# Patient Record
Sex: MALE | Race: AMERICAN INDIAN OR ALASKA NATIVE
[De-identification: names, ages, dates, MRNs, and addresses within clinical notes are randomized per-mention and may not be internally consistent; named-entity substitution may affect disease eponyms.]

---

## 2018-10-04 ENCOUNTER — HOSPITAL ENCOUNTER (EMERGENCY)
Dept: HOSPITAL 5 - ED | Age: 48
LOS: 1 days | Discharge: HOME | End: 2018-10-05
Payer: MEDICAID

## 2018-10-04 VITALS — DIASTOLIC BLOOD PRESSURE: 48 MMHG | SYSTOLIC BLOOD PRESSURE: 127 MMHG

## 2018-10-04 DIAGNOSIS — K04.7: Primary | ICD-10-CM

## 2018-10-04 DIAGNOSIS — J44.9: ICD-10-CM

## 2018-10-04 DIAGNOSIS — F17.200: ICD-10-CM

## 2018-10-04 PROCEDURE — 99282 EMERGENCY DEPT VISIT SF MDM: CPT

## 2018-10-05 NOTE — EMERGENCY DEPARTMENT REPORT
HPI





- General


Chief Complaint: Dental/Oral


Time Seen by Provider: 10/05/18 00:15





- HPI


HPI: 





Plan of dental pain, for 1 month, lower tooth, front, minimal redness and 

swelling around tooth.  Gum swelling.  No fever, chills or night sweat.





ED Past Medical Hx





- Past Medical History


Previous Medical History?: Yes


Hx Asthma: Yes


Hx COPD: Yes





- Surgical History


Past Surgical History?: Yes


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 

surgery





- Social History


Smoking Status: Current Every Day Smoker


Substance Use Type: None





- Medications


Home Medications: 


 Home Medications











 Medication  Instructions  Recorded  Confirmed  Last Taken  Type


 


Acetaminophen/Codeine [Tylenol 1 tab PO Q6H PRN #12 tab 09/12/17  Unknown Rx





/Codeine # 3 tab]     


 


Hydrocortisone 1% [Hydrocortisone 1 applicatio TP TID PRN #1 tube 09/12/17  

Unknown Rx





1% CREAM]     


 


Skin Emollient [Aquaphor (Nf)] 1 applic TP BID #1 jar 09/12/17  Unknown Rx


 


Clindamycin [Clindamycin CAP] 300 mg PO Q8H 7 Days #21 cap 06/26/18  Unknown Rx


 


Naproxen [Naprosyn TAB] 500 mg PO BID PRN #15 tablet 06/26/18  Unknown Rx


 


traMADol [Ultram 50 MG tab] 50 mg PO Q6HR PRN #15 tablet 06/26/18  Unknown Rx


 


Clindamycin HCl 150 mg PO TID 7 Days #21 capsule 10/05/18  Unknown Rx


 


Naproxen [Naprosyn TAB] 500 mg PO BID PRN #20 tablet 10/05/18  Unknown Rx














ED Review of Systems


ROS: 


Stated complaint: TOOTHACHE


Other details as noted in HPI





Comment: All other systems reviewed and negative


ENT: dental pain


Respiratory: denies: cough, orthopnea


Cardiovascular: denies: chest pain, palpitations


Endocrine: denies: excessive sweating, flushing, intolerance to cold





Physical Exam





- Physical Exam


Vital Signs: 


 Vital Signs











  10/04/18





  23:28


 


Temperature 98.7 F


 


Pulse Rate 74


 


Respiratory 16





Rate 


 


Blood Pressure 127/48


 


O2 Sat by Pulse 98





Oximetry 











Physical Exam: 





- Physical Exam


Physical Exam: 





- General


Limitations: No Limitations


General appearance: alert, in no apparent distress.





- Head


Head exam: Present: atraumatic, normocephalic





- Eye


Eye exam: Present: normal appearance





- ENT


ENT exam: Present: mucous membranes moist, anterior,lower jaw tooth with gum 

swelling, small abscess





- Neck


Neck exam: Present: normal inspection





- Respiratory


Respiratory exam: Present: normal lung sounds bilaterally.  Absent: respiratory 

distress





- Cardiovascular


Cardiovascular Exam: Present: normal rhythm.  Absent: systolic murmur, 

diastolic murmur, rubs, gallop





- GI/Abdominal


GI/Abdominal exam: Present: soft, normal bowel sounds





- Extremities Exam


Extremities exam: Present: normal inspection





- Back Exam


Back exam: Present: normal inspection





- Neurological Exam


Neurological exam: Present: alert, oriented X3





- Psychiatric


Psychiatric exam: normal affect and mood





- Skin


Skin exam: Present: warm, dry, intact, normal color.  Absent: rash





ED Course


 Vital Signs











  10/04/18





  23:28


 


Temperature 98.7 F


 


Pulse Rate 74


 


Respiratory 16





Rate 


 


Blood Pressure 127/48


 


O2 Sat by Pulse 98





Oximetry 











Critical care attestation.: 


If time is entered above; I have spent that time in minutes in the direct care 

of this critically ill patient, excluding procedure time.








ED Disposition


Clinical Impression: 


 Dental abscess





Disposition: DC-01 TO HOME OR SELFCARE


Is pt being admited?: No


Does the pt Need Aspirin: No


Condition: Stable


Prescriptions: 


Clindamycin HCl 150 mg PO TID 7 Days #21 capsule


Naproxen [Naprosyn TAB] 500 mg PO BID PRN #20 tablet


 PRN Reason: Pain

## 2019-11-24 ENCOUNTER — HOSPITAL ENCOUNTER (INPATIENT)
Dept: HOSPITAL 5 - ED | Age: 49
LOS: 10 days | Discharge: HOME | DRG: 917 | End: 2019-12-04
Attending: HOSPITALIST | Admitting: INTERNAL MEDICINE
Payer: MEDICAID

## 2019-11-24 DIAGNOSIS — R74.0: ICD-10-CM

## 2019-11-24 DIAGNOSIS — J43.8: ICD-10-CM

## 2019-11-24 DIAGNOSIS — Z90.81: ICD-10-CM
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
## 2019-11-24 DIAGNOSIS — E43: ICD-10-CM

## 2019-11-24 DIAGNOSIS — I95.9: ICD-10-CM

## 2019-11-24 DIAGNOSIS — J96.02: ICD-10-CM

## 2019-11-24 DIAGNOSIS — T40.5X2A: Primary | ICD-10-CM

## 2019-11-24 DIAGNOSIS — D72.829: ICD-10-CM

## 2019-11-24 DIAGNOSIS — F17.213: ICD-10-CM
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
## 2019-11-24 DIAGNOSIS — F14.20: ICD-10-CM

## 2019-11-24 DIAGNOSIS — R45.851: ICD-10-CM

## 2019-11-24 DIAGNOSIS — F10.20: ICD-10-CM
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
## 2019-11-24 DIAGNOSIS — E83.39: ICD-10-CM

## 2019-11-24 DIAGNOSIS — F19.10: ICD-10-CM

## 2019-11-24 DIAGNOSIS — S14.109A: ICD-10-CM

## 2019-11-24 DIAGNOSIS — J96.01: ICD-10-CM

## 2019-11-24 DIAGNOSIS — F32.9: ICD-10-CM

## 2019-11-24 DIAGNOSIS — G93.41: ICD-10-CM
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
## 2019-11-24 LAB
ALBUMIN SERPL-MCNC: 3.1 G/DL (ref 3.9–5)
ALBUMIN SERPL-MCNC: 3.2 G/DL (ref 3.9–5)
ALT SERPL-CCNC: 47 UNITS/L (ref 7–56)
APTT BLD: 29.7 SEC. (ref 24.2–36.6)
BASOPHILS # (AUTO): 0.2 K/MM3 (ref 0–0.1)
BASOPHILS NFR BLD AUTO: 1.9 % (ref 0–1.8)
BENZODIAZEPINES SCREEN,URINE: (no result)
BILIRUB DIRECT SERPL-MCNC: < 0.2 MG/DL (ref 0–0.2)
BILIRUB UR QL STRIP: (no result)
BLOOD UR QL VISUAL: (no result)
BUN SERPL-MCNC: 16 MG/DL (ref 9–20)
BUN/CREAT SERPL: 18 %
CALCIUM SERPL-MCNC: 8.8 MG/DL (ref 8.4–10.2)
CAOX CRY #/AREA URNS HPF: (no result) /[HPF]
EOSINOPHIL # BLD AUTO: 0.1 K/MM3 (ref 0–0.4)
EOSINOPHIL NFR BLD AUTO: 0.8 % (ref 0–4.3)
HCT VFR BLD CALC: 38.5 % (ref 35.5–45.6)
HEMOLYSIS INDEX: 2
HGB BLD-MCNC: 13.1 GM/DL (ref 11.8–15.2)
INR PPP: 3 (ref 0.87–1.13)
LYMPHOCYTES # BLD AUTO: 2.8 K/MM3 (ref 1.2–5.4)
LYMPHOCYTES NFR BLD AUTO: 28 % (ref 13.4–35)
MCHC RBC AUTO-ENTMCNC: 34 % (ref 32–34)
MCV RBC AUTO: 99 FL (ref 84–94)
METHADONE SCREEN,URINE: (no result)
MONOCYTES # (AUTO): 1.2 K/MM3 (ref 0–0.8)
MONOCYTES % (AUTO): 12.1 % (ref 0–7.3)
MUCOUS THREADS #/AREA URNS HPF: (no result) /HPF
OPIATE SCREEN,URINE: (no result)
PH UR STRIP: 6 [PH] (ref 5–7)
PLATELET # BLD: 353 K/MM3 (ref 140–440)
PROT UR STRIP-MCNC: (no result) MG/DL
RBC # BLD AUTO: 3.89 M/MM3 (ref 3.65–5.03)
RBC #/AREA URNS HPF: 25 /HPF (ref 0–6)
UROBILINOGEN UR-MCNC: 4 MG/DL (ref ?–2)
WBC #/AREA URNS HPF: 3 /HPF (ref 0–6)

## 2019-11-24 PROCEDURE — 86901 BLOOD TYPING SEROLOGIC RH(D): CPT

## 2019-11-24 PROCEDURE — 82550 ASSAY OF CK (CPK): CPT

## 2019-11-24 PROCEDURE — 83615 LACTATE (LD) (LDH) ENZYME: CPT

## 2019-11-24 PROCEDURE — 85018 HEMOGLOBIN: CPT

## 2019-11-24 PROCEDURE — 85049 AUTOMATED PLATELET COUNT: CPT

## 2019-11-24 PROCEDURE — 85610 PROTHROMBIN TIME: CPT

## 2019-11-24 PROCEDURE — 84484 ASSAY OF TROPONIN QUANT: CPT

## 2019-11-24 PROCEDURE — 83036 HEMOGLOBIN GLYCOSYLATED A1C: CPT

## 2019-11-24 PROCEDURE — 93970 EXTREMITY STUDY: CPT

## 2019-11-24 PROCEDURE — 85027 COMPLETE CBC AUTOMATED: CPT

## 2019-11-24 PROCEDURE — 99292 CRITICAL CARE ADDL 30 MIN: CPT

## 2019-11-24 PROCEDURE — 82150 ASSAY OF AMYLASE: CPT

## 2019-11-24 PROCEDURE — 74018 RADEX ABDOMEN 1 VIEW: CPT

## 2019-11-24 PROCEDURE — 80307 DRUG TEST PRSMV CHEM ANLYZR: CPT

## 2019-11-24 PROCEDURE — 85025 COMPLETE CBC W/AUTO DIFF WBC: CPT

## 2019-11-24 PROCEDURE — 70450 CT HEAD/BRAIN W/O DYE: CPT

## 2019-11-24 PROCEDURE — 0BH18EZ INSERTION OF ENDOTRACHEAL AIRWAY INTO TRACHEA, VIA NATURAL OR ARTIFICIAL OPENING ENDOSCOPIC: ICD-10-PCS | Performed by: INTERNAL MEDICINE

## 2019-11-24 PROCEDURE — 83690 ASSAY OF LIPASE: CPT

## 2019-11-24 PROCEDURE — 74176 CT ABD & PELVIS W/O CONTRAST: CPT

## 2019-11-24 PROCEDURE — 80053 COMPREHEN METABOLIC PANEL: CPT

## 2019-11-24 PROCEDURE — 71045 X-RAY EXAM CHEST 1 VIEW: CPT

## 2019-11-24 PROCEDURE — 36415 COLL VENOUS BLD VENIPUNCTURE: CPT

## 2019-11-24 PROCEDURE — 94760 N-INVAS EAR/PLS OXIMETRY 1: CPT

## 2019-11-24 PROCEDURE — 80048 BASIC METABOLIC PNL TOTAL CA: CPT

## 2019-11-24 PROCEDURE — 86900 BLOOD TYPING SEROLOGIC ABO: CPT

## 2019-11-24 PROCEDURE — 80076 HEPATIC FUNCTION PANEL: CPT

## 2019-11-24 PROCEDURE — 94002 VENT MGMT INPAT INIT DAY: CPT

## 2019-11-24 PROCEDURE — 82040 ASSAY OF SERUM ALBUMIN: CPT

## 2019-11-24 PROCEDURE — 83735 ASSAY OF MAGNESIUM: CPT

## 2019-11-24 PROCEDURE — 94003 VENT MGMT INPAT SUBQ DAY: CPT

## 2019-11-24 PROCEDURE — 93005 ELECTROCARDIOGRAM TRACING: CPT

## 2019-11-24 PROCEDURE — 85730 THROMBOPLASTIN TIME PARTIAL: CPT

## 2019-11-24 PROCEDURE — 93306 TTE W/DOPPLER COMPLETE: CPT

## 2019-11-24 PROCEDURE — 85014 HEMATOCRIT: CPT

## 2019-11-24 PROCEDURE — 81001 URINALYSIS AUTO W/SCOPE: CPT

## 2019-11-24 PROCEDURE — 93010 ELECTROCARDIOGRAM REPORT: CPT

## 2019-11-24 PROCEDURE — 83880 ASSAY OF NATRIURETIC PEPTIDE: CPT

## 2019-11-24 PROCEDURE — 36600 WITHDRAWAL OF ARTERIAL BLOOD: CPT

## 2019-11-24 PROCEDURE — 86850 RBC ANTIBODY SCREEN: CPT

## 2019-11-24 PROCEDURE — 82140 ASSAY OF AMMONIA: CPT

## 2019-11-24 PROCEDURE — 87070 CULTURE OTHR SPECIMN AEROBIC: CPT

## 2019-11-24 PROCEDURE — 82962 GLUCOSE BLOOD TEST: CPT

## 2019-11-24 PROCEDURE — 82553 CREATINE MB FRACTION: CPT

## 2019-11-24 PROCEDURE — 72125 CT NECK SPINE W/O DYE: CPT

## 2019-11-24 PROCEDURE — 80320 DRUG SCREEN QUANTALCOHOLS: CPT

## 2019-11-24 PROCEDURE — G0480 DRUG TEST DEF 1-7 CLASSES: HCPCS

## 2019-11-24 PROCEDURE — 94640 AIRWAY INHALATION TREATMENT: CPT

## 2019-11-24 PROCEDURE — 84100 ASSAY OF PHOSPHORUS: CPT

## 2019-11-24 PROCEDURE — 71275 CT ANGIOGRAPHY CHEST: CPT

## 2019-11-24 PROCEDURE — 4A033R1 MEASUREMENT OF ARTERIAL SATURATION, PERIPHERAL, PERCUTANEOUS APPROACH: ICD-10-PCS | Performed by: INTERNAL MEDICINE

## 2019-11-24 PROCEDURE — 5A1945Z RESPIRATORY VENTILATION, 24-96 CONSECUTIVE HOURS: ICD-10-PCS | Performed by: INTERNAL MEDICINE

## 2019-11-24 PROCEDURE — 82803 BLOOD GASES ANY COMBINATION: CPT

## 2019-11-24 PROCEDURE — 85007 BL SMEAR W/DIFF WBC COUNT: CPT

## 2019-11-24 PROCEDURE — 87205 SMEAR GRAM STAIN: CPT

## 2019-11-24 PROCEDURE — 85520 HEPARIN ASSAY: CPT

## 2019-11-24 RX ADMIN — PROPOFOL SCH MLS/HR: 10 INJECTION, EMULSION INTRAVENOUS at 22:50

## 2019-11-24 RX ADMIN — IPRATROPIUM BROMIDE AND ALBUTEROL SULFATE SCH AMPUL: .5; 3 SOLUTION RESPIRATORY (INHALATION) at 19:51

## 2019-11-24 RX ADMIN — FAMOTIDINE SCH MG: 10 INJECTION, SOLUTION INTRAVENOUS at 21:04

## 2019-11-24 RX ADMIN — Medication SCH ML: at 21:05

## 2019-11-24 RX ADMIN — METHYLPREDNISOLONE SODIUM SUCCINATE SCH MG: 125 INJECTION, POWDER, FOR SOLUTION INTRAMUSCULAR; INTRAVENOUS at 21:04

## 2019-11-24 RX ADMIN — PROPOFOL SCH MLS/HR: 10 INJECTION, EMULSION INTRAVENOUS at 10:49

## 2019-11-24 RX ADMIN — DEXTROSE AND SODIUM CHLORIDE SCH MLS/HR: 5; .9 INJECTION, SOLUTION INTRAVENOUS at 20:22

## 2019-11-24 NOTE — CAT SCAN REPORT
CT ABDOMEN AND PELVIS WITHOUT CONTRAST



INDICATION: Abdominal pain after fall.



COMPARISON: CT abdomen and pelvis from 8/31/2018.



TECHNIQUE: Axial, coronal and sagittal CT imaging of the abdomen and pelvis was performed  without co
ntrast.  Lack of intravenous contrast limits evaluation of the vascular and solid organs.  All CT sca
ns at this location are performed using CT dose reduction for ALARA by means of automated exposure co
ntrol.

 

FINDINGS: 

LOWER CHEST: Bilateral bullous disease is partially visualized. There is dependent bilateral atelecta
sis. No additional significant abnormality is seen.

LIVER: No significant abnormality.

BILIARY: No significant abnormality.

PANCREAS: No significant abnormality.

SPLEEN: The spleen is not clearly seen.

ADRENALS: No significant abnormality.

KIDNEYS AND URETERS: There is a probable right upper renal pole cyst measuring 1.5 cm. No other signi
ficant abnormality is seen.



GI TRACT: No significant abnormality of the stomach, small bowel or colon.  The appendix is not seen.


PERITONEUM: No free fluid. No free air. No fluid collection.

LYMPH NODES: No significant adenopathy.

VASCULATURE: No significant abnormality. 



URINARY BLADDER: Drained by a Aguirre catheter.

REPRODUCTIVE ORGANS: No significant abnormality.



ADDITIONAL FINDINGS: None.



SKELETAL SYSTEM: No significant abnormality.



IMPRESSION: 

1. Limited noncontrast exam demonstrating no acute abnormality of the abdomen or pelvis.

2. Additional findings as above.



Signer Name: Naseem Chahal MD 

Signed: 11/24/2019 11:05 AM

 Workstation Name: [id number]

## 2019-11-24 NOTE — EVENT NOTE
Date: 11/24/19


See history and physical in the reports


Acute respiratory failure


Drug overdose----Cocaine use


Suicidal attempt

## 2019-11-24 NOTE — CAT SCAN REPORT
CT head/brain wo con



INDICATION:

Trauma.



TECHNIQUE: Routine CT head without contrast. All CT scans at this location are performed using CT dos
e reduction for ALARA by means of automated exposure control.



COMPARISON: 

CT head without contrast from 1/2/2019.



FINDINGS:



PARENCHYMA:  No mass, midline shift, hemorrhage, extraaxial collection or acute territorial infarctio
n. Mobile chronic microvessel or skin changes are noted along the periventricular white matter.

VENTRICLES:  Symmetric and normal in size.  

SOFT TISSUES:  Soft tissues including the orbits appear normal.   

BONES:  No acute osseous abnormality.   

SINUSES: There is generalized sinus disease, most significant along the maxillary sinuses, with mucos
al thickening and air-fluid levels. The mastoid air cells are clear.

ADDITIONAL FINDINGS: None. 



IMPRESSION:

1. No acute intracranial abnormality.

2. Additional findings as above. 



Signer Name: Naseem Chahal MD 

Signed: 11/24/2019 11:09 AM

 Workstation Name: [id number]

## 2019-11-24 NOTE — XRAY REPORT
CHEST 1 VIEW 11/24/2019 10:55 AM



INDICATION / CLINICAL INFORMATION:

ETT placement.



COMPARISON: 

One view of the chest from 1/3/2019.



FINDINGS:



SUPPORT DEVICES: An ET tube terminates 10 cm above the andreina.

HEART / MEDIASTINUM: No significant abnormality. 

LUNGS / PLEURA: Bullous disease is noted along the upper lobes with generalized bilateral chronic sarah
earing parenchymal changes. No acute pulmonary abnormality is seen. No significant pleural effusion o
r pneumothorax is identified. 



ADDITIONAL FINDINGS: No significant additional findings.



IMPRESSION:

1. No acute abnormality of the chest.

2. Additional findings as above.



Signer Name: Naseem Chahal MD 

Signed: 11/24/2019 11:20 AM

 Workstation Name: [id number]

## 2019-11-24 NOTE — XRAY REPORT
BILATERAL TIBIA/FIBULA 4 VIEWS



INDICATION / CLINICAL INFORMATION:

Bilateral leg pain after falling off a bridge.



COMPARISON:

None available.

 

FINDINGS:



BONES and JOINT(S): No acute fracture or subluxation. No significant arthritis.

SOFT TISSUES: No significant abnormality.



ADDITIONAL FINDINGS: None.



IMPRESSION:

No acute abnormality of either tibia/fibula.



Signer Name: Naseem Chahal MD 

Signed: 11/24/2019 11:22 AM

 Workstation Name: [id number]

## 2019-11-24 NOTE — CAT SCAN REPORT
CT CERVICAL SPINE WITHOUT CONTRAST



INDICATION / CLINICAL INFORMATION:

Trauma. Patient fell sustaining neck injury.



TECHNIQUE:

Axial CT images were obtained through the cervical spine. Sagittal and coronal reformatted images wer
e produced. All CT scans at this location are performed using CT dose reduction for ALARA by means of
 automated exposure control. 



COMPARISON:

None available.



FINDINGS:



ALIGNMENT: No significant abnormality. No evidence of traumatic subluxation.



VERTEBRAE: No indication of fracture.



DISC SPACES: Disc height is maintained throughout



INDIVIDUAL LEVEL ANALYSIS:



C2-3:No abnormality.



C3-4:No abnormality.



C4-5:No abnormality.



C5-6:No abnormality.



C6-7:No abnormality.



C7-T1:No abnormality.





CRANIOCERVICAL JUNCTION:No significant abnormality.



SPINAL CANAL: Central spinal canal and neuroforamina are adequately maintained throughout the cervica
l region.



PARASPINAL SOFT TISSUES: Increased soft tissue fullness is seen in the posterior nasopharynx, posteri
or oral cavity and posterior nasal cavity. Evaluation of these regions is somewhat limited on this no
ncontrast scan. This appears to be due to retained secretions secondary to intubation, however, follo
w up CT neck is suggested to exclude other retropharyngeal pathology such as mass or hematoma.



ADDITIONAL FINDINGS: There is a foreign body along the lateral aspect of the right neck. This may be 
related to Angiocath placement. Correlation with physical examination is advised. Endotracheal tube i
s present. The inflated cuff of the endotracheal tube is located just below the level of the cricoid 
cartilage. The tube could be advanced approximately 4 to 5 cm for positioning just above the level of
 the andreina.



LUNG APICES: Extensive bullous changes are present at both lung apices. Consider possible alpha-1 ant
itrypsinase deficiency. There is no indication of pneumothorax.



IMPRESSION:

1. No indication of fracture or traumatic subluxation.

2. Increased soft tissue fullness in the prevertebral and retropharyngeal region may represent retain
ed secretions secondary to nasotracheal intubation, however, follow-up study is suggested to exclude 
other retropharyngeal or prevertebral pathology such as mass or hematoma.

3. Extensive biapical bullous changes in both lung apices.





Signer Name: Markie Tang MD 

Signed: 11/24/2019 11:30 AM

 Workstation Name: [email or web address]

## 2019-11-24 NOTE — EMERGENCY DEPARTMENT REPORT
ED General Adult HPI





- General


Chief complaint: Overdose


Stated complaint: SI


Time Seen by Provider: 11/24/19 09:41


Source: police, EMS


Mode of arrival: Stretcher


Limitations: Altered Mental Status





- History of Present Illness


Initial comments: 


This is a 48-year-old male who came to the attention of paramedics when police 

found him sitting on a bridge and threatening to jump off of it.  He admitted to

crack cocaine abuse to the officer.  Instead of jumping off the bridge he 

apparently fell off a barrier which looked to be approximately 4 feet in height 

on the officer's cell phone.  He did inform the officer that he wanted to kill 

himself.  He also stated the same on arrival.  He admitted to "overdose" but was

not any more specific than admitting the cocaine abuse.  He was noted to be 

breathing very irregularly and slow by the paramedics.  He was altered.  

Therefore they gave him Narcan.  They stated that there was some benefit.  He 

arrived with somewhat agonal respirations but not bag-valve-mask assist.





Immediately upon his arrival we initiated more active assisted ventilation.  I 

began a right EJ in his neck.  He was given additional Narcan.  He became very 

agitated.  He was placed in cervical immobilization.  He did eat at one time he 

had neck discomfort.  However, we were largely unable to obtain any history due 

to his altered mental status.  





Severity scale (0 -10): 0





- Related Data


                                Home Medications











 Medication  Instructions  Recorded  Confirmed  Last Taken


 


Unobtainable  11/24/19 11/24/19 Unknown











                                    Allergies











Allergy/AdvReac Type Severity Reaction Status Date / Time


 


No Known Allergies Allergy   Verified 11/24/19 10:08














ED Review of Systems


ROS: 


Stated complaint: SI


Other details as noted in HPI





Comment: Unobtainable due to pts medical conditions





ED Past Medical Hx





- Past Medical History


Previous Medical History?: Yes


Hx Asthma: Yes


Hx COPD: Yes





- Surgical History


Past Surgical History?: Yes


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 

surgery





- Social History


Smoking Status: Never Smoker


Substance Use Type: None





- Medications


Home Medications: 


                                Home Medications











 Medication  Instructions  Recorded  Confirmed  Last Taken  Type


 


Unobtainable  11/24/19 11/24/19 Unknown History














ED Physical Exam





- General


Limitations: Altered Mental Status


General appearance: other (agitated)





- Head


Head exam: Present: atraumatic (no obvious injury)





- Eye


Eye exam: Present: normal appearance.  Absent: scleral icterus





- ENT


ENT exam: Present: normal exam, other (difficulty with secretions)





- Neck


Neck exam: Present: normal inspection.  Absent: tenderness





- Respiratory


Respiratory exam: Present: decreased breath sounds (gasping respirations and 

slow rate).  Absent: respiratory distress





- Cardiovascular


Cardiovascular Exam: Present: regular rate, normal rhythm.  Absent: systolic 

murmur, diastolic murmur, rubs, gallop





- GI/Abdominal


GI/Abdominal exam: Present: soft, normal bowel sounds.  Absent: distended, 

tenderness, guarding, rebound, rigid





- Extremities Exam


Extremities exam: Present: other (soft tissue swelling noted above both ankles b

ut no gross deformity)





- Back Exam


Back exam: Present: normal inspection.  Absent: paraspinal tenderness (as 

testable), vertebral tenderness





- Neurological Exam


Neurological exam: Present: altered, CN II-XII intact.  Absent: motor sensory 

deficit





- Psychiatric


Psychiatric exam: Present: agitated





- Skin


Skin exam: Present: warm, dry, intact, normal color.  Absent: rash





ED Course


                                   Vital Signs











  11/24/19 11/24/19 11/24/19





  09:58 11:13 11:57


 


Temperature 98 F  


 


Pulse Rate 110 H 117 H 80


 


Respiratory 16  16





Rate   


 


Blood Pressure 98/49 126/84 


 


Blood Pressure   103/55





[Right]   


 


O2 Sat by Pulse 96 99 96





Oximetry   














- Reevaluation(s)


Reevaluation #1: 


The patient required rapid sequence intubation to facilitate medical screening. 

 He could not cooperate with CT examination which was required.  This was done 

without difficulty.  He required ultimately to sedated drips as he continued to 

be agitated.


11/24/19 12:30





11/24/19 12:31


Essentially his trauma workup did not reveal anything substantial.  He is 

admitted to the medical service for further care and evaluation by Dr. Perdomo.





- EJ/Peripheral Line


  ** Neck R


Time Out Performed: Yes


Indications: nurses unable to establis


Skin Cleansed in Sterile Fashion: Yes


Size: 20


Dressing Placed: Tegaderm


Patient Tolerated Procedure: well





- Intubation


Time Out Performed: No


Sedative: Etomidate


Paralytic: Succinylcholine


Laryngoscope: Maria C


Size: 4


ET Tube Size: 7.5


Tube Secured Depth (cm): 23


Tube Secured Location: teeth


Tube Placement Confirmation: visualized tube passing t


Patient Tolerated Procedure: well


Intubation Complications: none





ED Medical Decision Making





- Lab Data


Result diagrams: 


                                 11/24/19 11:10





                                 11/24/19 11:10





                         Laboratory Results - last 24 hr











  11/24/19 11/24/19 11/24/19





  09:57 11:10 11:10


 


WBC   9.8 


 


RBC   3.89 


 


Hgb   13.1 


 


Hct   38.5 


 


MCV   99 H 


 


MCH   34 H 


 


MCHC   34 


 


RDW   13.1 L 


 


Plt Count   353 


 


Lymph % (Auto)   28.0 


 


Mono % (Auto)   12.1 H 


 


Eos % (Auto)   0.8 


 


Baso % (Auto)   1.9 H 


 


Lymph #   2.8 


 


Mono #   1.2 H 


 


Eos #   0.1 


 


Baso #   0.2 H 


 


Seg Neutrophils %   57.2 


 


Seg Neutrophils #   5.6 


 


PT    30.5 H


 


INR    3.00 H


 


APTT    29.7


 


POC ABG pH   


 


POC ABG pCO2   


 


POC ABG pO2   


 


POC ABG HCO3   


 


POC ABG Total CO2   


 


POC ABG O2 Sat   


 


POC ABG Base Excess   


 


FiO2   


 


Salicylates   


 


Urine Opiates Screen  Presumptive negative  


 


Urine Methadone Screen  Presumptive negative  


 


Acetaminophen   


 


Ur Barbiturates Screen  Presumptive negative  


 


Ur Phencyclidine Scrn  Presumptive negative  


 


Ur Amphetamines Screen  Presumptive negative  


 


U Benzodiazepines Scrn  Presumptive positive  


 


Urine Cocaine Screen  Presumptive positive  


 


U Marijuana (THC) Screen  Presumptive negative  


 


Drugs of Abuse Note  Disclamer  


 


Plasma/Serum Alcohol   














  11/24/19 11/24/19 11/24/19





  11:10 11:10 11:10


 


WBC   


 


RBC   


 


Hgb   


 


Hct   


 


MCV   


 


MCH   


 


MCHC   


 


RDW   


 


Plt Count   


 


Lymph % (Auto)   


 


Mono % (Auto)   


 


Eos % (Auto)   


 


Baso % (Auto)   


 


Lymph #   


 


Mono #   


 


Eos #   


 


Baso #   


 


Seg Neutrophils %   


 


Seg Neutrophils #   


 


PT   


 


INR   


 


APTT   


 


POC ABG pH   


 


POC ABG pCO2   


 


POC ABG pO2   


 


POC ABG HCO3   


 


POC ABG Total CO2   


 


POC ABG O2 Sat   


 


POC ABG Base Excess   


 


FiO2   


 


Salicylates  < 0.3 L  


 


Urine Opiates Screen   


 


Urine Methadone Screen   


 


Acetaminophen   < 5.0 L 


 


Ur Barbiturates Screen   


 


Ur Phencyclidine Scrn   


 


Ur Amphetamines Screen   


 


U Benzodiazepines Scrn   


 


Urine Cocaine Screen   


 


U Marijuana (THC) Screen   


 


Drugs of Abuse Note   


 


Plasma/Serum Alcohol    < 0.01














  11/24/19





  11:33


 


WBC 


 


RBC 


 


Hgb 


 


Hct 


 


MCV 


 


MCH 


 


MCHC 


 


RDW 


 


Plt Count 


 


Lymph % (Auto) 


 


Mono % (Auto) 


 


Eos % (Auto) 


 


Baso % (Auto) 


 


Lymph # 


 


Mono # 


 


Eos # 


 


Baso # 


 


Seg Neutrophils % 


 


Seg Neutrophils # 


 


PT 


 


INR 


 


APTT 


 


POC ABG pH  7.291 L


 


POC ABG pCO2  49.7 H


 


POC ABG pO2  234 H


 


POC ABG HCO3  24.0


 


POC ABG Total CO2  25


 


POC ABG O2 Sat  100


 


POC ABG Base Excess  -3


 


FiO2  70


 


Salicylates 


 


Urine Opiates Screen 


 


Urine Methadone Screen 


 


Acetaminophen 


 


Ur Barbiturates Screen 


 


Ur Phencyclidine Scrn 


 


Ur Amphetamines Screen 


 


U Benzodiazepines Scrn 


 


Urine Cocaine Screen 


 


U Marijuana (THC) Screen 


 


Drugs of Abuse Note 


 


Plasma/Serum Alcohol 














                         Laboratory Results - last 24 hr











  11/24/19 11/24/19 11/24/19





  09:57 11:10 11:10


 


WBC   9.8 


 


RBC   3.89 


 


Hgb   13.1 


 


Hct   38.5 


 


MCV   99 H 


 


MCH   34 H 


 


MCHC   34 


 


RDW   13.1 L 


 


Plt Count   353 


 


Lymph % (Auto)   28.0 


 


Mono % (Auto)   12.1 H 


 


Eos % (Auto)   0.8 


 


Baso % (Auto)   1.9 H 


 


Lymph #   2.8 


 


Mono #   1.2 H 


 


Eos #   0.1 


 


Baso #   0.2 H 


 


Seg Neutrophils %   57.2 


 


Seg Neutrophils #   5.6 


 


PT    30.5 H


 


INR    3.00 H


 


APTT    29.7


 


POC ABG pH   


 


POC ABG pCO2   


 


POC ABG pO2   


 


POC ABG HCO3   


 


POC ABG Total CO2   


 


POC ABG O2 Sat   


 


POC ABG Base Excess   


 


FiO2   


 


Sodium   


 


Potassium   


 


Chloride   


 


Carbon Dioxide   


 


Anion Gap   


 


BUN   


 


Creatinine   


 


Estimated GFR   


 


BUN/Creatinine Ratio   


 


Glucose   


 


Calcium   


 


Magnesium   


 


Total Bilirubin   


 


AST   


 


ALT   


 


Alkaline Phosphatase   


 


Total Creatine Kinase   


 


CK-MB (CK-2)   


 


CK-MB (CK-2) Rel Index   


 


Troponin T   


 


NT-Pro-B Natriuret Pep   


 


Total Protein   


 


Albumin   


 


Albumin/Globulin Ratio   


 


Lipase   


 


Salicylates   


 


Urine Opiates Screen  Presumptive negative  


 


Urine Methadone Screen  Presumptive negative  


 


Acetaminophen   


 


Ur Barbiturates Screen  Presumptive negative  


 


Ur Phencyclidine Scrn  Presumptive negative  


 


Ur Amphetamines Screen  Presumptive negative  


 


U Benzodiazepines Scrn  Presumptive positive  


 


Urine Cocaine Screen  Presumptive positive  


 


U Marijuana (THC) Screen  Presumptive negative  


 


Drugs of Abuse Note  Disclamer  


 


Plasma/Serum Alcohol   














  11/24/19 11/24/19 11/24/19





  11:10 11:10 11:10


 


WBC   


 


RBC   


 


Hgb   


 


Hct   


 


MCV   


 


MCH   


 


MCHC   


 


RDW   


 


Plt Count   


 


Lymph % (Auto)   


 


Mono % (Auto)   


 


Eos % (Auto)   


 


Baso % (Auto)   


 


Lymph #   


 


Mono #   


 


Eos #   


 


Baso #   


 


Seg Neutrophils %   


 


Seg Neutrophils #   


 


PT   


 


INR   


 


APTT   


 


POC ABG pH   


 


POC ABG pCO2   


 


POC ABG pO2   


 


POC ABG HCO3   


 


POC ABG Total CO2   


 


POC ABG O2 Sat   


 


POC ABG Base Excess   


 


FiO2   


 


Sodium  137  


 


Potassium  3.8  


 


Chloride  105.5  


 


Carbon Dioxide  18 L  


 


Anion Gap  17  


 


BUN  16  


 


Creatinine  0.9  


 


Estimated GFR  > 60  


 


BUN/Creatinine Ratio  18  


 


Glucose  103 H  


 


Calcium  8.8  


 


Magnesium  1.70  


 


Total Bilirubin  0.40  


 


AST  58 H  


 


ALT  47  


 


Alkaline Phosphatase  78  


 


Total Creatine Kinase  215 H  


 


CK-MB (CK-2)  4.5 H  


 


CK-MB (CK-2) Rel Index  2.0  


 


Troponin T  < 0.010  


 


NT-Pro-B Natriuret Pep  33.88  


 


Total Protein  7.0  


 


Albumin  3.1 L  


 


Albumin/Globulin Ratio  0.8  


 


Lipase  8 L  


 


Salicylates   < 0.3 L 


 


Urine Opiates Screen   


 


Urine Methadone Screen   


 


Acetaminophen    < 5.0 L


 


Ur Barbiturates Screen   


 


Ur Phencyclidine Scrn   


 


Ur Amphetamines Screen   


 


U Benzodiazepines Scrn   


 


Urine Cocaine Screen   


 


U Marijuana (THC) Screen   


 


Drugs of Abuse Note   


 


Plasma/Serum Alcohol   














  11/24/19 11/24/19





  11:10 11:33


 


WBC  


 


RBC  


 


Hgb  


 


Hct  


 


MCV  


 


MCH  


 


MCHC  


 


RDW  


 


Plt Count  


 


Lymph % (Auto)  


 


Mono % (Auto)  


 


Eos % (Auto)  


 


Baso % (Auto)  


 


Lymph #  


 


Mono #  


 


Eos #  


 


Baso #  


 


Seg Neutrophils %  


 


Seg Neutrophils #  


 


PT  


 


INR  


 


APTT  


 


POC ABG pH   7.291 L


 


POC ABG pCO2   49.7 H


 


POC ABG pO2   234 H


 


POC ABG HCO3   24.0


 


POC ABG Total CO2   25


 


POC ABG O2 Sat   100


 


POC ABG Base Excess   -3


 


FiO2   70


 


Sodium  


 


Potassium  


 


Chloride  


 


Carbon Dioxide  


 


Anion Gap  


 


BUN  


 


Creatinine  


 


Estimated GFR  


 


BUN/Creatinine Ratio  


 


Glucose  


 


Calcium  


 


Magnesium  


 


Total Bilirubin  


 


AST  


 


ALT  


 


Alkaline Phosphatase  


 


Total Creatine Kinase  


 


CK-MB (CK-2)  


 


CK-MB (CK-2) Rel Index  


 


Troponin T  


 


NT-Pro-B Natriuret Pep  


 


Total Protein  


 


Albumin  


 


Albumin/Globulin Ratio  


 


Lipase  


 


Salicylates  


 


Urine Opiates Screen  


 


Urine Methadone Screen  


 


Acetaminophen  


 


Ur Barbiturates Screen  


 


Ur Phencyclidine Scrn  


 


Ur Amphetamines Screen  


 


U Benzodiazepines Scrn  


 


Urine Cocaine Screen  


 


U Marijuana (THC) Screen  


 


Drugs of Abuse Note  


 


Plasma/Serum Alcohol  < 0.01 














- EKG Data


-: EKG Interpreted by Me


EKG shows normal: sinus rhythm, axis, intervals, QRS complexes, ST-T waves


Rate: normal





- EKG Data


Interpretation: nonspecific ST-T wave gavin





- Radiology Data


Radiology results: report reviewed (chest x-ray shows endotracheal tube in good 

position.  There are chronic changes.)


Critical Care Time: Yes


Critical care time in (mins) excluding proc time.: 90


Critical care attestation.: 


If time is entered above; I have spent that time in minutes in the direct care 

of this critically ill patient, excluding procedure time.








ED Disposition


Clinical Impression: 


 Cocaine abuse, Suicidal ideation





Altered mental status


Qualifiers:


 Altered mental status type: delirium Qualified Code(s): R41.0 - Disorientation,

 unspecified





Fall


Qualifiers:


 Encounter type: initial encounter Qualified Code(s): W19.XXXA - Unspecified 

fall, initial encounter





Respiratory failure


Qualifiers:


 Chronicity: acute Respiratory failure complication: unspecified whether with 

hypoxia or hypercapnia Qualified Code(s): J96.00 - Acute respiratory failure, 

unspecified whether with hypoxia or hypercapnia





Disposition: DC-09 OP ADMIT IP TO THIS HOSP


Is pt being admited?: Yes


Does the pt Need Aspirin: Yes


Condition: Stable


Referrals: 


PRIMARY CARE,MD [Primary Care Provider] - 3-5 Days


Time of Disposition: 12:39

## 2019-11-25 LAB
ALBUMIN SERPL-MCNC: 3.2 G/DL (ref 3.9–5)
ALT SERPL-CCNC: 56 UNITS/L (ref 7–56)
BASOPHILS # (AUTO): 0 K/MM3 (ref 0–0.1)
BASOPHILS NFR BLD AUTO: 0.6 % (ref 0–1.8)
BUN SERPL-MCNC: 10 MG/DL (ref 9–20)
BUN/CREAT SERPL: 11 %
CALCIUM SERPL-MCNC: 9.4 MG/DL (ref 8.4–10.2)
EOSINOPHIL # BLD AUTO: 0 K/MM3 (ref 0–0.4)
EOSINOPHIL NFR BLD AUTO: 0 % (ref 0–4.3)
HCT VFR BLD CALC: 43.3 % (ref 35.5–45.6)
HEMOLYSIS INDEX: 14
HGB BLD-MCNC: 14.5 GM/DL (ref 11.8–15.2)
LYMPHOCYTES # BLD AUTO: 0.4 K/MM3 (ref 1.2–5.4)
LYMPHOCYTES NFR BLD AUTO: 9.6 % (ref 13.4–35)
MCHC RBC AUTO-ENTMCNC: 33 % (ref 32–34)
MCV RBC AUTO: 100 FL (ref 84–94)
MONOCYTES # (AUTO): 0.1 K/MM3 (ref 0–0.8)
MONOCYTES % (AUTO): 3.4 % (ref 0–7.3)
PLATELET # BLD: 371 K/MM3 (ref 140–440)
RBC # BLD AUTO: 4.32 M/MM3 (ref 3.65–5.03)

## 2019-11-25 RX ADMIN — PROPOFOL SCH MLS/HR: 10 INJECTION, EMULSION INTRAVENOUS at 04:00

## 2019-11-25 RX ADMIN — PROPOFOL SCH MLS/HR: 10 INJECTION, EMULSION INTRAVENOUS at 21:50

## 2019-11-25 RX ADMIN — DEXTROSE AND SODIUM CHLORIDE SCH MLS/HR: 5; .9 INJECTION, SOLUTION INTRAVENOUS at 20:00

## 2019-11-25 RX ADMIN — DEXTROSE AND SODIUM CHLORIDE SCH MLS/HR: 5; .9 INJECTION, SOLUTION INTRAVENOUS at 09:32

## 2019-11-25 RX ADMIN — FAMOTIDINE SCH MG: 10 INJECTION, SOLUTION INTRAVENOUS at 21:05

## 2019-11-25 RX ADMIN — HEPARIN SODIUM SCH UNIT: 5000 INJECTION, SOLUTION INTRAVENOUS; SUBCUTANEOUS at 21:04

## 2019-11-25 RX ADMIN — HEPARIN SODIUM SCH UNIT: 5000 INJECTION, SOLUTION INTRAVENOUS; SUBCUTANEOUS at 09:08

## 2019-11-25 RX ADMIN — METHYLPREDNISOLONE SODIUM SUCCINATE SCH MG: 125 INJECTION, POWDER, FOR SOLUTION INTRAMUSCULAR; INTRAVENOUS at 13:39

## 2019-11-25 RX ADMIN — IPRATROPIUM BROMIDE AND ALBUTEROL SULFATE SCH AMPUL: .5; 3 SOLUTION RESPIRATORY (INHALATION) at 13:00

## 2019-11-25 RX ADMIN — IPRATROPIUM BROMIDE AND ALBUTEROL SULFATE SCH: .5; 3 SOLUTION RESPIRATORY (INHALATION) at 16:30

## 2019-11-25 RX ADMIN — IPRATROPIUM BROMIDE AND ALBUTEROL SULFATE SCH AMPUL: .5; 3 SOLUTION RESPIRATORY (INHALATION) at 08:24

## 2019-11-25 RX ADMIN — METHYLPREDNISOLONE SODIUM SUCCINATE SCH MG: 125 INJECTION, POWDER, FOR SOLUTION INTRAMUSCULAR; INTRAVENOUS at 06:22

## 2019-11-25 RX ADMIN — METHYLPREDNISOLONE SODIUM SUCCINATE SCH MG: 125 INJECTION, POWDER, FOR SOLUTION INTRAMUSCULAR; INTRAVENOUS at 21:04

## 2019-11-25 RX ADMIN — FAMOTIDINE SCH MG: 10 INJECTION, SOLUTION INTRAVENOUS at 09:08

## 2019-11-25 RX ADMIN — IPRATROPIUM BROMIDE AND ALBUTEROL SULFATE SCH AMPUL: .5; 3 SOLUTION RESPIRATORY (INHALATION) at 20:30

## 2019-11-25 RX ADMIN — Medication SCH ML: at 09:08

## 2019-11-25 RX ADMIN — Medication SCH ML: at 21:05

## 2019-11-25 NOTE — XRAY REPORT
CHEST 1 VIEW, 11/25/2019 2:11 AM 



CLINICAL INFORMATION/INDICATION: Respiratory failure



COMPARISON: Chest radiograph, 11/24/2019 at 10:55 AM



FINDINGS:



SUPPORT DEVICES: The endotracheal tube remains in stable position. An esophagogastric tube has been p
laced with distal tip and sidehole overlying the gastric fundus.

HEART: Cardiac silhouette is normal in size.

LUNGS/PLEURA: Chronic interstitial changes and diffuse bullous disease is again noted. There is no ellis
perimposed airspace disease or significant pleural effusion.

ADDITIONAL FINDINGS: No additional acute findings.



IMPRESSION:

1. Stable chronic interstitial changes.

2. Interval placement of esophagogastric tube.



Signer Name: Jemma Hurley MD 

Signed: 11/25/2019 3:18 AM

 Workstation Name: [id number]

## 2019-11-25 NOTE — PROGRESS NOTE
Assessment and Plan


Assessment and plan: 





48-year-old male cracked cocaine abuse emphysema presents with acute respiratory

failure after failed attempted suicide.  Patient will require psych consult and 

1013 after he wakes up and is more stable and responsive.





The high probability of a clinically significant, sudden or life threatening 

deterioration of the [] system(s) required my full and direct attention, 

intervention and personal management. The aggregate critical care time was [] 

minutes. This time is in addition to time spent performing reported procedures 

but includes the following: 





[x] Data Review and interpretation 





[x]x Patient assessment and monitoring of vital signs 





[x] Documentation 





[x] Medication orders and management


Total Time Spent with Patient (Minutes): 31





- Patient Problems


(1) COPD exacerbation


Current Visit: No   Status: Acute   


Plan to address problem: 


Patient has wheezing and emphysema.  Chest x-ray consistent with emphysema.  

Most likely exacerbated by suicide attempt and crack abuse.








(2) Respiratory failure


Current Visit: Yes   Status: Acute   


Qualifiers: 


   Chronicity: acute   Respiratory failure complication: unspecified whether 

with hypoxia or hypercapnia   Qualified Code(s): J96.00 - Acute respiratory 

failure, unspecified whether with hypoxia or hypercapnia   


Plan to address problem: 


Respiratory failure I reviewed all CT scans head chest abdomen pelvis chest x-

ray.  All workup nothing new.  The to help with underlying condition and workup.








(3) Cocaine abuse


Current Visit: Yes   Status: Acute   


Plan to address problem: 


Patient is no longer tachycardic or shows any evidence of crack overdose of 

cocaine overdose at this particular time.  Remains intubated however.








(4) Suicidal ideation


Current Visit: Yes   Status: Acute   


Plan to address problem: 


Patient with suicide attempt.  Will require 1013 is extubated.








(5) Malnutrition


Current Visit: Yes   Status: Acute   


Plan to address problem: 


Most likely secondary to drug abuse patient remains cachectic.  We'll encourage 

boost once patient is extubated.








History


Interval history: 





patient 48 year old with a history of emphysema, crack cocaine abuse and tobacco

 abuse presents after being [place] bridge with a failed attempt at suicide. 

 Patient was threatening to jump off a bridge he ended up falling down approx

imately 4 foot embankment.  Patient was brought to the ED was found to be in 

acute distress with agonal breathing.  Patient required intubation because of 

acute hypoxemia.  At this point this is all the history that was obtained.  A 

dyspneic with eldest sister at [phone] to obtain any further information 

and no further pertinent information was obtained this as he was out on  drugs 

"real bad she wanted him to go to a crisis center was stabilized.





Hospitalist Physical





- Constitutional


Vitals: 


                                        











Temp Pulse Resp BP Pulse Ox


 


 97.6 F   93 H  20   109/73   99 


 


 11/25/19 08:00  11/25/19 10:15  11/25/19 10:15  11/25/19 10:15  11/25/19 10:15











General appearance: Present: severe distress, cachectic, disheveled, other 

(intubated sedation.)





- EENT


Eyes: Present: PERRL





- Neck


Neck: Present: normal ROM





- Respiratory


Respiratory: bilateral: diminished, rales





- Cardiovascular


Rhythm: regular





- Extremities


Extremities: no ischemia, pulses intact, pulses symmetrical, No edema, normal 

temperature, normal color


Peripheral Pulses: within normal limits





- Abdominal


General gastrointestinal: soft, non-tender, non-distended, normal bowel sounds, 

no hepatomegaly, no splenomegaly





- Integumentary


Integumentary: Present: clear, warm





- Neurologic


Neurologic: other (intubated sedated.)





Results





- Labs


CBC & Chem 7: 


                                 11/25/19 04:32





                                 11/25/19 04:32


Labs: 


                             Laboratory Last Values











WBC  4.0 K/mm3 (4.5-11.0)  L  11/25/19  04:32    


 


RBC  4.32 M/mm3 (3.65-5.03)   11/25/19  04:32    


 


Hgb  14.5 gm/dl (11.8-15.2)   11/25/19  04:32    


 


Hct  43.3 % (35.5-45.6)   11/25/19  04:32    


 


MCV  100 fl (84-94)  H  11/25/19  04:32    


 


MCH  34 pg (28-32)  H  11/25/19  04:32    


 


MCHC  33 % (32-34)   11/25/19  04:32    


 


RDW  13.4 % (13.2-15.2)   11/25/19  04:32    


 


Plt Count  371 K/mm3 (140-440)   11/25/19  04:32    


 


Lymph % (Auto)  9.6 % (13.4-35.0)  L  11/25/19  04:32    


 


Mono % (Auto)  3.4 % (0.0-7.3)   11/25/19  04:32    


 


Eos % (Auto)  0.0 % (0.0-4.3)   11/25/19  04:32    


 


Baso % (Auto)  0.6 % (0.0-1.8)   11/25/19  04:32    


 


Lymph #  0.4 K/mm3 (1.2-5.4)  L  11/25/19  04:32    


 


Mono #  0.1 K/mm3 (0.0-0.8)   11/25/19  04:32    


 


Eos #  0.0 K/mm3 (0.0-0.4)   11/25/19  04:32    


 


Baso #  0.0 K/mm3 (0.0-0.1)   11/25/19  04:32    


 


Seg Neutrophils %  86.4 % (40.0-70.0)  H  11/25/19  04:32    


 


Seg Neutrophils #  3.5 K/mm3 (1.8-7.7)   11/25/19  04:32    


 


PT  30.5 Sec. (12.2-14.9)  H  11/24/19  11:10    


 


INR  3.00  (0.87-1.13)  H  11/24/19  11:10    


 


APTT  29.7 Sec. (24.2-36.6)   11/24/19  11:10    


 


POC ABG pH  7.348  (7.35-7.45)  L  11/25/19  04:18    


 


POC ABG pCO2  44.6  (35-45)   11/25/19  04:18    


 


POC ABG pO2  108  ()  H  11/25/19  04:18    


 


POC ABG HCO3  24.5  (22-26 mml/L)   11/25/19  04:18    


 


POC ABG Total CO2  26  (23-27mmol/L)   11/25/19  04:18    


 


POC ABG O2 Sat  98   11/25/19  04:18    


 


POC ABG Base Excess  -1  ((-2) - (+3)mmol/L)   11/25/19  04:18    


 


FiO2  30 %  11/25/19  04:18    


 


Sodium  141 mmol/L (137-145)   11/25/19  04:32    


 


Potassium  4.8 mmol/L (3.6-5.0)  D 11/25/19  04:32    


 


Chloride  107.5 mmol/L ()  H  11/25/19  04:32    


 


Carbon Dioxide  23 mmol/L (22-30)   11/25/19  04:32    


 


Anion Gap  15 mmol/L  11/25/19  04:32    


 


BUN  10 mg/dL (9-20)   11/25/19  04:32    


 


Creatinine  0.9 mg/dL (0.8-1.5)   11/25/19  04:32    


 


Estimated GFR  > 60 ml/min  11/25/19  04:32    


 


BUN/Creatinine Ratio  11 %  11/25/19  04:32    


 


Glucose  118 mg/dL ()  H  11/25/19  04:32    


 


Hemoglobin A1c  5.3 % (4-6)   11/24/19  11:10    


 


Calcium  9.4 mg/dL (8.4-10.2)   11/25/19  04:32    


 


Phosphorus  2.90 mg/dL (2.5-4.5)   11/24/19  19:30    


 


Magnesium  1.70 mg/dL (1.7-2.3)   11/24/19  19:30    


 


Total Bilirubin  0.40 mg/dL (0.1-1.2)   11/25/19  04:32    


 


Direct Bilirubin  < 0.2 mg/dL (0-0.2)   11/24/19  11:10    


 


Indirect Bilirubin  0.2 mg/dL  11/24/19  11:10    


 


AST  73 units/L (5-40)  H  11/25/19  04:32    


 


ALT  56 units/L (7-56)   11/25/19  04:32    


 


Alkaline Phosphatase  82 units/L ()   11/25/19  04:32    


 


Ammonia  29.0 umol/L (25-60)   11/24/19  15:02    


 


Total Creatine Kinase  215 units/L ()  H  11/24/19  11:10    


 


CK-MB (CK-2)  4.5 ng/mL (0.0-4.0)  H  11/24/19  11:10    


 


CK-MB (CK-2) Rel Index  2.0  (0-4)   11/24/19  11:10    


 


Troponin T  < 0.010 ng/mL (0.00-0.029)   11/24/19  11:10    


 


NT-Pro-B Natriuret Pep  33.88 pg/mL (0-450)   11/24/19  11:10    


 


Total Protein  7.4 g/dL (6.3-8.2)   11/25/19  04:32    


 


Albumin  3.2 g/dL (3.9-5)  L  11/25/19  04:32    


 


Albumin/Globulin Ratio  0.8 %  11/25/19  04:32    


 


Amylase  438 units/L ()  H  11/24/19  19:30    


 


Lipase  8 units/L (13-60)  L  11/24/19  11:10    


 


Urine Color  Yellow  (Yellow)   11/24/19  11:59    


 


Urine Turbidity  Clear  (Clear)   11/24/19  11:59    


 


Urine pH  6.0  (5.0-7.0)   11/24/19  11:59    


 


Ur Specific Gravity  1.020  (1.003-1.030)   11/24/19  11:59    


 


Urine Protein  <15 mg/dl mg/dL (Negative)   11/24/19  11:59    


 


Urine Glucose (UA)  Neg mg/dL (Negative)   11/24/19  11:59    


 


Urine Ketones  Tr mg/dL (Negative)   11/24/19  11:59    


 


Urine Blood  Sm  (Negative)   11/24/19  11:59    


 


Urine Nitrite  Neg  (Negative)   11/24/19  11:59    


 


Urine Bilirubin  Neg  (Negative)   11/24/19  11:59    


 


Urine Urobilinogen  4.0 mg/dL (<2.0)   11/24/19  11:59    


 


Ur Leukocyte Esterase  Neg  (Negative)   11/24/19  11:59    


 


Urine WBC (Auto)  3.0 /HPF (0.0-6.0)   11/24/19  11:59    


 


Urine RBC (Auto)  25.0 /HPF (0.0-6.0)   11/24/19  11:59    


 


U Epithel Cells (Auto)  1.0 /HPF (0-13.0)   11/24/19  11:59    


 


Calcium Oxalate Crystal  Few   11/24/19  11:59    


 


Urine Mucus  Few /HPF  11/24/19  11:59    


 


Salicylates  < 0.3 mg/dL (2.8-20.0)  L  11/24/19  11:10    


 


Urine Opiates Screen  Presumptive negative   11/24/19  09:57    


 


Urine Methadone Screen  Presumptive negative   11/24/19  09:57    


 


Acetaminophen  < 5.0 ug/mL (10.0-30.0)  L  11/24/19  11:10    


 


Ur Barbiturates Screen  Presumptive negative   11/24/19  09:57    


 


Ur Phencyclidine Scrn  Presumptive negative   11/24/19  09:57    


 


Ur Amphetamines Screen  Presumptive negative   11/24/19  09:57    


 


U Benzodiazepines Scrn  Presumptive positive   11/24/19  09:57    


 


Urine Cocaine Screen  Presumptive positive   11/24/19  09:57    


 


U Marijuana (THC) Screen  Presumptive negative   11/24/19  09:57    


 


Drugs of Abuse Note  Disclamer   11/24/19  09:57    


 


Plasma/Serum Alcohol  < 0.01 % (0-0.07)   11/24/19  11:10    


 


Blood Type  O POSITIVE   11/24/19  10:05    


 


Antibody Screen  Negative   11/24/19  10:05    














- Imaging and Cardiology


CT scan - abdomen: report reviewed, image reviewed


CT Scan - head: report reviewed, image reviewed


Imaging and Cardiology: 





Tibial and fibular x-ray.  Cervical neck film.  All within normal limits.  Q 

findings.





Active Medications





- Current Medications


Current Medications: 














Generic Name Dose Route Start Last Admin





  Trade Name Freq  PRN Reason Stop Dose Admin


 


Acetaminophen  650 mg  11/24/19 19:14 





  Tylenol  PO  





  Q4H PRN  





  Pain MILD(1-3)/Fever >100.5/HA  


 


Albuterol  2.5 mg  11/24/19 19:53 





  Proventil  IH  





  Q4HRT PRN  





  Shortness Of Breath  


 


Albuterol/Ipratropium  1 ampul  11/24/19 20:00  11/25/19 08:24





  Duoneb *Not For Prn Use*  IH   1 ampul





  QIDRT MARIE   Administration


 


Lipase/Protease/Amylase  1 each  11/24/19 19:28 





  Pancreanya Holder 10,500 Unit  FEEDTUBE  





  PRN PRN  





  For Clogged Feeding Tube  


 


Famotidine  20 mg  11/24/19 22:00  11/25/19 09:08





  Pepcid  IV   20 mg





  BID MARIE   Administration


 


Heparin Sodium (Porcine)  5,000 unit  11/25/19 10:00  11/25/19 09:08





  Heparin  SUB-Q   5,000 unit





  Q12HR MARIE   Administration


 


Hydromorphone HCl  0.5 mg  11/24/19 19:14 





  Dilaudid  IV  





  Q3H PRN  





  Pain , Severe (7-10)  


 


Hydrophilic Ointment  1 applic  11/24/19 09:47 





  Vaseline Lip Therapy  TP  





  Q2HR PRN  





  Dry Lips  


 


Propofol  1,000 mg in 100 mls @ 2.177 mls/hr  11/24/19 10:00  11/25/19 10:24





  Diprivan 10 Mg/Ml  IV   5 mcg/kg/min





  TITR MARIE   2.177 mls/hr





    Titration





  Protocol  





  5 MCG/KG/MIN  


 


Dextrose/Sodium Chloride  1,000 mls @ 100 mls/hr  11/24/19 20:00  11/25/19 09:32





  D5ns  IV   100 mls/hr





  AS DIRECT MARIE   Administration


 


Levofloxacin/Dextrose  750 mg in 150 mls @ 100 mls/hr  11/24/19 20:00  11/25/19 

00:22





  Levaquin 750mg/150ml  IV   100 mls/hr





  Q24H MARIE   Administration





  Protocol  


 


Lorazepam  2 mg  11/24/19 19:30 





  Ativan  IV  





  Q1H PRN  





  CIWA-Ar 8-15  


 


Lorazepam  4 mg  11/24/19 19:30 





  Ativan  IV  





  Q1H PRN  





  CIWA-Ar 16-25  


 


Methylprednisolone Sodium Succinate  40 mg  11/24/19 22:00  11/25/19 06:22





  Solu-Medrol  IV   40 mg





  Q8HR MARIE   Administration


 


Multi-Ingred Cream/Lotion/Oil/Oint  1 applic  11/24/19 09:47 





  Artificial Tears Ophth Oint  OU  





  Q4HR PRN  





  Dry Eye(s)  


 


Ondansetron HCl  4 mg  11/24/19 19:14 





  Zofran  IV  





  Q8H PRN  





  Nausea And Vomiting  


 


Simple Syrup  15 ml  11/24/19 19:28 





  Simple Syrup  FEEDTUBE  





  PRN PRN  





  Hypoglycemia  


 


Simple Syrup  30 ml  11/24/19 19:28 





  Simple Syrup  FEEDTUBE  





  PRN PRN  





  Hypoglycemia  


 


Sodium Bicarbonate  325 mg  11/24/19 19:28 





  Sodium Bicarbonate  FEEDTUBE  





  PRN PRN  





  For Clogged Feeding Tube  


 


Sodium Chloride  10 ml  11/24/19 22:00  11/25/19 09:08





  Sodium Chloride Flush Syringe 10 Ml  IV   10 ml





  BID MARIE   Administration


 


Sodium Chloride  10 ml  11/24/19 19:14 





  Sodium Chloride Flush Syringe 10 Ml  IV  





  PRN PRN  





  LINE FLUSH

## 2019-11-25 NOTE — XRAY REPORT
EXAMINATION: Abdominal radiograph, one view, 11/25/2019



CLINICAL INFORMATION: Nasogastric tube placement



COMPARISON: None.



FINDINGS: The distal tip of an esophagogastric tube overlies the gastric fundus. The bowel gas patter
n appears grossly nonobstructive. There is a moderate amount of retained stool throughout the colon.



IMPRESSION: 

1. Placement of esophagogastric tube as above.



Signer Name: Jemma Hurley MD 

Signed: 11/25/2019 3:16 AM

 Workstation Name: [facility]

## 2019-11-25 NOTE — CONSULTATION
History of Present Illness


Consult date: 11/25/19


Requesting physician: MANISH PATEL


Reason for consult: dyspnea, other (Acute respiratory failure)


History of present illness: 





49 yo found by police threatening to jump off a bridge to commit suicide. 

Apparently he fell instead, developed increased SOB, wheezing, work of 

breathing, and altered mentation thereafter. He required intubation in ED and is

now in ICU sedated on Propofol. He cannot furnish history so it is obtained 

exclusively via chart review. No family present.











Past History


Past Medical History: other (COPD, Cocaine abuse)


Past Surgical History: Other (Cannot obtain as intubated/sedated)


Social history: smoking, full code, other (Cocaine abuse)


Family history: other (Cannot obtain as intubated/sedated)





Medications and Allergies


                                    Allergies











Allergy/AdvReac Type Severity Reaction Status Date / Time


 


No Known Allergies Allergy   Verified 11/24/19 10:08











                                Home Medications











 Medication  Instructions  Recorded  Confirmed  Last Taken  Type


 


Unobtainable  11/24/19 11/24/19 Unknown History











Active Meds: 


Active Medications





Acetaminophen (Tylenol)  650 mg PO Q4H PRN


   PRN Reason: Pain MILD(1-3)/Fever >100.5/HA


Albuterol (Proventil)  2.5 mg IH Q4HRT PRN


   PRN Reason: Shortness Of Breath


Albuterol/Ipratropium (Duoneb *Not For Prn Use*)  1 ampul [facility]IDRT [facility]


   Last Admin: 11/25/19 20:30 Dose:  1 ampul


   Documented by: 


Lipase/Protease/Amylase (Pancreaze Dr 10,500 Unit)  1 each FEEDTUBE PRN PRN


   PRN Reason: For Clogged Feeding Tube


Famotidine (Pepcid)  20 mg IV BID [facility]


   Last Admin: 11/25/19 21:05 Dose:  20 mg


   Documented by: 


Heparin Sodium (Porcine) (Heparin)  5,000 unit SUB-Q Q12HR [facility]


   Last Admin: 11/25/19 21:04 Dose:  5,000 unit


   Documented by: 


Hydromorphone HCl (Dilaudid)  0.5 mg IV Q3H PRN


   PRN Reason: Pain , Severe (7-10)


Hydrophilic Ointment (Vaseline Lip Therapy)  1 applic TP Q2HR PRN


   PRN Reason: Dry Lips


Propofol (Diprivan 10 Mg/Ml)  1,000 mg in 100 mls @ 2.177 mls/hr IV TITR MARIE; 

Protocol


   Last Titration: 11/25/19 17:05 Dose:  15 mcg/kg/min, 6.532 mls/hr


   Documented by: 


Dextrose/Sodium Chloride (D5ns)  1,000 mls @ 100 mls/hr IV AS DIRECT MARIE


   Last Admin: 11/25/19 20:00 Dose:  100 mls/hr


   Documented by: 


Levofloxacin/Dextrose (Levaquin 750mg/150ml)  750 mg in 150 mls @ 100 mls/hr IV 

Q24H MARIE; Protocol


   Last Admin: 11/25/19 21:04 Dose:  100 mls/hr


   Documented by: 


Lorazepam (Ativan)  2 mg IV Q1H PRN


   PRN Reason: HERMINIA-Ar 8-15


   Last Admin: 11/25/19 12:46 Dose:  2 mg


   Documented by: 


Lorazepam (Ativan)  4 mg IV Q1H PRN


   PRN Reason: Heron 16-25


   Last Admin: 11/25/19 16:43 Dose:  4 mg


   Documented by: 


Methylprednisolone Sodium Succinate (Solu-Medrol)  40 mg IV Q8HR MARIE


   Last Admin: 11/25/19 21:04 Dose:  40 mg


   Documented by: 


Multi-Ingred Cream/Lotion/Oil/Oint (Artificial Tears Ophth Oint)  1 applic OU 

Q4HR PRN


   PRN Reason: Dry Eye(s)


Ondansetron HCl (Zofran)  4 mg IV Q8H PRN


   PRN Reason: Nausea And Vomiting


Simple Syrup (Simple Syrup)  15 ml FEEDTUBE PRN PRN


   PRN Reason: Hypoglycemia


Simple Syrup (Simple Syrup)  30 ml FEEDTUBE PRN PRN


   PRN Reason: Hypoglycemia


Sodium Bicarbonate (Sodium Bicarbonate)  325 mg FEEDTUBE PRN PRN


   PRN Reason: For Clogged Feeding Tube


Sodium Chloride (Sodium Chloride Flush Syringe 10 Ml)  10 ml IV BID MARIE


   Last Admin: 11/25/19 21:05 Dose:  10 ml


   Documented by: 


Sodium Chloride (Sodium Chloride Flush Syringe 10 Ml)  10 ml IV PRN PRN


   PRN Reason: LINE FLUSH











Review of Systems


ROS unobtainable: due to endotracheal tube, due to mental status





Physical Examination


Vital signs: 


                                   Vital Signs











Temp Pulse Resp BP Pulse Ox


 


 98 F   110 H  16   98/49   96 


 


 11/24/19 09:58  11/24/19 09:58  11/24/19 09:58  11/24/19 09:58  11/24/19 09:58











General appearance: other (intubated, sedated, critically ill on ventilator)


Eyes: non-icteric


ENT: oropharynx moist


Neck: supple


Effort: normal


Ascultation: Bilateral: wheezes (w/ prolonged expiratory phase)


Cardiovascular: regular rate and rhythm (no mrg)


Gastrointestinal: normoactive bowel sounds, soft, non-tender, non-distended


Integumentary: normal


Extremities: no cyanosis, no edema, pink and warm


Musculoskeletal: no deformities


other (Cannot obtain as intubated/sedated)


other (Cannot obtain as intubated/sedated)





Results





- Laboratory Findings


CBC and BMP: 


                                 11/25/19 04:32





                                 11/25/19 04:32


ABG











POC ABG pH  7.348  (7.35-7.45)  L  11/25/19  04:18    


 


POC ABG pCO2  44.6  (35-45)   11/25/19  04:18    


 


POC ABG pO2  108  ()  H  11/25/19  04:18    


 


POC ABG HCO3  24.5  (22-26 mml/L)   11/25/19  04:18    


 


POC ABG Total CO2  26  (23-27mmol/L)   11/25/19  04:18    


 


POC ABG O2 Sat  98   11/25/19  04:18    





PT/INR, D-dimer











PT  30.5 Sec. (12.2-14.9)  H  11/24/19  11:10    


 


INR  3.00  (0.87-1.13)  H  11/24/19  11:10    








Abnormal lab findings: 


                                  Abnormal Labs











  11/24/19 11/24/19 11/24/19





  11:10 11:10 11:10


 


WBC   


 


MCV  99 H  


 


MCH  34 H  


 


RDW  13.1 L  


 


Lymph % (Auto)   


 


Mono % (Auto)  12.1 H  


 


Baso % (Auto)  1.9 H  


 


Lymph #   


 


Mono #  1.2 H  


 


Baso #  0.2 H  


 


Seg Neutrophils %   


 


PT   30.5 H 


 


INR   3.00 H 


 


POC ABG pH   


 


POC ABG pCO2   


 


POC ABG pO2   


 


Chloride   


 


Carbon Dioxide    18 L


 


Glucose    103 H


 


AST    58 H


 


Total Creatine Kinase    215 H


 


CK-MB (CK-2)    4.5 H


 


Albumin    3.1 L


 


Amylase   


 


Lipase    8 L


 


Salicylates   


 


Acetaminophen   














  11/24/19 11/24/19 11/24/19





  11:10 11:10 11:33


 


WBC   


 


MCV   


 


MCH   


 


RDW   


 


Lymph % (Auto)   


 


Mono % (Auto)   


 


Baso % (Auto)   


 


Lymph #   


 


Mono #   


 


Baso #   


 


Seg Neutrophils %   


 


PT   


 


INR   


 


POC ABG pH    7.291 L


 


POC ABG pCO2    49.7 H


 


POC ABG pO2    234 H


 


Chloride   


 


Carbon Dioxide   


 


Glucose   


 


AST   


 


Total Creatine Kinase   


 


CK-MB (CK-2)   


 


Albumin   


 


Amylase   


 


Lipase   


 


Salicylates  < 0.3 L  


 


Acetaminophen   < 5.0 L 














  11/24/19 11/25/19 11/25/19





  19:30 04:18 04:32


 


WBC    4.0 L


 


MCV    100 H


 


MCH    34 H


 


RDW   


 


Lymph % (Auto)    9.6 L


 


Mono % (Auto)   


 


Baso % (Auto)   


 


Lymph #    0.4 L


 


Mono #   


 


Baso #   


 


Seg Neutrophils %    86.4 H


 


PT   


 


INR   


 


POC ABG pH   7.348 L 


 


POC ABG pCO2   


 


POC ABG pO2   108 H 


 


Chloride   


 


Carbon Dioxide   


 


Glucose   


 


AST   


 


Total Creatine Kinase   


 


CK-MB (CK-2)   


 


Albumin  3.2 L  


 


Amylase  438 H  


 


Lipase   


 


Salicylates   


 


Acetaminophen   














  11/25/19





  04:32


 


WBC 


 


MCV 


 


MCH 


 


RDW 


 


Lymph % (Auto) 


 


Mono % (Auto) 


 


Baso % (Auto) 


 


Lymph # 


 


Mono # 


 


Baso # 


 


Seg Neutrophils % 


 


PT 


 


INR 


 


POC ABG pH 


 


POC ABG pCO2 


 


POC ABG pO2 


 


Chloride  107.5 H


 


Carbon Dioxide 


 


Glucose  118 H


 


AST  73 H


 


Total Creatine Kinase 


 


CK-MB (CK-2) 


 


Albumin  3.2 L


 


Amylase 


 


Lipase 


 


Salicylates 


 


Acetaminophen 














- Diagnostic Findings


Chest x-ray: report reviewed, image reviewed (severe upper lobe bullous 

emphysema, o/w clear)





Assessment and Plan





Imp:


1. Bullous emphysema


2. COPD exac.


3. Acute respiratory failure, hypoxia and hypercapnea


4. Chronic nicotine dependence, cigarettes


5. Cocaine abuse


6. Suicide attempt





Rec:


1. Duonebs, Solumedrol, Levaquin


2. Keep intubated today; PSV QAM with sedation holiday; if continued agitation 

off sedation recommend Precedex


3. Psych consult pending


4. DVT PPx and GI PPx as ordered


5. Further plans pending clinical course; no family present





CCT 31 minutes

## 2019-11-25 NOTE — HISTORY AND PHYSICAL REPORT
CHIEF COMPLAINT:  Altered sensorium and respiratory distress.



HISTORY OF PRESENT ILLNESS:  A 48-year-old male who came to the attention of

paramedics.  Police found him sitting on the bridge and threatening to jump off

it.  The patient was apparently trying to commit suicide from jumping off the

bridge, but did not fell off the bridge.  He fell over 4 feet height and had

some neck pain.  The patient arrived in agonal respirations, because of which

patient was intubated.  No fever or chills.  The patient is on crack cocaine

abuse.  Suicidal intention.  No hallucinations or delusions.



PAST MEDICAL HISTORY:  Unobtainable.



PAST SURGICAL HISTORY:  Unobtainable.



SOCIAL HISTORY:  Cocaine use, probably a smoker.



FAMILY HISTORY:  Unavailable.



REVIEW OF SYSTEMS:  Significant for severe respiratory distress and altered

sensorium.



PHYSICAL EXAMINATION:

GENERAL:  The patient intubated.

VITAL SIGNS:  Blood pressure is 92/62, temperature is 97.4, pulse is 93,

respirations 20.

HEENT:  Unremarkable.  Pupils equal and reactive.

NECK:  Supple, no lymphadenopathy, no thyromegaly.

LUNGS:  Scattered rhonchi bilaterally.

CARDIOVASCULAR:  S1, S2 heard.  No gallop, no murmur, no rub.  Apical impulse in

left fifth intercostal space and midclavicular line.

ABDOMEN:  Soft and benign.  No hepatosplenomegaly.  No guarding, no rigidity. 

Hernial orifices are normal.

EXTREMITIES:  Good pedal pulses.  No pedal edema.

CENTRAL NERVOUS SYSTEM:  The patient is sedated.  CNS exam could not be done.



LABORATORY DATA:  Significant for a white count of 4000.  Initial white count

was 9800, H and H is 13.1 and 38.5, MCV is 99, MCH is 34.  ABG significant for

pH of 7.29, pCO2 of 49.7, pO2 of 234.  Sodium is 137, potassium is 3.8, glucose

is 103.  Albumin is 3.1, slightly low.  Amylase was normal, initially was 438,

lipase 8.  Urine negative.  Drug screen was positive for cocaine and benzos. 

EKG, sinus tachycardia.  Abdominal CAT scan shows no acute abnormality of the

abdomen or pelvis.  Cervical spine CT shows no acute findings.  No indication of

fracture or traumatic subluxation.  Increase soft tissue fullness in the

prevertebral and retropharyngeal region may represent retained secretions

secondary to nasotracheal intubation.  Extensive biapical bullous changes in

both lung apices.  Head CT shows no acute intracranial abnormalities and

additional findings as above.  Tibia, fibula, no fractures.  Chest x-ray shows

no acute abnormalities of chest.



ASSESSMENT AND PLAN:

1.  Acute respiratory failure.  The patient intubated.  Vent support to be

continued.  Intensivist consult requested.  DuoNebs q.i.d. and p.r.n.  The

patient also initiated on empiric antibiotics.  Need for Solu-Medrol not present

at this point.  Suicidal ideation.  Mental health consult once the patient is

extubated and alert and oriented.

2.  Malnutrition.  Dietitian consult requested.

3.  Cocaine dependence.  The patient initiated on CIWA protocol for possible

withdrawal symptoms.

4.  Hypotension.  Continue IV fluids.

5.  Deep venous thrombosis prophylaxis, patient on heparin 5000 q.12.



Critical care time 40 minutes.





DD: 11/25/2019

DT: 11/25/2019 

JOB# [id number]  [id number]

STACI/LUCILLE VARGAS

## 2019-11-26 RX ADMIN — DEXMEDETOMIDINE SCH MLS/HR: 100 INJECTION, SOLUTION INTRAVENOUS at 13:58

## 2019-11-26 RX ADMIN — HEPARIN SODIUM SCH UNIT: 5000 INJECTION, SOLUTION INTRAVENOUS; SUBCUTANEOUS at 22:54

## 2019-11-26 RX ADMIN — FAMOTIDINE SCH MG: 10 INJECTION, SOLUTION INTRAVENOUS at 10:25

## 2019-11-26 RX ADMIN — PROPOFOL SCH MLS/HR: 10 INJECTION, EMULSION INTRAVENOUS at 07:38

## 2019-11-26 RX ADMIN — METHYLPREDNISOLONE SODIUM SUCCINATE SCH MG: 125 INJECTION, POWDER, FOR SOLUTION INTRAMUSCULAR; INTRAVENOUS at 13:58

## 2019-11-26 RX ADMIN — METHYLPREDNISOLONE SODIUM SUCCINATE SCH MG: 125 INJECTION, POWDER, FOR SOLUTION INTRAMUSCULAR; INTRAVENOUS at 22:55

## 2019-11-26 RX ADMIN — HEPARIN SODIUM SCH UNIT: 5000 INJECTION, SOLUTION INTRAVENOUS; SUBCUTANEOUS at 10:25

## 2019-11-26 RX ADMIN — METHYLPREDNISOLONE SODIUM SUCCINATE SCH MG: 125 INJECTION, POWDER, FOR SOLUTION INTRAMUSCULAR; INTRAVENOUS at 05:39

## 2019-11-26 RX ADMIN — IPRATROPIUM BROMIDE AND ALBUTEROL SULFATE SCH AMPUL: .5; 3 SOLUTION RESPIRATORY (INHALATION) at 20:07

## 2019-11-26 RX ADMIN — FAMOTIDINE SCH MG: 10 INJECTION, SOLUTION INTRAVENOUS at 22:55

## 2019-11-26 RX ADMIN — IPRATROPIUM BROMIDE AND ALBUTEROL SULFATE SCH AMPUL: .5; 3 SOLUTION RESPIRATORY (INHALATION) at 16:01

## 2019-11-26 RX ADMIN — Medication SCH ML: at 10:25

## 2019-11-26 RX ADMIN — IPRATROPIUM BROMIDE AND ALBUTEROL SULFATE SCH AMPUL: .5; 3 SOLUTION RESPIRATORY (INHALATION) at 07:30

## 2019-11-26 RX ADMIN — IPRATROPIUM BROMIDE AND ALBUTEROL SULFATE SCH AMPUL: .5; 3 SOLUTION RESPIRATORY (INHALATION) at 12:14

## 2019-11-26 NOTE — XRAY REPORT
CHEST 1 VIEW, 11/26/2019 2:29 AM 



CLINICAL INFORMATION/INDICATION: Respiratory failure



COMPARISON: Chest radiograph, 11/25/2019 at 211



FINDINGS:



SUPPORT DEVICES: The endotracheal tube and esophagogastric tube project in stable position.

HEART: The cardiac silhouette is normal in size.

LUNGS/PLEURA: Diffuse chronic interstitial changes with extensive bullous disease is again noted. No 
definite superimposed airspace disease or large pneumothorax is identified.

ADDITIONAL FINDINGS: No additional acute findings.



IMPRESSION:

1. Stable appearance of the chest.



Signer Name: Jemma Hurley MD 

Signed: 11/26/2019 2:46 AM

 Workstation Name: [id number]

## 2019-11-26 NOTE — PROGRESS NOTE
Assessment and Plan


Assessment and plan: 


patient 48 year old with a history of emphysema, crack cocaine abuse and tobacco

abuse presents after being found on a bridge with a failed attempt at suicide.  

Patient was threatening to jump off a bridge he ended up falling down 

approximately 4 foot embankment.  Patient was brought to the ED was found to be 

in acute distress with agonal breathing.  Patient required intubation because of

acute hypoxemia.   Patient will require psych consult and 1013 after he wakes up

and is more stable and responsive.








CT Cervical Spine: IMPRESSION: 1. No indication of fracture or traumatic ellis

bluxation. 2. Increased soft tissue fullness in the prevertebral and 

retropharyngeal region may represent retained secretions secondary to 

nasotracheal intubation, however, follow-up study is suggested to exclude other 

retropharyngeal or prevertebral pathology such as mass or hematoma. 3. Extensive

biapical bullous changes in both lung apices. 


chest xray: IMPRESSION: 1. Stable appearance of the chest. No acute Pathology


Bilateral Tib/fiB XRAY: IMPRESSION: No acute abnormality of either tibia/fibula.




CT A/P IMPRESSION: 1. Limited noncontrast exam demonstrating no acute 

abnormality of the abdomen or pelvis. 2. Additional findings as above. 


CT HEAD/BRAIN: IMPRESSION: Cranial CT scan within normal limits. Mild sinus 

disease, likely chronic.








- Patient Problems


(1) COPD exacerbation


Current Visit: No   Status: Acute   


Plan to address problem: 


Patient has wheezing and emphysema.  Chest x-ray consistent with emphysema.  

Most likely exacerbated by suicide attempt and crack abuse.


Pulmonary following





Tobacco use disorder- will provide counselling when awake





(2) Respiratory failure on mechanical ventilation for >48 hrs


Current Visit: Yes   Status: Acute   


Qualifiers: 


   Chronicity: acute   Respiratory failure complication: unspecified whether 

with hypoxia or hypercapnia   Qualified Code(s): J96.00 - Acute respiratory 

failure, unspecified whether with hypoxia or hypercapnia   


Plan to address problem: 


Respiratory failure I reviewed all CT scans head chest abdomen pelvis chest x-

ray.  All workup nothing new.  The to help with underlying condition and workup.


Pulmonary following the patient


?Pharyngeal swelling, will discuss with Pulmonary about repeat





(3) Cocaine abuse


Current Visit: Yes   Status: Acute   


Plan to address problem: 


Patient is no longer tachycardic or shows any evidence of crack overdose of 

cocaine overdose at this particular time.  Remains intubated however.





(4) Suicidal ideation


Current Visit: Yes   Status: Acute   


Plan to address problem: 


Patient with suicide attempt.   1013 


Mental health consult placed








(5) Severe Protien Malnutrition


Current Visit: Yes   Status: Acute   


Plan to address problem: 


Most likely secondary to drug abuse patient remains cachectic.  We'll encourage 

boost once patient is extubated.











The high probability of a clinically significant, sudden or life threatening 

deterioration of the [Pulmonary, Mental] system(s) required my full and direct 

attention, intervention and personal management. The aggregate critical care 

time was [35] minutes. This time is in addition to time spent performing 

reported procedures but includes the following: 





[x] Data Review and interpretation 





[x]x Patient assessment and monitoring of vital signs 





[x] Documentation 





[x] Medication orders and management


Total Time Spent with Patient (Minutes): 35





History


Interval history: 


Patient seen and examined, no family at bedside. Per information reviewed, archie kidd previous accepted in a rehab facility last year but did not go. He remains 

on the vent, still confused, and agitated when off sedative.





Hospitalist Physical





- Constitutional


Vitals: 


                                        











Temp Pulse Resp BP Pulse Ox


 


 99 F   95 H  19   126/89   97 


 


 11/26/19 04:00  11/26/19 07:33  11/26/19 07:31  11/26/19 07:33  11/26/19 07:33











General appearance: Present: severe distress, cachectic, disheveled, other 

(intubated sedation.)





- EENT


Eyes: Present: PERRL


ENT: poor dentition, other (ETT in place)





- Neck


Neck: Present: supple, normal ROM





- Respiratory


Respiratory effort: normal


Respiratory: bilateral: CTA (bronchivesicular)





- Cardiovascular


Rhythm: regular


Heart Sounds: Present: S1 & S2.  Absent: systolic murmur





- Extremities


Extremities: no ischemia, pulses intact, pulses symmetrical, No edema, normal 

temperature, normal color, Full ROM


Peripheral Pulses: within normal limits





- Abdominal


General gastrointestinal: soft, non-tender (unable to assess due to mental 

status), non-distended, normal bowel sounds





- Integumentary


Integumentary: Present: warm





- Psychiatric


Psychiatric: other (unable to assess due to mentall status)





- Neurologic


Neurologic: other





- Allied Health


Allied health notes reviewed: nursing





Results





- Labs


CBC & Chem 7: 


                                 11/25/19 04:32





                                 11/25/19 04:32


Labs: 


                             Laboratory Last Values











WBC  4.0 K/mm3 (4.5-11.0)  L  11/25/19  04:32    


 


RBC  4.32 M/mm3 (3.65-5.03)   11/25/19  04:32    


 


Hgb  14.5 gm/dl (11.8-15.2)   11/25/19  04:32    


 


Hct  43.3 % (35.5-45.6)   11/25/19  04:32    


 


MCV  100 fl (84-94)  H  11/25/19  04:32    


 


MCH  34 pg (28-32)  H  11/25/19  04:32    


 


MCHC  33 % (32-34)   11/25/19  04:32    


 


RDW  13.4 % (13.2-15.2)   11/25/19  04:32    


 


Plt Count  371 K/mm3 (140-440)   11/25/19  04:32    


 


Lymph % (Auto)  9.6 % (13.4-35.0)  L  11/25/19  04:32    


 


Mono % (Auto)  3.4 % (0.0-7.3)   11/25/19  04:32    


 


Eos % (Auto)  0.0 % (0.0-4.3)   11/25/19  04:32    


 


Baso % (Auto)  0.6 % (0.0-1.8)   11/25/19  04:32    


 


Lymph #  0.4 K/mm3 (1.2-5.4)  L  11/25/19  04:32    


 


Mono #  0.1 K/mm3 (0.0-0.8)   11/25/19  04:32    


 


Eos #  0.0 K/mm3 (0.0-0.4)   11/25/19  04:32    


 


Baso #  0.0 K/mm3 (0.0-0.1)   11/25/19  04:32    


 


Seg Neutrophils %  86.4 % (40.0-70.0)  H  11/25/19  04:32    


 


Seg Neutrophils #  3.5 K/mm3 (1.8-7.7)   11/25/19  04:32    


 


PT  30.5 Sec. (12.2-14.9)  H  11/24/19  11:10    


 


INR  3.00  (0.87-1.13)  H  11/24/19  11:10    


 


APTT  29.7 Sec. (24.2-36.6)   11/24/19  11:10    


 


POC ABG pH  7.391  (7.35-7.45)   11/26/19  04:13    


 


POC ABG pCO2  40.4  (35-45)   11/26/19  04:13    


 


POC ABG pO2  88  ()   11/26/19  04:13    


 


POC ABG HCO3  24.5  (22-26 mml/L)   11/26/19  04:13    


 


POC ABG Total CO2  26  (23-27mmol/L)   11/26/19  04:13    


 


POC ABG O2 Sat  97   11/26/19  04:13    


 


POC ABG Base Excess  0  ((-2) - (+3)mmol/L)   11/26/19  04:13    


 


FiO2  30 %  11/26/19  04:13    


 


Sodium  141 mmol/L (137-145)   11/25/19  04:32    


 


Potassium  4.8 mmol/L (3.6-5.0)  D 11/25/19  04:32    


 


Chloride  107.5 mmol/L ()  H  11/25/19  04:32    


 


Carbon Dioxide  23 mmol/L (22-30)   11/25/19  04:32    


 


Anion Gap  15 mmol/L  11/25/19  04:32    


 


BUN  10 mg/dL (9-20)   11/25/19  04:32    


 


Creatinine  0.9 mg/dL (0.8-1.5)   11/25/19  04:32    


 


Estimated GFR  > 60 ml/min  11/25/19  04:32    


 


BUN/Creatinine Ratio  11 %  11/25/19  04:32    


 


Glucose  118 mg/dL ()  H  11/25/19  04:32    


 


Hemoglobin A1c  5.3 % (4-6)   11/24/19  11:10    


 


Calcium  9.4 mg/dL (8.4-10.2)   11/25/19  04:32    


 


Phosphorus  2.90 mg/dL (2.5-4.5)   11/24/19  19:30    


 


Magnesium  1.70 mg/dL (1.7-2.3)   11/24/19  19:30    


 


Total Bilirubin  0.40 mg/dL (0.1-1.2)   11/25/19  04:32    


 


Direct Bilirubin  < 0.2 mg/dL (0-0.2)   11/24/19  11:10    


 


Indirect Bilirubin  0.2 mg/dL  11/24/19  11:10    


 


AST  73 units/L (5-40)  H  11/25/19  04:32    


 


ALT  56 units/L (7-56)   11/25/19  04:32    


 


Alkaline Phosphatase  82 units/L ()   11/25/19  04:32    


 


Ammonia  29.0 umol/L (25-60)   11/24/19  15:02    


 


Total Creatine Kinase  215 units/L ()  H  11/24/19  11:10    


 


CK-MB (CK-2)  4.5 ng/mL (0.0-4.0)  H  11/24/19  11:10    


 


CK-MB (CK-2) Rel Index  2.0  (0-4)   11/24/19  11:10    


 


Troponin T  < 0.010 ng/mL (0.00-0.029)   11/24/19  11:10    


 


NT-Pro-B Natriuret Pep  33.88 pg/mL (0-450)   11/24/19  11:10    


 


Total Protein  7.4 g/dL (6.3-8.2)   11/25/19  04:32    


 


Albumin  3.2 g/dL (3.9-5)  L  11/25/19  04:32    


 


Albumin/Globulin Ratio  0.8 %  11/25/19  04:32    


 


Amylase  438 units/L ()  H  11/24/19  19:30    


 


Lipase  8 units/L (13-60)  L  11/24/19  11:10    


 


Urine Color  Yellow  (Yellow)   11/24/19  11:59    


 


Urine Turbidity  Clear  (Clear)   11/24/19  11:59    


 


Urine pH  6.0  (5.0-7.0)   11/24/19  11:59    


 


Ur Specific Gravity  1.020  (1.003-1.030)   11/24/19  11:59    


 


Urine Protein  <15 mg/dl mg/dL (Negative)   11/24/19  11:59    


 


Urine Glucose (UA)  Neg mg/dL (Negative)   11/24/19  11:59    


 


Urine Ketones  Tr mg/dL (Negative)   11/24/19  11:59    


 


Urine Blood  Sm  (Negative)   11/24/19  11:59    


 


Urine Nitrite  Neg  (Negative)   11/24/19  11:59    


 


Urine Bilirubin  Neg  (Negative)   11/24/19  11:59    


 


Urine Urobilinogen  4.0 mg/dL (<2.0)   11/24/19  11:59    


 


Ur Leukocyte Esterase  Neg  (Negative)   11/24/19  11:59    


 


Urine WBC (Auto)  3.0 /HPF (0.0-6.0)   11/24/19  11:59    


 


Urine RBC (Auto)  25.0 /HPF (0.0-6.0)   11/24/19  11:59    


 


U Epithel Cells (Auto)  1.0 /HPF (0-13.0)   11/24/19  11:59    


 


Calcium Oxalate Crystal  Few   11/24/19  11:59    


 


Urine Mucus  Few /HPF  11/24/19  11:59    


 


Salicylates  < 0.3 mg/dL (2.8-20.0)  L  11/24/19  11:10    


 


Urine Opiates Screen  Presumptive negative   11/24/19  09:57    


 


Urine Methadone Screen  Presumptive negative   11/24/19  09:57    


 


Acetaminophen  < 5.0 ug/mL (10.0-30.0)  L  11/24/19  11:10    


 


Ur Barbiturates Screen  Presumptive negative   11/24/19  09:57    


 


Ur Phencyclidine Scrn  Presumptive negative   11/24/19  09:57    


 


Ur Amphetamines Screen  Presumptive negative   11/24/19  09:57    


 


U Benzodiazepines Scrn  Presumptive positive   11/24/19  09:57    


 


Urine Cocaine Screen  Presumptive positive   11/24/19  09:57    


 


U Marijuana (THC) Screen  Presumptive negative   11/24/19  09:57    


 


Drugs of Abuse Note  Disclamer   11/24/19  09:57    


 


Plasma/Serum Alcohol  < 0.01 % (0-0.07)   11/24/19  11:10    


 


Blood Type  O POSITIVE   11/24/19  10:05    


 


Antibody Screen  Negative   11/24/19  10:05    














Active Medications





- Current Medications


Current Medications: 














Generic Name Dose Route Start Last Admin





  Trade Name Freq  PRN Reason Stop Dose Admin


 


Acetaminophen  650 mg  11/24/19 19:14 





  Tylenol  PO  





  Q4H PRN  





  Pain MILD(1-3)/Fever >100.5/HA  


 


Albuterol  2.5 mg  11/24/19 19:53 





  Proventil  IH  





  Q4HRT PRN  





  Shortness Of Breath  


 


Albuterol/Ipratropium  1 ampul  11/24/19 20:00  11/26/19 07:30





  Duoneb *Not For Prn Use*  IH   1 ampul





  QIDRT MARIE   Administration


 


Lipase/Protease/Amylase  1 each  11/24/19 19:28 





  Pancreanya Holder 10,500 Unit  FEEDTUBE  





  PRN PRN  





  For Clogged Feeding Tube  


 


Famotidine  20 mg  11/24/19 22:00  11/25/19 21:05





  Pepcid  IV   20 mg





  BID MARIE   Administration


 


Heparin Sodium (Porcine)  5,000 unit  11/25/19 10:00  11/25/19 21:04





  Heparin  SUB-Q   5,000 unit





  Q12HR MARIE   Administration


 


Hydromorphone HCl  0.5 mg  11/24/19 19:14 





  Dilaudid  IV  





  Q3H PRN  





  Pain , Severe (7-10)  


 


Hydrophilic Ointment  1 applic  11/24/19 09:47 





  Vaseline Lip Therapy  TP  





  Q2HR PRN  





  Dry Lips  


 


Propofol  1,000 mg in 100 mls @ 2.177 mls/hr  11/24/19 10:00  11/26/19 07:38





  Diprivan 10 Mg/Ml  IV   20 mcg/kg/min





  TITR MARIE   8.709 mls/hr





    Administration





  Protocol  





  5 MCG/KG/MIN  


 


Dextrose/Sodium Chloride  1,000 mls @ 100 mls/hr  11/24/19 20:00  11/25/19 20:00





  D5ns  IV   100 mls/hr





  AS DIRECT MARIE   Administration


 


Levofloxacin/Dextrose  750 mg in 150 mls @ 100 mls/hr  11/24/19 20:00  11/25/19 

21:04





  Levaquin 750mg/150ml  IV   100 mls/hr





  Q24H MARIE   Administration





  Protocol  


 


Lorazepam  2 mg  11/24/19 19:30  11/25/19 12:46





  Ativan  IV   2 mg





  Q1H PRN   Administration





  CIWA-Ar 8-15  


 


Lorazepam  4 mg  11/24/19 19:30  11/25/19 16:43





  Ativan  IV   4 mg





  Q1H PRN   Administration





  CIWA-Ar 16-25  


 


Methylprednisolone Sodium Succinate  40 mg  11/24/19 22:00  11/26/19 05:39





  Solu-Medrol  IV   40 mg





  Q8HR MARIE   Administration


 


Multi-Ingred Cream/Lotion/Oil/Oint  1 applic  11/24/19 09:47 





  Artificial Tears Ophth Oint  OU  





  Q4HR PRN  





  Dry Eye(s)  


 


Ondansetron HCl  4 mg  11/24/19 19:14 





  Zofran  IV  





  Q8H PRN  





  Nausea And Vomiting  


 


Simple Syrup  15 ml  11/24/19 19:28 





  Simple Syrup  FEEDTUBE  





  PRN PRN  





  Hypoglycemia  


 


Simple Syrup  30 ml  11/24/19 19:28 





  Simple Syrup  FEEDTUBE  





  PRN PRN  





  Hypoglycemia  


 


Sodium Bicarbonate  325 mg  11/24/19 19:28 





  Sodium Bicarbonate  FEEDTUBE  





  PRN PRN  





  For Clogged Feeding Tube  


 


Sodium Chloride  10 ml  11/24/19 22:00  11/25/19 21:05





  Sodium Chloride Flush Syringe 10 Ml  IV   10 ml





  BID MARIE   Administration


 


Sodium Chloride  10 ml  11/24/19 19:14 





  Sodium Chloride Flush Syringe 10 Ml  IV  





  PRN PRN  





  LINE FLUSH  














Nutrition/Malnutrition Assess





- Dietary Evaluation


Nutrition/Malnutrition Findings: 


                                        





Nutrition Notes                                            Start:  11/25/19 

11:28


Freq:                                                      Status: Active       

 


Protocol:                                                                       

 


 Document     11/25/19 11:28  LP  (Rec: 11/25/19 11:34  LP  [id number])


 Nutrition Notes


     Need for Assessment generated from:         MD Order


     Initial or Follow up                        Assessment


     Current Diagnosis                           COPD,Respiratory Failure,


                                                 Malnutrition


     Other Pertinent Diagnosis                   cocaine abuse, fall


     Current Diet                                NPO


     Labs/Tests                                  Amylase 438


     Pertinent Medications                       D5 NS at 100ml/hr


                                                 Solumedrol


                                                 Propofol 4.3ml/hr


     Height                                      5 ft 8 in


     Weight                                      59.3 kg


     Ideal Body Weight (kg)                      70.00


     BMI                                         19.8


     Subjective/Other Information                Consult for TF. Pt on vent and


                                                 observed fat and muscle loss.


     Burn                                        Absent


     Trauma                                      Absent


     Current % PO                                Negligible


     Minimum of two criteria                     Yes


     Body Fat Depletion                          Mild depletion (non-severe)


     Muscle Mass                                 Mild Depletion (non-severe)


     #1


      Nutrition Diagnosis                        Malnutrition


      Etiology                                   cocaine abuse


      As Evidenced by Signs and Symptoms         Pt noted with muscle and fat


                                                 loss


     Is patient on ventilator?                   Yes


     Is Patient Ambulatory and/or Out of Bed     No


     REE-([place] Je[place]-confined to bed)      [id number]


     Calculation Used for Recommendations        [place]


     Additional Notes                            Protein needs are 71-119g (1.2


                                                 -2g/kg)


                                                 Fluid needs are 1ml/kcal


 Nutrition Intervention


     Change Diet Order:                          TF


     Nutrition Support:                          Vital 1.2 at 60ml/hr


                                                 Water flush 100ml q4h


     Kcal                                        1,728


     Protein (gm)                                109


     Fluid (mL)                                  1,163


     Goal #1                                     Meet at least 80% of kcal and


                                                 protein needs via TF


     Anticipated Discharge Needs:                Unable to determine at this


                                                 time


     Follow-Up By:                               11/27/19


     Additional Comments                         Follow for TF start/tolerance

## 2019-11-26 NOTE — PROGRESS NOTE
Assessment and Plan





Imp:


1. Bullous emphysema


2. COPD exac.


3. Acute respiratory failure, hypoxia and hypercapnea


4. Chronic nicotine dependence, cigarettes


5. Cocaine abuse


6. Suicide attempt





Rec:


1. Duonebs, Solumedrol, Levaquin


2. Keep intubated today; PSV QAM with sedation holiday; wean off Propofol, add 

Klonopin PO BID and Precedex; d/c Dilaudid


3. Psych consult pending


4. DVT PPx and GI PPx as ordered


5. Cuff leak test QAM given ? pharyngeal swelling mentioned on cervical spine CT


6. Further plans pending clinical course; no family present





CCT 31 minutes; no family present





Subjective


Date of service: 11/26/19


Principal diagnosis: Acute respiratory failure


Interval history: 





No events. On Propofol 30mcg and gets severely agitated when weaned. He cannot 

give history.





Active Medications





Acetaminophen (Tylenol)  650 mg PO Q4H PRN


   PRN Reason: Pain MILD(1-3)/Fever >100.5/HA


Albuterol (Proventil)  2.5 mg IH Q4HRT PRN


   PRN Reason: Shortness Of Breath


Albuterol/Ipratropium (Duoneb *Not For Prn Use*)  1 ampul IH QIDRT Former[facility]


   Last Admin: 11/26/19 12:14 Dose:  1 ampul


   Documented by: 


Lipase/Protease/Amylase (Pancreaze Dr 10,500 Unit)  1 each FEEDTUBE PRN PRN


   PRN Reason: For Clogged Feeding Tube


Clonazepam (Klonopin)  0.5 mg PO BID [facility]


Famotidine (Pepcid)  20 mg IV BID [facility]


   Last Admin: 11/26/19 10:25 Dose:  20 mg


   Documented by: 


Heparin Sodium (Porcine) (Heparin)  5,000 unit SUB-Q Q12HR [facility]


   Last Admin: 11/26/19 10:25 Dose:  5,000 unit


   Documented by: 


Hydrophilic Ointment (Vaseline Lip Therapy)  1 applic TP Q2HR PRN


   PRN Reason: Dry Lips


Propofol (Diprivan 10 Mg/Ml)  1,000 mg in 100 mls @ 2.177 mls/hr IV TITR [facility]; 

Protocol


   Stop: 11/26/19 23:59


   Last Titration: 11/26/19 14:46 Dose:  10 mcg/kg/min, 4.355 mls/hr


   Documented by: 


Dextrose/Sodium Chloride (D5ns)  1,000 mls @ 100 mls/hr IV AS DIRECT MARIE


   Last Admin: 11/25/19 20:00 Dose:  100 mls/hr


   Documented by: 


Levofloxacin/Dextrose (Levaquin 750mg/150ml)  750 mg in 150 mls @ 100 mls/hr IV 

Q24H MARIE; Protocol


   Last Admin: 11/25/19 21:04 Dose:  100 mls/hr


   Documented by: 


Dexmedetomidine HCl 200 mcg/ (Sodium Chloride)  50 mls @ 2.965 mls/hr IV TITRATE

MARIE; Protocol


   Last Titration: 11/26/19 14:42 Dose:  0.3 mcg/kg/hr, 4.448 mls/hr


   Documented by: 


Lorazepam (Ativan)  2 mg IV Q1H PRN


   PRN Reason: Heron 8-15


   Last Admin: 11/26/19 09:37 Dose:  2 mg


   Documented by: 


Lorazepam (Ativan)  4 mg IV Q1H PRN


   PRN Reason: Heron 16-25


   Last Admin: 11/25/19 16:43 Dose:  4 mg


   Documented by: 


Methylprednisolone Sodium Succinate (Solu-Medrol)  40 mg IV Q8HR MARIE


   Last Admin: 11/26/19 13:58 Dose:  40 mg


   Documented by: 


Multi-Ingred Cream/Lotion/Oil/Oint (Artificial Tears Ophth Oint)  1 applic OU 

Q4HR PRN


   PRN Reason: Dry Eye(s)


Ondansetron HCl (Zofran)  4 mg IV Q8H PRN


   PRN Reason: Nausea And Vomiting


Simple Syrup (Simple Syrup)  15 ml FEEDTUBE PRN PRN


   PRN Reason: Hypoglycemia


Simple Syrup (Simple Syrup)  30 ml FEEDTUBE PRN PRN


   PRN Reason: Hypoglycemia


Sodium Bicarbonate (Sodium Bicarbonate)  325 mg FEEDTUBE PRN PRN


   PRN Reason: For Clogged Feeding Tube


Sodium Chloride (Sodium Chloride Flush Syringe 10 Ml)  10 ml IV BID MARIE


   Last Admin: 11/26/19 10:25 Dose:  10 ml


   Documented by: 


Sodium Chloride (Sodium Chloride Flush Syringe 10 Ml)  10 ml IV PRN PRN


   PRN Reason: LINE FLUSH











Objective


                               Vital Signs - 12hr











  11/26/19 11/26/19 11/26/19





  03:30 04:00 04:30


 


Temperature  99.0 F 


 


Pulse Rate 110 H 107 H 104 H


 


Pulse Rate [   





Bilateral   





Throughout]   


 


Pulse Rate [  107 H 





From Monitor]   


 


Respiratory 20 20 20





Rate   


 


Respiratory   





Rate [Bilateral   





Throughout]   


 


Blood Pressure 108/66 119/74 116/75


 


O2 Sat by Pulse 94 97 99





Oximetry   














  11/26/19 11/26/19 11/26/19





  04:31 05:00 05:30


 


Temperature   


 


Pulse Rate 103 H 104 H 99 H


 


Pulse Rate [   





Bilateral   





Throughout]   


 


Pulse Rate [   





From Monitor]   


 


Respiratory  21 20





Rate   


 


Respiratory   





Rate [Bilateral   





Throughout]   


 


Blood Pressure 119/74 120/74 125/78


 


O2 Sat by Pulse 98 99 100





Oximetry   














  11/26/19 11/26/19 11/26/19





  06:00 06:31 07:00


 


Temperature   


 


Pulse Rate 101 H 119 H 101 H


 


Pulse Rate [   





Bilateral   





Throughout]   


 


Pulse Rate [   





From Monitor]   


 


Respiratory 21 31 H 25 H





Rate   


 


Respiratory   





Rate [Bilateral   





Throughout]   


 


Blood Pressure 118/73 150/87 144/93


 


O2 Sat by Pulse 91  





Oximetry   














  11/26/19 11/26/19 11/26/19





  07:30 07:31 07:33


 


Temperature   


 


Pulse Rate 84  95 H


 


Pulse Rate [  84 





Bilateral   





Throughout]   


 


Pulse Rate [   





From Monitor]   


 


Respiratory 20  





Rate   


 


Respiratory  19 





Rate [Bilateral   





Throughout]   


 


Blood Pressure 126/89  126/89


 


O2 Sat by Pulse 100  97





Oximetry   














  11/26/19 11/26/19 11/26/19





  08:00 08:01 08:30


 


Temperature 96.8 F L  


 


Pulse Rate  100 H 93 H


 


Pulse Rate [   





Bilateral   





Throughout]   


 


Pulse Rate [ 100 H  





From Monitor]   


 


Respiratory 20 20 18





Rate   


 


Respiratory   





Rate [Bilateral   





Throughout]   


 


Blood Pressure  133/80 129/101


 


O2 Sat by Pulse 100 100 100





Oximetry   














  11/26/19 11/26/19 11/26/19





  09:01 09:31 10:00


 


Temperature   


 


Pulse Rate 101 H 115 H 102 H


 


Pulse Rate [   





Bilateral   





Throughout]   


 


Pulse Rate [   





From Monitor]   


 


Respiratory 21 21 20





Rate   


 


Respiratory   





Rate [Bilateral   





Throughout]   


 


Blood Pressure 162/92 155/94 109/63


 


O2 Sat by Pulse 100 99 99





Oximetry   














  11/26/19 11/26/19 11/26/19





  10:30 11:00 11:30


 


Temperature   


 


Pulse Rate 105 H 102 H 102 H


 


Pulse Rate [   





Bilateral   





Throughout]   


 


Pulse Rate [   





From Monitor]   


 


Respiratory 16 17 20





Rate   


 


Respiratory   





Rate [Bilateral   





Throughout]   


 


Blood Pressure 116/66 116/65 106/58


 


O2 Sat by Pulse 100 100 99





Oximetry   














  11/26/19 11/26/19 11/26/19





  12:00 12:17 12:30


 


Temperature 99.4 F  


 


Pulse Rate 109 H 102 H 108 H


 


Pulse Rate [  84 





Bilateral   





Throughout]   


 


Pulse Rate [ 109 H  





From Monitor]   


 


Respiratory 21  20





Rate   


 


Respiratory  19 





Rate [Bilateral   





Throughout]   


 


Blood Pressure 100/59 106/58 101/56


 


O2 Sat by Pulse 96 99 99





Oximetry   














  11/26/19 11/26/19 11/26/19





  13:00 13:30 14:00


 


Temperature   


 


Pulse Rate 107 H 92 H 101 H


 


Pulse Rate [   





Bilateral   





Throughout]   


 


Pulse Rate [   





From Monitor]   


 


Respiratory 20 20 20





Rate   


 


Respiratory   





Rate [Bilateral   





Throughout]   


 


Blood Pressure 101/55 102/54 93/54


 


O2 Sat by Pulse 99 100 95





Oximetry   














  11/26/19 11/26/19





  14:30 15:00


 


Temperature  


 


Pulse Rate 100 H 96 H


 


Pulse Rate [  





Bilateral  





Throughout]  


 


Pulse Rate [  





From Monitor]  


 


Respiratory 20 20





Rate  


 


Respiratory  





Rate [Bilateral  





Throughout]  


 


Blood Pressure 88/48 97/51


 


O2 Sat by Pulse 96 95





Oximetry  











Constitutional: other (intubated, sedated, critically ill on ventilator)


Eyes: non-icteric


ENT: oropharynx moist


Neck: supple


Effort: normal


Ascultation: Bilateral: clear


Cardiovascular: regular rate and rhythm (no mrg)


Gastrointestinal: normoactive bowel sounds, soft, non-tender, non-distended


Integumentary: normal


Extremities: no cyanosis, no edema, pink and warm


Neurologic: other (Cannot obtain as intubated/sedated)


Psychiatric: other (Cannot obtain as intubated/sedated)


CBC and BMP: 


                                 11/25/19 04:32





                                 11/25/19 04:32


ABG, PT/INR, D-dimer: 


ABG











POC ABG pH  7.391  (7.35-7.45)   11/26/19  04:13    


 


POC ABG pCO2  40.4  (35-45)   11/26/19  04:13    


 


POC ABG pO2  88  ()   11/26/19  04:13    


 


POC ABG HCO3  24.5  (22-26 mml/L)   11/26/19  04:13    


 


POC ABG Total CO2  26  (23-27mmol/L)   11/26/19  04:13    


 


POC ABG O2 Sat  97   11/26/19  04:13    





PT/INR, D-dimer











PT  30.5 Sec. (12.2-14.9)  H  11/24/19  11:10    


 


INR  3.00  (0.87-1.13)  H  11/24/19  11:10    








Abnormal lab findings: 


                                  Abnormal Labs











  11/24/19 11/24/19 11/24/19





  11:10 11:10 11:10


 


WBC   


 


MCV  99 H  


 


MCH  34 H  


 


RDW  13.1 L  


 


Lymph % (Auto)   


 


Mono % (Auto)  12.1 H  


 


Baso % (Auto)  1.9 H  


 


Lymph #   


 


Mono #  1.2 H  


 


Baso #  0.2 H  


 


Seg Neutrophils %   


 


PT   30.5 H 


 


INR   3.00 H 


 


POC ABG pH   


 


POC ABG pCO2   


 


POC ABG pO2   


 


Chloride   


 


Carbon Dioxide    18 L


 


Glucose    103 H


 


AST    58 H


 


Total Creatine Kinase    215 H


 


CK-MB (CK-2)    4.5 H


 


Albumin    3.1 L


 


Amylase   


 


Lipase    8 L


 


Salicylates   


 


Acetaminophen   














  11/24/19 11/24/19 11/24/19





  11:10 11:10 11:33


 


WBC   


 


MCV   


 


MCH   


 


RDW   


 


Lymph % (Auto)   


 


Mono % (Auto)   


 


Baso % (Auto)   


 


Lymph #   


 


Mono #   


 


Baso #   


 


Seg Neutrophils %   


 


PT   


 


INR   


 


POC ABG pH    7.291 L


 


POC ABG pCO2    49.7 H


 


POC ABG pO2    234 H


 


Chloride   


 


Carbon Dioxide   


 


Glucose   


 


AST   


 


Total Creatine Kinase   


 


CK-MB (CK-2)   


 


Albumin   


 


Amylase   


 


Lipase   


 


Salicylates  < 0.3 L  


 


Acetaminophen   < 5.0 L 














  11/24/19 11/25/19 11/25/19





  19:30 04:18 04:32


 


WBC    4.0 L


 


MCV    100 H


 


MCH    34 H


 


RDW   


 


Lymph % (Auto)    9.6 L


 


Mono % (Auto)   


 


Baso % (Auto)   


 


Lymph #    0.4 L


 


Mono #   


 


Baso #   


 


Seg Neutrophils %    86.4 H


 


PT   


 


INR   


 


POC ABG pH   7.348 L 


 


POC ABG pCO2   


 


POC ABG pO2   108 H 


 


Chloride   


 


Carbon Dioxide   


 


Glucose   


 


AST   


 


Total Creatine Kinase   


 


CK-MB (CK-2)   


 


Albumin  3.2 L  


 


Amylase  438 H  


 


Lipase   


 


Salicylates   


 


Acetaminophen   














  11/25/19





  04:32


 


WBC 


 


MCV 


 


MCH 


 


RDW 


 


Lymph % (Auto) 


 


Mono % (Auto) 


 


Baso % (Auto) 


 


Lymph # 


 


Mono # 


 


Baso # 


 


Seg Neutrophils % 


 


PT 


 


INR 


 


POC ABG pH 


 


POC ABG pCO2 


 


POC ABG pO2 


 


Chloride  107.5 H


 


Carbon Dioxide 


 


Glucose  118 H


 


AST  73 H


 


Total Creatine Kinase 


 


CK-MB (CK-2) 


 


Albumin  3.2 L


 


Amylase 


 


Lipase 


 


Salicylates 


 


Acetaminophen 











Chest x-ray: report reviewed, image reviewed (severe bullous disease; lungs 

clear)

## 2019-11-26 NOTE — CONSULTATION
History of Present Illness





- Reason for Consult


Consult date: 11/26/19


Reason for consult: Mental Health Evaluation


Requesting physician: GIOVANNI PARKER





- Chief Complaint


Chief complaint: 


"The patient is intubated'








- History of Present Psychiatric Illness


48 y.o. AA male who presented to the ER for falling down a embankment. Per the 

record, the patient was attempting to jump from a bridge before he fell (suicide

attempt). Today the patient was intubated.  





Medications and Allergies


                                    Allergies











Allergy/AdvReac Type Severity Reaction Status Date / Time


 


No Known Allergies Allergy   Verified 11/24/19 10:08











                                Home Medications











 Medication  Instructions  Recorded  Confirmed  Last Taken  Type


 


Unobtainable  11/24/19 11/24/19 Unknown History











Active Meds: 


Active Medications





Acetaminophen (Tylenol)  650 mg PO Q4H PRN


   PRN Reason: Pain MILD(1-3)/Fever >100.5/HA


Albuterol (Proventil)  2.5 mg IH Q4HRT PRN


   PRN Reason: Shortness Of Breath


Albuterol/Ipratropium (Duoneb *Not For Prn Use*)  1 ampul IH QIDRT [facility]


   Last Admin: 11/26/19 12:14 Dose:  1 ampul


   Documented by: 


Lipase/Protease/Amylase (Pancreaze Dr 10,500 Unit)  1 each FEEDTUBE PRN PRN


   PRN Reason: For Clogged Feeding Tube


Famotidine (Pepcid)  20 mg IV BID [facility]


   Last Admin: 11/26/19 10:25 Dose:  20 mg


   Documented by: 


Heparin Sodium (Porcine) (Heparin)  5,000 unit SUB-Q Q12HR [facility]


   Last Admin: 11/26/19 10:25 Dose:  5,000 unit


   Documented by: 


Hydromorphone HCl (Dilaudid)  0.5 mg IV Q3H PRN


   PRN Reason: Pain , Severe (7-10)


Hydrophilic Ointment (Vaseline Lip Therapy)  1 applic TP Q2HR PRN


   PRN Reason: Dry Lips


Propofol (Diprivan 10 Mg/Ml)  1,000 mg in 100 mls @ 2.177 mls/hr IV TITR [facility]; 

Protocol


   Stop: 11/26/19 23:59


   Last Titration: 11/26/19 09:37 Dose:  30 mcg/kg/min, 13.064 mls/hr


   Documented by: 


Dextrose/Sodium Chloride (D5ns)  1,000 mls @ 100 mls/hr IV AS DIRECT MARIE


   Last Admin: 11/25/19 20:00 Dose:  100 mls/hr


   Documented by: 


Levofloxacin/Dextrose (Levaquin 750mg/150ml)  750 mg in 150 mls @ 100 mls/hr IV 

Q24H MARIE; Protocol


   Last Admin: 11/25/19 21:04 Dose:  100 mls/hr


   Documented by: 


Dexmedetomidine HCl 200 mcg/ (Sodium Chloride)  50 mls @ 2.965 mls/hr IV TITRATE

MARIE; Protocol


Lorazepam (Ativan)  2 mg IV Q1H PRN


   PRN Reason: Heron 8-15


   Last Admin: 11/26/19 09:37 Dose:  2 mg


   Documented by: 


Lorazepam (Ativan)  4 mg IV Q1H PRN


   PRN Reason: Heron 16-25


   Last Admin: 11/25/19 16:43 Dose:  4 mg


   Documented by: 


Methylprednisolone Sodium Succinate (Solu-Medrol)  40 mg IV Q8HR MARIE


   Last Admin: 11/26/19 05:39 Dose:  40 mg


   Documented by: 


Multi-Ingred Cream/Lotion/Oil/Oint (Artificial Tears Ophth Oint)  1 applic OU 

Q4HR PRN


   PRN Reason: Dry Eye(s)


Ondansetron HCl (Zofran)  4 mg IV Q8H PRN


   PRN Reason: Nausea And Vomiting


Simple Syrup (Simple Syrup)  15 ml FEEDTUBE PRN PRN


   PRN Reason: Hypoglycemia


Simple Syrup (Simple Syrup)  30 ml FEEDTUBE PRN PRN


   PRN Reason: Hypoglycemia


Sodium Bicarbonate (Sodium Bicarbonate)  325 mg FEEDTUBE PRN PRN


   PRN Reason: For Clogged Feeding Tube


Sodium Chloride (Sodium Chloride Flush Syringe 10 Ml)  10 ml IV BID [facility]


   Last Admin: 11/26/19 10:25 Dose:  10 ml


   Documented by: 


Sodium Chloride (Sodium Chloride Flush Syringe 10 Ml)  10 ml IV PRN PRN


   PRN Reason: LINE FLUSH











Past psychiatric history





- Past Medical History


Past Medical History: other (Unable to obtain )


Past Surgical History: Other (Unable to obtain )





- past Psychiatric treatment and history


psychiatric treatment history: 


Unable to obtain a psy hx and fam psy hx.








- Social History


Social history: other (Unable to obtain )





Mental Status Exam





- Vital signs


                                Last Vital Signs











Temp  96.8 F L  11/26/19 08:00


 


Pulse  84   11/26/19 12:17


 


Resp  19   11/26/19 12:17


 


BP  106/58   11/26/19 12:17


 


Pulse Ox  99   11/26/19 12:17














- Exam


Narrative exam: 


Unable to complete the MSE because of the patient's condition.








Results


Result Diagrams: 


                                 11/25/19 04:32





                                 11/25/19 04:32


All other labs normal.








Assessment and Plan


Assessment and plan: 


Impression: Per the record, the patient was trying to attempt suicide by jumping

from a bridge, but ended up falling down an embankment. The patient is 

intubated.





Recommendation/Plan: Continue 1013. Reconsult psy once the patient is extubated.





Staffed with Dr KIET Henson.

## 2019-11-27 RX ADMIN — IPRATROPIUM BROMIDE AND ALBUTEROL SULFATE SCH AMPUL: .5; 3 SOLUTION RESPIRATORY (INHALATION) at 15:15

## 2019-11-27 RX ADMIN — METHYLPREDNISOLONE SODIUM SUCCINATE SCH MG: 125 INJECTION, POWDER, FOR SOLUTION INTRAMUSCULAR; INTRAVENOUS at 05:43

## 2019-11-27 RX ADMIN — IPRATROPIUM BROMIDE AND ALBUTEROL SULFATE SCH: .5; 3 SOLUTION RESPIRATORY (INHALATION) at 20:30

## 2019-11-27 RX ADMIN — Medication SCH ML: at 23:20

## 2019-11-27 RX ADMIN — METHYLPREDNISOLONE SODIUM SUCCINATE SCH MG: 125 INJECTION, POWDER, FOR SOLUTION INTRAMUSCULAR; INTRAVENOUS at 16:47

## 2019-11-27 RX ADMIN — IPRATROPIUM BROMIDE AND ALBUTEROL SULFATE SCH AMPUL: .5; 3 SOLUTION RESPIRATORY (INHALATION) at 07:34

## 2019-11-27 RX ADMIN — HEPARIN SODIUM SCH UNIT: 5000 INJECTION, SOLUTION INTRAVENOUS; SUBCUTANEOUS at 10:54

## 2019-11-27 RX ADMIN — METHYLPREDNISOLONE SODIUM SUCCINATE SCH MG: 125 INJECTION, POWDER, FOR SOLUTION INTRAMUSCULAR; INTRAVENOUS at 23:13

## 2019-11-27 RX ADMIN — DEXMEDETOMIDINE SCH MLS/HR: 100 INJECTION, SOLUTION INTRAVENOUS at 11:38

## 2019-11-27 RX ADMIN — Medication SCH ML: at 10:55

## 2019-11-27 RX ADMIN — IPRATROPIUM BROMIDE AND ALBUTEROL SULFATE SCH AMPUL: .5; 3 SOLUTION RESPIRATORY (INHALATION) at 11:40

## 2019-11-27 RX ADMIN — HEPARIN SODIUM SCH UNIT: 5000 INJECTION, SOLUTION INTRAVENOUS; SUBCUTANEOUS at 23:20

## 2019-11-27 RX ADMIN — FAMOTIDINE SCH MG: 10 INJECTION, SOLUTION INTRAVENOUS at 23:21

## 2019-11-27 RX ADMIN — FAMOTIDINE SCH MG: 10 INJECTION, SOLUTION INTRAVENOUS at 10:55

## 2019-11-27 NOTE — XRAY REPORT
CHEST 1 VIEW, 11/27/2019 2:13 AM 



CLINICAL INFORMATION/INDICATION: Respiratory failure



COMPARISON: Chest radiograph, 11/26/2019 at 2:29 AM



FINDINGS:



SUPPORT DEVICES: Endotracheal tube and esophagogastric tube project in similar position.

HEART: The cardiac silhouette remains normal in size.

LUNGS/PLEURA: Diffuse bullous changes are noted throughout both lungs. No superimposed airspace conso
lidation or large pleural effusion is identified.

ADDITIONAL FINDINGS: No additional acute findings.



IMPRESSION:

1. Stable appearance of the chest.



Signer Name: Jemma Hurley MD 

Signed: 11/27/2019 4:28 AM

 Workstation Name: [id number]

## 2019-11-27 NOTE — PROGRESS NOTE
Assessment and Plan





- Patient Problems


(1) Altered mental status


Current Visit: Yes   Status: Acute   


Qualifiers: 


   Altered mental status type: delirium   Qualified Code(s): R41.0 - 

Disorientation, unspecified   





(2) Cocaine abuse


Current Visit: Yes   Status: Acute   





(3) Fall


Current Visit: Yes   Status: Acute   


Qualifiers: 


   Encounter type: initial encounter   Qualified Code(s): W19.XXXA - Unspecified

fall, initial encounter   





(4) Respiratory failure


Current Visit: Yes   Status: Acute   


Qualifiers: 


   Chronicity: acute   Respiratory failure complication: unspecified whether 

with hypoxia or hypercapnia   Qualified Code(s): J96.00 - Acute respiratory 

failure, unspecified whether with hypoxia or hypercapnia   





(5) Suicidal ideation


Current Visit: Yes   Status: Acute   





(6) Bronchospasm


Current Visit: No   Status: Acute   





(7) Nicotine dependence unspecified, with withdrawal


Current Visit: No   Status: Acute   


Qualifiers: 


   Nicotine product type: cigarettes   Qualified Code(s): F17.213 - Nicotine 

dependence, cigarettes, with withdrawal   





(8) Polysubstance abuse


Current Visit: No   Status: Acute   





(9) Transaminitis


Current Visit: No   Status: Acute   





Subjective


Principal diagnosis: Acute respiratory failure


Interval history: 





on vent during rounds


Pt vocalizing around deflated cuff





Objective


                               Vital Signs - 12hr











  11/27/19 11/27/19 11/27/19





  01:00 01:30 02:00


 


Temperature   


 


Pulse Rate 72 71 78


 


Pulse Rate [   





Bilateral   





Throughout]   


 


Pulse Rate [   





From Monitor]   


 


Respiratory 19 20 21





Rate   


 


Respiratory   





Rate [Bilateral   





Throughout]   


 


Blood Pressure 105/51 97/50 103/61


 


O2 Sat by Pulse 98 97 98





Oximetry   














  11/27/19 11/27/19 11/27/19





  02:30 03:00 03:30


 


Temperature   


 


Pulse Rate 83 81 72


 


Pulse Rate [   





Bilateral   





Throughout]   


 


Pulse Rate [   





From Monitor]   


 


Respiratory 20 20 20





Rate   


 


Respiratory   





Rate [Bilateral   





Throughout]   


 


Blood Pressure 100/51 97/48 98/48


 


O2 Sat by Pulse 97 97 97





Oximetry   














  11/27/19 11/27/19 11/27/19





  04:00 04:01 04:31


 


Temperature 98.5 F  


 


Pulse Rate  81 113 H


 


Pulse Rate [   





Bilateral   





Throughout]   


 


Pulse Rate [ 93 H  





From Monitor]   


 


Respiratory 18 17 23





Rate   


 


Respiratory   





Rate [Bilateral   





Throughout]   


 


Blood Pressure  115/70 115/70


 


O2 Sat by Pulse 97 99 99





Oximetry   














  11/27/19 11/27/19 11/27/19





  04:50 05:00 05:30


 


Temperature   


 


Pulse Rate 99 H 97 H 84


 


Pulse Rate [   





Bilateral   





Throughout]   


 


Pulse Rate [   





From Monitor]   


 


Respiratory  20 20





Rate   


 


Respiratory   





Rate [Bilateral   





Throughout]   


 


Blood Pressure 119/52 112/53 113/54


 


O2 Sat by Pulse 96 97 97





Oximetry   














  11/27/19 11/27/19 11/27/19





  06:00 06:30 07:00


 


Temperature   


 


Pulse Rate 71 70 64


 


Pulse Rate [   





Bilateral   





Throughout]   


 


Pulse Rate [   





From Monitor]   


 


Respiratory 20 20 17





Rate   


 


Respiratory   





Rate [Bilateral   





Throughout]   


 


Blood Pressure 108/51 105/53 99/54


 


O2 Sat by Pulse 98 98 100





Oximetry   














  11/27/19 11/27/19 11/27/19





  07:30 07:34 07:35


 


Temperature   


 


Pulse Rate 65  71


 


Pulse Rate [  74 





Bilateral   





Throughout]   


 


Pulse Rate [   





From Monitor]   


 


Respiratory 17  





Rate   


 


Respiratory  20 





Rate [Bilateral   





Throughout]   


 


Blood Pressure 97/54  108/51


 


O2 Sat by Pulse 100  98





Oximetry   














  11/27/19 11/27/19 11/27/19





  08:00 08:01 08:30


 


Temperature 99.3 F  


 


Pulse Rate  98 H 87


 


Pulse Rate [   





Bilateral   





Throughout]   


 


Pulse Rate [ 98 H  





From Monitor]   


 


Respiratory 25 H 25 H 20





Rate   


 


Respiratory   





Rate [Bilateral   





Throughout]   


 


Blood Pressure  117/99 98/54


 


O2 Sat by Pulse 98 98 98





Oximetry   














  11/27/19 11/27/19 11/27/19





  11:41 11:52 12:08


 


Temperature   


 


Pulse Rate  87 


 


Pulse Rate [ 84  





Bilateral   





Throughout]   


 


Pulse Rate [   





From Monitor]   


 


Respiratory   





Rate   


 


Respiratory 23  





Rate [Bilateral   





Throughout]   


 


Blood Pressure  98/54 


 


O2 Sat by Pulse  98 98





Oximetry   











Constitutional: other (intubated, sedated, critically ill on ventilator)


Eyes: non-icteric


ENT: oropharynx moist


Neck: supple


Effort: normal


Ascultation: Bilateral: clear, wheezes (w/ prolonged expiratory phase)


Cardiovascular: regular rate and rhythm (no mrg)


Gastrointestinal: normoactive bowel sounds, soft, non-tender, non-distended


Integumentary: normal


Extremities: no cyanosis, no edema, pink and warm


Neurologic: other (Cannot obtain as intubated/sedated)


Psychiatric: other (Cannot obtain as intubated/sedated)


CBC and BMP: 


                                 11/25/19 04:32





                                 11/25/19 04:32


ABG, PT/INR, D-dimer: 


ABG











POC ABG pH  7.418  (7.35-7.45)   11/27/19  05:00    


 


POC ABG pCO2  36.8  (35-45)   11/27/19  05:00    


 


POC ABG pO2  96  ()   11/27/19  05:00    


 


POC ABG HCO3  23.8  (22-26 mml/L)   11/27/19  05:00    


 


POC ABG Total CO2  25  (23-27mmol/L)   11/27/19  05:00    


 


POC ABG O2 Sat  98   11/27/19  05:00    





PT/INR, D-dimer











PT  30.5 Sec. (12.2-14.9)  H  11/24/19  11:10    


 


INR  3.00  (0.87-1.13)  H  11/24/19  11:10    








Abnormal lab findings: 


                                  Abnormal Labs











  11/24/19 11/24/19 11/24/19





  11:10 11:10 11:10


 


WBC   


 


MCV  99 H  


 


MCH  34 H  


 


RDW  13.1 L  


 


Lymph % (Auto)   


 


Mono % (Auto)  12.1 H  


 


Baso % (Auto)  1.9 H  


 


Lymph #   


 


Mono #  1.2 H  


 


Baso #  0.2 H  


 


Seg Neutrophils %   


 


PT   30.5 H 


 


INR   3.00 H 


 


POC ABG pH   


 


POC ABG pCO2   


 


POC ABG pO2   


 


Chloride   


 


Carbon Dioxide    18 L


 


Glucose    103 H


 


AST    58 H


 


Total Creatine Kinase    215 H


 


CK-MB (CK-2)    4.5 H


 


Albumin    3.1 L


 


Amylase   


 


Lipase    8 L


 


Salicylates   


 


Acetaminophen   














  11/24/19 11/24/19 11/24/19





  11:10 11:10 11:33


 


WBC   


 


MCV   


 


MCH   


 


RDW   


 


Lymph % (Auto)   


 


Mono % (Auto)   


 


Baso % (Auto)   


 


Lymph #   


 


Mono #   


 


Baso #   


 


Seg Neutrophils %   


 


PT   


 


INR   


 


POC ABG pH    7.291 L


 


POC ABG pCO2    49.7 H


 


POC ABG pO2    234 H


 


Chloride   


 


Carbon Dioxide   


 


Glucose   


 


AST   


 


Total Creatine Kinase   


 


CK-MB (CK-2)   


 


Albumin   


 


Amylase   


 


Lipase   


 


Salicylates  < 0.3 L  


 


Acetaminophen   < 5.0 L 














  11/24/19 11/25/19 11/25/19





  19:30 04:18 04:32


 


WBC    4.0 L


 


MCV    100 H


 


MCH    34 H


 


RDW   


 


Lymph % (Auto)    9.6 L


 


Mono % (Auto)   


 


Baso % (Auto)   


 


Lymph #    0.4 L


 


Mono #   


 


Baso #   


 


Seg Neutrophils %    86.4 H


 


PT   


 


INR   


 


POC ABG pH   7.348 L 


 


POC ABG pCO2   


 


POC ABG pO2   108 H 


 


Chloride   


 


Carbon Dioxide   


 


Glucose   


 


AST   


 


Total Creatine Kinase   


 


CK-MB (CK-2)   


 


Albumin  3.2 L  


 


Amylase  438 H  


 


Lipase   


 


Salicylates   


 


Acetaminophen   














  11/25/19





  04:32


 


WBC 


 


MCV 


 


MCH 


 


RDW 


 


Lymph % (Auto) 


 


Mono % (Auto) 


 


Baso % (Auto) 


 


Lymph # 


 


Mono # 


 


Baso # 


 


Seg Neutrophils % 


 


PT 


 


INR 


 


POC ABG pH 


 


POC ABG pCO2 


 


POC ABG pO2 


 


Chloride  107.5 H


 


Carbon Dioxide 


 


Glucose  118 H


 


AST  73 H


 


Total Creatine Kinase 


 


CK-MB (CK-2) 


 


Albumin  3.2 L


 


Amylase 


 


Lipase 


 


Salicylates 


 


Acetaminophen

## 2019-11-27 NOTE — PROGRESS NOTE
Assessment and Plan


Assessment and plan: 


patient 48 year old with a history of emphysema, crack cocaine abuse and tobacco

abuse presents after being [place] bridge with a failed attempt at suicide.  

Patient was threatening to jump off a bridge he ended up falling down 

approximately 4 foot embankment.  Patient was brought to the ED was found to be 

in acute distress with agonal breathing.  Patient required intubation because of

acute hypoxemia.   Patient will require psych consult and 1013 after he wakes up

and is more stable and responsive.








CT Cervical Spine: IMPRESSION: 1. No indication of fracture or traumatic ellis

bluxation. 2. Increased soft tissue fullness in the prevertebral and 

retropharyngeal region may represent retained secretions secondary to 

nasotracheal intubation, however, follow-up study is suggested to exclude other 

retropharyngeal or prevertebral pathology such as mass or hematoma. 3. Extensive

biapical bullous changes in both lung apices. 


chest xray: IMPRESSION: 1. Stable appearance of the chest. No acute Pathology


Bilateral Tib/fiB XRAY: IMPRESSION: No acute abnormality of either tibia/fibula.




CT A/P IMPRESSION: 1. Limited noncontrast exam demonstrating no acute 

abnormality of the abdomen or pelvis. 2. Additional findings as above. 


CT HEAD/BRAIN: IMPRESSION: Cranial CT scan within normal limits. Mild sinus 

disease, likely chronic.








- Patient Problems


(1) COPD exacerbation


Current Visit: No   Status: Acute   


Plan to address problem: 


Patient has wheezing and emphysema.  Chest x-ray consistent with emphysema.  

Most likely exacerbated by suicide attempt and crack abuse.


Pulmonary following





Tobacco use disorder- will provide counselling when awake





(2) Respiratory failure on mechanical ventilation for >48 hrs


Current Visit: Yes   Status: Acute   


Qualifiers: 


   Chronicity: acute   Respiratory failure complication: unspecified whether 

with hypoxia or hypercapnia   Qualified Code(s): J96.00 - Acute respiratory 

failure, unspecified whether with hypoxia or hypercapnia   


Plan to address problem: 


Respiratory failure I reviewed all CT scans head chest abdomen pelvis chest x-

ray.   The to help with underlying condition and workup.


Pulmonary following the patient


?Pharyngeal swelling, will discuss with Pulmonary about repeat imaging studies 

to better evaluate


VAP bundle


aspiration precautions


Pressure ulcer prevention method





(3) Cocaine abuse


Current Visit: Yes   Status: Acute   


Plan to address problem: 


Patient is no longer tachycardic or shows any evidence of crack overdose of 

cocaine overdose at this particular time.  Remains intubated however.





(4) Suicidal ideation


Current Visit: Yes   Status: Acute   


Plan to address problem: 


Patient with suicide attempt.   1013 


Mental health consult placed








(5)Acute metabolic Encephalopathy


Unchanged, POA,. likely secondary to Cocain toxicity and hypoxIa,


IF persist, will obtain Neurology eval in am





(6) Severe Protien Malnutrition


Current Visit: Yes   Status: Acute   


Plan to address problem: 


Most likely secondary to drug abuse patient remains cachectic.  We'll encourage 

boost once patient is extubated.











The high probability of a clinically significant, sudden or life threatening 

deterioration of the [Pulmonary, Mental] system(s) required my full and direct 

attention, intervention and personal management. The aggregate critical care 

time was [35] minutes. This time is in addition to time spent performing 

reported procedures but includes the following: 





[x] Data Review and interpretation 





[x]x Patient assessment and monitoring of vital signs 





[x] Documentation 





[x] Medication orders and management


Total Time Spent with Patient (Minutes): 35





History


Interval history: 


Patient seen and examined, no family at bedside. Per information reviewed, 

patient previous accepted in a rehab facility last year but did not go. He 

remains on the vent, still confused, and agitated when off sedative.


No clinical change, no adverse event reported overnight





Hospitalist Physical





- Physical exam


Narrative exam: 


General appearance: Present: severe distress, cachectic, disheveled, other 

(intubated sedation.), AGITATED WHEN WEANING FROM SEDATION





- EENT


Eyes: Present: PERRL


ENT: poor dentition, other (ETT in place)





- Neck


Neck: Present: supple, normal ROM





- Respiratory


Respiratory effort: normal


Respiratory: bilateral: CTA (bronchivesicular)





- Cardiovascular


Rhythm: regular


Heart Sounds: Present: S1 & S2.  Absent: systolic murmur





- Extremities


Extremities: no ischemia, pulses intact, pulses symmetrical, No edema, normal 

temperature, normal color, Full ROM


Peripheral Pulses: within normal limits





- Abdominal


General gastrointestinal: soft, non-tender (unable to assess due to mental 

status), non-distended, normal bowel sounds





- Integumentary


Integumentary: Present: warm





- Psychiatric


Psychiatric: other (unable to assess due to mentall status)





- Neurologic


Neurologic: other





- Allied Health


Allied health notes reviewed: nursing











- Constitutional


Vitals: 


                                        











Temp Pulse Resp BP Pulse Ox


 


 98.5 F   71   20   108/51   98 


 


 11/27/19 04:00  11/27/19 07:35  11/27/19 07:34  11/27/19 07:35  11/27/19 07:35











General appearance: Present: severe distress, cachectic, disheveled, other 

(intubated sedation.)





Results





- Labs


CBC & Chem 7: 


                                 11/25/19 04:32





                                 11/25/19 04:32


Labs: 


                             Laboratory Last Values











WBC  4.0 K/mm3 (4.5-11.0)  L  11/25/19  04:32    


 


RBC  4.32 M/mm3 (3.65-5.03)   11/25/19  04:32    


 


Hgb  14.5 gm/dl (11.8-15.2)   11/25/19  04:32    


 


Hct  43.3 % (35.5-45.6)   11/25/19  04:32    


 


MCV  100 fl (84-94)  H  11/25/19  04:32    


 


MCH  34 pg (28-32)  H  11/25/19  04:32    


 


MCHC  33 % (32-34)   11/25/19  04:32    


 


RDW  13.4 % (13.2-15.2)   11/25/19  04:32    


 


Plt Count  371 K/mm3 (140-440)   11/25/19  04:32    


 


Lymph % (Auto)  9.6 % (13.4-35.0)  L  11/25/19  04:32    


 


Mono % (Auto)  3.4 % (0.0-7.3)   11/25/19  04:32    


 


Eos % (Auto)  0.0 % (0.0-4.3)   11/25/19  04:32    


 


Baso % (Auto)  0.6 % (0.0-1.8)   11/25/19  04:32    


 


Lymph #  0.4 K/mm3 (1.2-5.4)  L  11/25/19  04:32    


 


Mono #  0.1 K/mm3 (0.0-0.8)   11/25/19  04:32    


 


Eos #  0.0 K/mm3 (0.0-0.4)   11/25/19  04:32    


 


Baso #  0.0 K/mm3 (0.0-0.1)   11/25/19  04:32    


 


Seg Neutrophils %  86.4 % (40.0-70.0)  H  11/25/19  04:32    


 


Seg Neutrophils #  3.5 K/mm3 (1.8-7.7)   11/25/19  04:32    


 


PT  30.5 Sec. (12.2-14.9)  H  11/24/19  11:10    


 


INR  3.00  (0.87-1.13)  H  11/24/19  11:10    


 


APTT  29.7 Sec. (24.2-36.6)   11/24/19  11:10    


 


POC ABG pH  7.418  (7.35-7.45)   11/27/19  05:00    


 


POC ABG pCO2  36.8  (35-45)   11/27/19  05:00    


 


POC ABG pO2  96  ()   11/27/19  05:00    


 


POC ABG HCO3  23.8  (22-26 mml/L)   11/27/19  05:00    


 


POC ABG Total CO2  25  (23-27mmol/L)   11/27/19  05:00    


 


POC ABG O2 Sat  98   11/27/19  05:00    


 


POC ABG Base Excess  -1  ((-2) - (+3)mmol/L)   11/27/19  05:00    


 


FiO2  30 %  11/27/19  05:00    


 


Sodium  141 mmol/L (137-145)   11/25/19  04:32    


 


Potassium  4.8 mmol/L (3.6-5.0)  D 11/25/19  04:32    


 


Chloride  107.5 mmol/L ()  H  11/25/19  04:32    


 


Carbon Dioxide  23 mmol/L (22-30)   11/25/19  04:32    


 


Anion Gap  15 mmol/L  11/25/19  04:32    


 


BUN  10 mg/dL (9-20)   11/25/19  04:32    


 


Creatinine  0.9 mg/dL (0.8-1.5)   11/25/19  04:32    


 


Estimated GFR  > 60 ml/min  11/25/19  04:32    


 


BUN/Creatinine Ratio  11 %  11/25/19  04:32    


 


Glucose  118 mg/dL ()  H  11/25/19  04:32    


 


Hemoglobin A1c  5.3 % (4-6)   11/24/19  11:10    


 


Calcium  9.4 mg/dL (8.4-10.2)   11/25/19  04:32    


 


Phosphorus  2.90 mg/dL (2.5-4.5)   11/24/19  19:30    


 


Magnesium  1.70 mg/dL (1.7-2.3)   11/24/19  19:30    


 


Total Bilirubin  0.40 mg/dL (0.1-1.2)   11/25/19  04:32    


 


Direct Bilirubin  < 0.2 mg/dL (0-0.2)   11/24/19  11:10    


 


Indirect Bilirubin  0.2 mg/dL  11/24/19  11:10    


 


AST  73 units/L (5-40)  H  11/25/19  04:32    


 


ALT  56 units/L (7-56)   11/25/19  04:32    


 


Alkaline Phosphatase  82 units/L ()   11/25/19  04:32    


 


Ammonia  29.0 umol/L (25-60)   11/24/19  15:02    


 


Total Creatine Kinase  215 units/L ()  H  11/24/19  11:10    


 


CK-MB (CK-2)  4.5 ng/mL (0.0-4.0)  H  11/24/19  11:10    


 


CK-MB (CK-2) Rel Index  2.0  (0-4)   11/24/19  11:10    


 


Troponin T  < 0.010 ng/mL (0.00-0.029)   11/24/19  11:10    


 


NT-Pro-B Natriuret Pep  33.88 pg/mL (0-450)   11/24/19  11:10    


 


Total Protein  7.4 g/dL (6.3-8.2)   11/25/19  04:32    


 


Albumin  3.2 g/dL (3.9-5)  L  11/25/19  04:32    


 


Albumin/Globulin Ratio  0.8 %  11/25/19  04:32    


 


Amylase  438 units/L ()  H  11/24/19  19:30    


 


Lipase  8 units/L (13-60)  L  11/24/19  11:10    


 


Urine Color  Yellow  (Yellow)   11/24/19  11:59    


 


Urine Turbidity  Clear  (Clear)   11/24/19  11:59    


 


Urine pH  6.0  (5.0-7.0)   11/24/19  11:59    


 


Ur Specific Gravity  1.020  (1.003-1.030)   11/24/19  11:59    


 


Urine Protein  <15 mg/dl mg/dL (Negative)   11/24/19  11:59    


 


Urine Glucose (UA)  Neg mg/dL (Negative)   11/24/19  11:59    


 


Urine Ketones  Tr mg/dL (Negative)   11/24/19  11:59    


 


Urine Blood  Sm  (Negative)   11/24/19  11:59    


 


Urine Nitrite  Neg  (Negative)   11/24/19  11:59    


 


Urine Bilirubin  Neg  (Negative)   11/24/19  11:59    


 


Urine Urobilinogen  4.0 mg/dL (<2.0)   11/24/19  11:59    


 


Ur Leukocyte Esterase  Neg  (Negative)   11/24/19  11:59    


 


Urine WBC (Auto)  3.0 /HPF (0.0-6.0)   11/24/19  11:59    


 


Urine RBC (Auto)  25.0 /HPF (0.0-6.0)   11/24/19  11:59    


 


U Epithel Cells (Auto)  1.0 /HPF (0-13.0)   11/24/19  11:59    


 


Calcium Oxalate Crystal  Few   11/24/19  11:59    


 


Urine Mucus  Few /HPF  11/24/19  11:59    


 


Salicylates  < 0.3 mg/dL (2.8-20.0)  L  11/24/19  11:10    


 


Urine Opiates Screen  Presumptive negative   11/24/19  09:57    


 


Urine Methadone Screen  Presumptive negative   11/24/19  09:57    


 


Acetaminophen  < 5.0 ug/mL (10.0-30.0)  L  11/24/19  11:10    


 


Ur Barbiturates Screen  Presumptive negative   11/24/19  09:57    


 


Ur Phencyclidine Scrn  Presumptive negative   11/24/19  09:57    


 


Ur Amphetamines Screen  Presumptive negative   11/24/19  09:57    


 


U Benzodiazepines Scrn  Presumptive positive   11/24/19  09:57    


 


Urine Cocaine Screen  Presumptive positive   11/24/19  09:57    


 


U Marijuana (THC) Screen  Presumptive negative   11/24/19  09:57    


 


Drugs of Abuse Note  Disclamer   11/24/19  09:57    


 


Plasma/Serum Alcohol  < 0.01 % (0-0.07)   11/24/19  11:10    


 


Blood Type  O POSITIVE   11/24/19  10:05    


 


Antibody Screen  Negative   11/24/19  10:05    














Active Medications





- Current Medications


Current Medications: 














Generic Name Dose Route Start Last Admin





  Trade Name Freq  PRN Reason Stop Dose Admin


 


Acetaminophen  650 mg  11/24/19 19:14 





  Tylenol  PO  





  Q4H PRN  





  Pain MILD(1-3)/Fever >100.5/HA  


 


Albuterol  2.5 mg  11/24/19 19:53 





  Proventil  IH  





  Q4HRT PRN  





  Shortness Of Breath  


 


Albuterol/Ipratropium  1 ampul  11/24/19 20:00  11/27/19 07:34





  Duoneb *Not For Prn Use*  IH   1 ampul





  QIDRT MARIE   Administration


 


Lipase/Protease/Amylase  1 each  11/24/19 19:28 





  Pancreanya Holder 10,500 Unit  FEEDTUBE  





  PRN PRN  





  For Clogged Feeding Tube  


 


Clonazepam  0.5 mg  11/26/19 22:00  11/26/19 22:53





  Klonopin  PO   0.5 mg





  BID MARIE   Administration


 


Famotidine  20 mg  11/24/19 22:00  11/26/19 22:55





  Pepcid  IV   20 mg





  BID MARIE   Administration


 


Heparin Sodium (Porcine)  5,000 unit  11/25/19 10:00  11/26/19 22:54





  Heparin  SUB-Q   5,000 unit





  Q12HR MAIRE   Administration


 


Hydrophilic Ointment  1 applic  11/24/19 09:47 





  Vaseline Lip Therapy  TP  





  Q2HR PRN  





  Dry Lips  


 


Levofloxacin/Dextrose  750 mg in 150 mls @ 100 mls/hr  11/24/19 20:00  11/26/19 

22:54





  Levaquin 750mg/150ml  IV   100 mls/hr





  Q24H MARIE   Administration





  Protocol  


 


Dexmedetomidine HCl 200 mcg/  50 mls @ 2.965 mls/hr  11/26/19 13:00  11/26/19 

14:42





  Sodium Chloride  IV   0.3 mcg/kg/hr





  TITRATE MARIE   4.448 mls/hr





    Titration





  Protocol  





  0.2 MCG/KG/HR  


 


Lorazepam  2 mg  11/24/19 19:30  11/27/19 08:08





  Ativan  IV   2 mg





  Q1H PRN   Administration





  CIWA-Ar 8-15  


 


Lorazepam  4 mg  11/24/19 19:30  11/27/19 04:13





  Ativan  IV   4 mg





  Q1H PRN   Administration





  CIWA-Ar 16-25  


 


Methylprednisolone Sodium Succinate  40 mg  11/24/19 22:00  11/27/19 05:43





  Solu-Medrol  IV   40 mg





  Q8HR MARIE   Administration


 


Multi-Ingred Cream/Lotion/Oil/Oint  1 applic  11/24/19 09:47 





  Artificial Tears Ophth Oint  OU  





  Q4HR PRN  





  Dry Eye(s)  


 


Ondansetron HCl  4 mg  11/24/19 19:14 





  Zofran  IV  





  Q8H PRN  





  Nausea And Vomiting  


 


Simple Syrup  15 ml  11/24/19 19:28 





  Simple Syrup  FEEDTUBE  





  PRN PRN  





  Hypoglycemia  


 


Simple Syrup  30 ml  11/24/19 19:28 





  Simple Syrup  FEEDTUBE  





  PRN PRN  





  Hypoglycemia  


 


Sodium Bicarbonate  325 mg  11/24/19 19:28 





  Sodium Bicarbonate  FEEDTUBE  





  PRN PRN  





  For Clogged Feeding Tube  


 


Sodium Chloride  10 ml  11/24/19 22:00  11/26/19 10:25





  Sodium Chloride Flush Syringe 10 Ml  IV   10 ml





  BID MARIE   Administration


 


Sodium Chloride  10 ml  11/24/19 19:14 





  Sodium Chloride Flush Syringe 10 Ml  IV  





  PRN PRN  





  LINE FLUSH  














Nutrition/Malnutrition Assess





- Dietary Evaluation


Nutrition/Malnutrition Findings: 


                                        





Nutrition Notes                                            Start:  11/25/19 

11:28


Freq:                                                      Status: Active       




Protocol:                                                                       




 Document     11/25/19 11:28  LP  (Rec: 11/25/19 11:34  LP  [id number])


 Nutrition Notes


     Need for Assessment generated from:         MD Order


     Initial or Follow up                        Assessment


     Current Diagnosis                           COPD,Respiratory Failure,


                                                 Malnutrition


     Other Pertinent Diagnosis                   cocaine abuse, fall


     Current Diet                                NPO


     Labs/Tests                                  Amylase 438


     Pertinent Medications                       D5 NS at 100ml/hr


                                                 Solumedrol


                                                 Propofol 4.3ml/hr


     Height                                      5 ft 8 in


     Weight                                      59.3 kg


     Ideal Body Weight (kg)                      70.00


     BMI                                         19.8


     Subjective/Other Information                Consult for TF. Pt on vent and


                                                 observed fat and muscle loss.


     Burn                                        Absent


     Trauma                                      Absent


     Current % PO                                Negligible


     Minimum of two criteria                     Yes


     Body Fat Depletion                          Mild depletion (non-severe)


     Muscle Mass                                 Mild Depletion (non-severe)


     #1


      Nutrition Diagnosis                        Malnutrition


      Etiology                                   cocaine abuse


      As Evidenced by Signs and Symptoms         Pt noted with muscle and fat


                                                 loss


     Is patient on ventilator?                   Yes


     Is Patient Ambulatory and/or Out of Bed     No


     [facility]-([facility]-confined to bed)      2413.294


     Calculation Used for Recommendations        [facility]


     Additional Notes                            Protein needs are 71-119g (1.2


                                                 -2g/kg)


                                                 Fluid needs are 1ml/kcal


 Nutrition Intervention


     Change Diet Order:                          TF


     Nutrition Support:                          Vital 1.2 at 60ml/hr


                                                 Water flush 100ml q4h


     Kcal                                        1,728


     Protein (gm)                                109


     Fluid (mL)                                  1,163


     Goal #1                                     Meet at least 80% of kcal and


                                                 protein needs via TF


     Anticipated Discharge Needs:                Unable to determine at this


                                                 time


     Follow-Up By:                               11/27/19


     Additional Comments                         Follow for TF start/tolerance

## 2019-11-28 RX ADMIN — HEPARIN SODIUM SCH UNIT: 5000 INJECTION, SOLUTION INTRAVENOUS; SUBCUTANEOUS at 22:03

## 2019-11-28 RX ADMIN — ARFORMOTEROL TARTRATE SCH: 15 SOLUTION RESPIRATORY (INHALATION) at 13:34

## 2019-11-28 RX ADMIN — Medication SCH ML: at 22:05

## 2019-11-28 RX ADMIN — IPRATROPIUM BROMIDE AND ALBUTEROL SULFATE SCH AMPUL: .5; 3 SOLUTION RESPIRATORY (INHALATION) at 08:00

## 2019-11-28 RX ADMIN — ARFORMOTEROL TARTRATE SCH: 15 SOLUTION RESPIRATORY (INHALATION) at 19:43

## 2019-11-28 RX ADMIN — HEPARIN SODIUM SCH UNIT: 5000 INJECTION, SOLUTION INTRAVENOUS; SUBCUTANEOUS at 10:55

## 2019-11-28 RX ADMIN — FAMOTIDINE SCH MG: 20 TABLET ORAL at 22:04

## 2019-11-28 RX ADMIN — METHYLPREDNISOLONE SODIUM SUCCINATE SCH: 125 INJECTION, POWDER, FOR SOLUTION INTRAMUSCULAR; INTRAVENOUS at 15:57

## 2019-11-28 RX ADMIN — BUDESONIDE SCH: 0.25 INHALANT RESPIRATORY (INHALATION) at 19:44

## 2019-11-28 RX ADMIN — Medication SCH ML: at 11:37

## 2019-11-28 RX ADMIN — IPRATROPIUM BROMIDE AND ALBUTEROL SULFATE SCH: .5; 3 SOLUTION RESPIRATORY (INHALATION) at 13:35

## 2019-11-28 RX ADMIN — FAMOTIDINE SCH MG: 20 TABLET ORAL at 10:55

## 2019-11-28 RX ADMIN — Medication SCH ML: at 04:20

## 2019-11-28 RX ADMIN — METHYLPREDNISOLONE SODIUM SUCCINATE SCH MG: 125 INJECTION, POWDER, FOR SOLUTION INTRAMUSCULAR; INTRAVENOUS at 22:05

## 2019-11-28 RX ADMIN — NICOTINE SCH MG: 7 PATCH TRANSDERMAL at 11:34

## 2019-11-28 RX ADMIN — METHYLPREDNISOLONE SODIUM SUCCINATE SCH MG: 125 INJECTION, POWDER, FOR SOLUTION INTRAMUSCULAR; INTRAVENOUS at 07:06

## 2019-11-28 NOTE — XRAY REPORT
CHEST 1 VIEW, 11/28/2019 2:10 AM 



CLINICAL INFORMATION/INDICATION: Respiratory failure. Follow-up evaluation



COMPARISON: Chest radiograph, 11/27/2019 at 2:13 AM



FINDINGS:



SUPPORT DEVICES: The endotracheal tube and esophagogastric tube have been removed.

HEART: The cardiac silhouette is normal in size.

LUNGS/PLEURA: Diffuse bullous emphysematous changes are again noted. There is no evidence of superimp
osed airspace disease or pleural effusion.

ADDITIONAL FINDINGS: No additional acute findings.



IMPRESSION:

1. Stable appearance of the chest.



Signer Name: Jemma Hurley MD 

Signed: 11/28/2019 4:57 AM

 Workstation Name: [id number]

## 2019-11-28 NOTE — PROGRESS NOTE
Assessment and Plan





- Patient Problems


(1) Altered mental status


Current Visit: Yes   Status: Acute   


Qualifiers: 


   Altered mental status type: delirium   Qualified Code(s): R41.0 - 

Disorientation, unspecified   





(2) Cocaine abuse


Current Visit: Yes   Status: Acute   





(3) Fall


Current Visit: Yes   Status: Acute   


Qualifiers: 


   Encounter type: initial encounter   Qualified Code(s): W19.XXXA - Unspecified

fall, initial encounter   





(4) Respiratory failure


Current Visit: Yes   Status: Acute   


Qualifiers: 


   Chronicity: acute   Respiratory failure complication: unspecified whether 

with hypoxia or hypercapnia   Qualified Code(s): J96.00 - Acute respiratory 

failure, unspecified whether with hypoxia or hypercapnia   





(5) Suicidal ideation


Current Visit: Yes   Status: Acute   





(6) Bronchospasm


Current Visit: No   Status: Acute   





(7) Nicotine dependence unspecified, with withdrawal


Current Visit: No   Status: Acute   


Qualifiers: 


   Nicotine product type: cigarettes   Qualified Code(s): F17.213 - Nicotine 

dependence, cigarettes, with withdrawal   





(8) Polysubstance abuse


Current Visit: No   Status: Acute   





(9) Transaminitis


Current Visit: No   Status: Acute   





Subjective


Principal diagnosis: Acute respiratory failure


Interval history: 





awake and responsive


aware of events


no weakness or paresthesia reported





Objective


                               Vital Signs - 12hr











  11/27/19 11/27/19 11/27/19





  21:00 21:30 22:00


 


Temperature   


 


Pulse Rate 67 69 65


 


Pulse Rate [   





Bilateral   





Throughout]   


 


Pulse Rate [   





From Monitor]   


 


Respiratory 13 14 11 L





Rate   


 


Respiratory   





Rate [Bilateral   





Throughout]   


 


Blood Pressure 146/60 132/58 129/63


 


O2 Sat by Pulse 99 99 100





Oximetry   














  11/27/19 11/27/19 11/27/19





  22:30 23:00 23:30


 


Temperature   


 


Pulse Rate 69 73 87


 


Pulse Rate [   





Bilateral   





Throughout]   


 


Pulse Rate [   





From Monitor]   


 


Respiratory 15 12 20





Rate   


 


Respiratory   





Rate [Bilateral   





Throughout]   


 


Blood Pressure 125/50 125/50 138/68


 


O2 Sat by Pulse 99 99 100





Oximetry   














  11/27/19 11/28/19 11/28/19





  23:45 00:00 00:30


 


Temperature 97.7 F 97.7 F 


 


Pulse Rate  67 92 H


 


Pulse Rate [   





Bilateral   





Throughout]   


 


Pulse Rate [  67 





From Monitor]   


 


Respiratory  15 19





Rate   


 


Respiratory   





Rate [Bilateral   





Throughout]   


 


Blood Pressure  136/61 136/61


 


O2 Sat by Pulse  99 98





Oximetry   














  11/28/19 11/28/19 11/28/19





  01:00 01:30 02:00


 


Temperature   


 


Pulse Rate 70 71 67


 


Pulse Rate [   





Bilateral   





Throughout]   


 


Pulse Rate [   





From Monitor]   


 


Respiratory 17 16 17





Rate   


 


Respiratory   





Rate [Bilateral   





Throughout]   


 


Blood Pressure 127/54 126/52 134/57


 


O2 Sat by Pulse 100 99 97





Oximetry   














  11/28/19 11/28/19 11/28/19





  02:30 03:00 03:30


 


Temperature   


 


Pulse Rate 74 71 72


 


Pulse Rate [   





Bilateral   





Throughout]   


 


Pulse Rate [   





From Monitor]   


 


Respiratory 13 24 21





Rate   


 


Respiratory   





Rate [Bilateral   





Throughout]   


 


Blood Pressure 124/58 127/55 120/48


 


O2 Sat by Pulse 98 99 98





Oximetry   














  11/28/19 11/28/19 11/28/19





  04:00 04:30 05:00


 


Temperature 98.1 F  


 


Pulse Rate 78 86 64


 


Pulse Rate [   





Bilateral   





Throughout]   


 


Pulse Rate [ 78  





From Monitor]   


 


Respiratory 15 13 14





Rate   


 


Respiratory   





Rate [Bilateral   





Throughout]   


 


Blood Pressure 124/51 115/47 116/65


 


O2 Sat by Pulse 100  





Oximetry   














  11/28/19 11/28/19 11/28/19





  05:30 05:39 06:00


 


Temperature  98.1 F 


 


Pulse Rate 78  65


 


Pulse Rate [   





Bilateral   





Throughout]   


 


Pulse Rate [   





From Monitor]   


 


Respiratory 13  13





Rate   


 


Respiratory   





Rate [Bilateral   





Throughout]   


 


Blood Pressure 116/65  110/54


 


O2 Sat by Pulse   





Oximetry   














  11/28/19 11/28/19 11/28/19





  06:30 07:00 07:30


 


Temperature   


 


Pulse Rate 68 95 H 74


 


Pulse Rate [   





Bilateral   





Throughout]   


 


Pulse Rate [   





From Monitor]   


 


Respiratory 13 11 L 29 H





Rate   


 


Respiratory   





Rate [Bilateral   





Throughout]   


 


Blood Pressure 106/43 137/87 137/87


 


O2 Sat by Pulse   100





Oximetry   














  11/28/19 11/28/19





  08:00 08:10


 


Temperature 97.9 F 


 


Pulse Rate 68 


 


Pulse Rate [  87





Bilateral  





Throughout]  


 


Pulse Rate [  





From Monitor]  


 


Respiratory 22 





Rate  


 


Respiratory  20





Rate [Bilateral  





Throughout]  


 


Blood Pressure 105/61 


 


O2 Sat by Pulse 99 





Oximetry  











Constitutional: no acute distress, alert


Eyes: non-icteric


ENT: oropharynx moist


Neck: supple


Effort: normal


Ascultation: Bilateral: clear


Cardiovascular: regular rate and rhythm (no mrg)


Gastrointestinal: normoactive bowel sounds, soft, non-tender, non-distended


Integumentary: normal


Extremities: no cyanosis, no edema, pink and warm


Neurologic: other (Cannot obtain as intubated/sedated)


Psychiatric: other (Cannot obtain as intubated/sedated)


CBC and BMP: 


                                 11/25/19 04:32





                                 11/25/19 04:32


ABG, PT/INR, D-dimer: 


ABG











POC ABG pH  7.418  (7.35-7.45)   11/27/19  05:00    


 


POC ABG pCO2  36.8  (35-45)   11/27/19  05:00    


 


POC ABG pO2  96  ()   11/27/19  05:00    


 


POC ABG HCO3  23.8  (22-26 mml/L)   11/27/19  05:00    


 


POC ABG Total CO2  25  (23-27mmol/L)   11/27/19  05:00    


 


POC ABG O2 Sat  98   11/27/19  05:00    





PT/INR, D-dimer











PT  30.5 Sec. (12.2-14.9)  H  11/24/19  11:10    


 


INR  3.00  (0.87-1.13)  H  11/24/19  11:10    








Abnormal lab findings: 


                                  Abnormal Labs











  11/24/19 11/24/19 11/24/19





  11:10 11:10 11:10


 


WBC   


 


MCV  99 H  


 


MCH  34 H  


 


RDW  13.1 L  


 


Lymph % (Auto)   


 


Mono % (Auto)  12.1 H  


 


Baso % (Auto)  1.9 H  


 


Lymph #   


 


Mono #  1.2 H  


 


Baso #  0.2 H  


 


Seg Neutrophils %   


 


PT   30.5 H 


 


INR   3.00 H 


 


POC ABG pH   


 


POC ABG pCO2   


 


POC ABG pO2   


 


Chloride   


 


Carbon Dioxide    18 L


 


Glucose    103 H


 


AST    58 H


 


Total Creatine Kinase    215 H


 


CK-MB (CK-2)    4.5 H


 


Albumin    3.1 L


 


Amylase   


 


Lipase    8 L


 


Salicylates   


 


Acetaminophen   














  11/24/19 11/24/19 11/24/19





  11:10 11:10 11:33


 


WBC   


 


MCV   


 


MCH   


 


RDW   


 


Lymph % (Auto)   


 


Mono % (Auto)   


 


Baso % (Auto)   


 


Lymph #   


 


Mono #   


 


Baso #   


 


Seg Neutrophils %   


 


PT   


 


INR   


 


POC ABG pH    7.291 L


 


POC ABG pCO2    49.7 H


 


POC ABG pO2    234 H


 


Chloride   


 


Carbon Dioxide   


 


Glucose   


 


AST   


 


Total Creatine Kinase   


 


CK-MB (CK-2)   


 


Albumin   


 


Amylase   


 


Lipase   


 


Salicylates  < 0.3 L  


 


Acetaminophen   < 5.0 L 














  11/24/19 11/25/19 11/25/19





  19:30 04:18 04:32


 


WBC    4.0 L


 


MCV    100 H


 


MCH    34 H


 


RDW   


 


Lymph % (Auto)    9.6 L


 


Mono % (Auto)   


 


Baso % (Auto)   


 


Lymph #    0.4 L


 


Mono #   


 


Baso #   


 


Seg Neutrophils %    86.4 H


 


PT   


 


INR   


 


POC ABG pH   7.348 L 


 


POC ABG pCO2   


 


POC ABG pO2   108 H 


 


Chloride   


 


Carbon Dioxide   


 


Glucose   


 


AST   


 


Total Creatine Kinase   


 


CK-MB (CK-2)   


 


Albumin  3.2 L  


 


Amylase  438 H  


 


Lipase   


 


Salicylates   


 


Acetaminophen   














  11/25/19





  04:32


 


WBC 


 


MCV 


 


MCH 


 


RDW 


 


Lymph % (Auto) 


 


Mono % (Auto) 


 


Baso % (Auto) 


 


Lymph # 


 


Mono # 


 


Baso # 


 


Seg Neutrophils % 


 


PT 


 


INR 


 


POC ABG pH 


 


POC ABG pCO2 


 


POC ABG pO2 


 


Chloride  107.5 H


 


Carbon Dioxide 


 


Glucose  118 H


 


AST  73 H


 


Total Creatine Kinase 


 


CK-MB (CK-2) 


 


Albumin  3.2 L


 


Amylase 


 


Lipase 


 


Salicylates 


 


Acetaminophen

## 2019-11-28 NOTE — PROGRESS NOTE
Assessment and Plan


Assessment and plan: 


patient 48 year old with a history of emphysema, crack cocaine abuse and tobacco

abuse presents after being [place] bridge with a failed attempt at suicide.  

Patient was threatening to jump off a bridge he ended up falling down 

approximately 4 foot embankment.  Patient was brought to the ED was found to be 

in acute distress with agonal breathing.  Patient required intubation because of

acute hypoxemia.   Patient will require psych consult and 1013 after he wakes up

and is more stable and responsive.








CT Cervical Spine: IMPRESSION: 1. No indication of fracture or traumatic ellis

bluxation. 2. Increased soft tissue fullness in the prevertebral and 

retropharyngeal region may represent retained secretions secondary to 

nasotracheal intubation, however, follow-up study is suggested to exclude other 

retropharyngeal or prevertebral pathology such as mass or hematoma. 3. Extensive

biapical bullous changes in both lung apices. 


chest xray: IMPRESSION: 1. Stable appearance of the chest. No acute Pathology


Bilateral Tib/fiB XRAY: IMPRESSION: No acute abnormality of either tibia/fibula.




CT A/P IMPRESSION: 1. Limited noncontrast exam demonstrating no acute 

abnormality of the abdomen or pelvis. 2. Additional findings as above. 


CT HEAD/BRAIN: IMPRESSION: Cranial CT scan within normal limits. Mild sinus 

disease, likely chronic.





* Extubated and doing well


* Bilateral upper ext edema improving


* awating pyshc


* still mildy leathegic


* 


- Patient Problems


(1) COPD exacerbation


Current Visit: No   Status: Acute   


Plan to address problem: 


Patient has wheezing and emphysema.  Chest x-ray consistent with emphysema.  

Most likely exacerbated by suicide attempt and crack abuse.


Pulmonary following





Tobacco use disorder- will provide counselling when awake





(2) Respiratory failure on mechanical ventilation for >48 hrs now extubated


Current Visit: Yes   Status: Acute   


Qualifiers: 


   Chronicity: acute   Respiratory failure complication: unspecified whether 

with hypoxia or hypercapnia   Qualified Code(s): J96.00 - Acute respiratory 

failure, unspecified whether with hypoxia or hypercapnia   


Plan to address problem: 


Respiratory failure I reviewed all CT scans head chest abdomen pelvis chest x-

ray.   The to help with underlying condition and workup.


Pulmonary following the patient


?Pharyngeal swelling, will discuss with Pulmonary about repeat imaging studies 

to better evaluate


VAP bundle


aspiration precautions


Pressure ulcer prevention method


repeat ct cervical spine





(3) Cocaine abuse


Current Visit: Yes   Status: Acute   


Plan to address problem: 


Patient is no longer tachycardic or shows any evidence of crack overdose of 

cocaine overdose at this particular time.  Remains intubated however.





(4) Suicidal ideation


Current Visit: Yes   Status: Acute   


Plan to address problem: 


Patient with suicide attempt.   1013 


Mental health consult placed








(5)Acute metabolic Encephalopathy


Unchanged, POA,. likely secondary to Cocain toxicity and hypoxIa,


IF persist, will obtain Neurology eval in am





(6) Severe Protien Malnutrition


Current Visit: Yes   Status: Acute   


Plan to address problem: 


Most likely secondary to drug abuse patient remains cachectic.  We'll encourage 

boost once patient is extubated.





can transfer to the floor





History


Interval history: 


Patient seen and examined, no family at bedside. Per information reviewed, 

patient previous accepted in a drug rehab facility last year but did not go. 

Remains lethargic but now extubated to nasal cannula


No clinical change, no adverse event reported overnight





Hospitalist Physical





- Physical exam


Narrative exam: 


General appearance: Present: severe distress, cachectic, disheveled, lethargic


- EENT


Eyes: Present: PERRL


ENT: poor dentition, 





- Neck


Neck: Present: supple, normal ROM





- Respiratory


Respiratory effort: normal


Respiratory: bilateral: CTA (bronchivesicular)





- Cardiovascular


Rhythm: regular


Heart Sounds: Present: S1 & S2.  Absent: systolic murmur





- Extremities


Extremities: no ischemia, pulses intact, pulses symmetrical, No edema, normal 

temperature, normal color, Full ROM


Peripheral Pulses: within normal limits





- Abdominal


General gastrointestinal: soft, non-tender , non-distended, normal bowel sounds





- Integumentary


Integumentary: Present: warm, bilateral upper ext edema





- Psychiatric


Psychiatric: othe





- Neurologic


Neurologic: other





- Allied Health


Allied health notes reviewed: nursing











- Constitutional


Vitals: 


                                        











Temp Pulse Resp BP Pulse Ox


 


 97.9 F   80   17   120/67   99 


 


 11/28/19 08:00  11/28/19 11:00  11/28/19 11:00  11/28/19 11:00  11/28/19 10:30











General appearance: Present: severe distress, cachectic, disheveled, other 

(intubated sedation.)





Results





- Labs


CBC & Chem 7: 


                                 11/25/19 04:32





                                 11/25/19 04:32


Labs: 


                             Laboratory Last Values











WBC  4.0 K/mm3 (4.5-11.0)  L  11/25/19  04:32    


 


RBC  4.32 M/mm3 (3.65-5.03)   11/25/19  04:32    


 


Hgb  14.5 gm/dl (11.8-15.2)   11/25/19  04:32    


 


Hct  43.3 % (35.5-45.6)   11/25/19  04:32    


 


MCV  100 fl (84-94)  H  11/25/19  04:32    


 


MCH  34 pg (28-32)  H  11/25/19  04:32    


 


MCHC  33 % (32-34)   11/25/19  04:32    


 


RDW  13.4 % (13.2-15.2)   11/25/19  04:32    


 


Plt Count  371 K/mm3 (140-440)   11/25/19  04:32    


 


Lymph % (Auto)  9.6 % (13.4-35.0)  L  11/25/19  04:32    


 


Mono % (Auto)  3.4 % (0.0-7.3)   11/25/19  04:32    


 


Eos % (Auto)  0.0 % (0.0-4.3)   11/25/19  04:32    


 


Baso % (Auto)  0.6 % (0.0-1.8)   11/25/19  04:32    


 


Lymph #  0.4 K/mm3 (1.2-5.4)  L  11/25/19  04:32    


 


Mono #  0.1 K/mm3 (0.0-0.8)   11/25/19  04:32    


 


Eos #  0.0 K/mm3 (0.0-0.4)   11/25/19  04:32    


 


Baso #  0.0 K/mm3 (0.0-0.1)   11/25/19  04:32    


 


Seg Neutrophils %  86.4 % (40.0-70.0)  H  11/25/19  04:32    


 


Seg Neutrophils #  3.5 K/mm3 (1.8-7.7)   11/25/19  04:32    


 


PT  30.5 Sec. (12.2-14.9)  H  11/24/19  11:10    


 


INR  3.00  (0.87-1.13)  H  11/24/19  11:10    


 


APTT  29.7 Sec. (24.2-36.6)   11/24/19  11:10    


 


POC ABG pH  7.418  (7.35-7.45)   11/27/19  05:00    


 


POC ABG pCO2  36.8  (35-45)   11/27/19  05:00    


 


POC ABG pO2  96  ()   11/27/19  05:00    


 


POC ABG HCO3  23.8  (22-26 mml/L)   11/27/19  05:00    


 


POC ABG Total CO2  25  (23-27mmol/L)   11/27/19  05:00    


 


POC ABG O2 Sat  98   11/27/19  05:00    


 


POC ABG Base Excess  -1  ((-2) - (+3)mmol/L)   11/27/19  05:00    


 


FiO2  30 %  11/27/19  05:00    


 


Sodium  141 mmol/L (137-145)   11/25/19  04:32    


 


Potassium  4.8 mmol/L (3.6-5.0)  D 11/25/19  04:32    


 


Chloride  107.5 mmol/L ()  H  11/25/19  04:32    


 


Carbon Dioxide  23 mmol/L (22-30)   11/25/19  04:32    


 


Anion Gap  15 mmol/L  11/25/19  04:32    


 


BUN  10 mg/dL (9-20)   11/25/19  04:32    


 


Creatinine  0.9 mg/dL (0.8-1.5)   11/25/19  04:32    


 


Estimated GFR  > 60 ml/min  11/25/19  04:32    


 


BUN/Creatinine Ratio  11 %  11/25/19  04:32    


 


Glucose  118 mg/dL ()  H  11/25/19  04:32    


 


Hemoglobin A1c  5.3 % (4-6)   11/24/19  11:10    


 


Calcium  9.4 mg/dL (8.4-10.2)   11/25/19  04:32    


 


Phosphorus  2.90 mg/dL (2.5-4.5)   11/24/19  19:30    


 


Magnesium  1.70 mg/dL (1.7-2.3)   11/24/19  19:30    


 


Total Bilirubin  0.40 mg/dL (0.1-1.2)   11/25/19  04:32    


 


Direct Bilirubin  < 0.2 mg/dL (0-0.2)   11/24/19  11:10    


 


Indirect Bilirubin  0.2 mg/dL  11/24/19  11:10    


 


AST  73 units/L (5-40)  H  11/25/19  04:32    


 


ALT  56 units/L (7-56)   11/25/19  04:32    


 


Alkaline Phosphatase  82 units/L ()   11/25/19  04:32    


 


Ammonia  29.0 umol/L (25-60)   11/24/19  15:02    


 


Total Creatine Kinase  215 units/L ()  H  11/24/19  11:10    


 


CK-MB (CK-2)  4.5 ng/mL (0.0-4.0)  H  11/24/19  11:10    


 


CK-MB (CK-2) Rel Index  2.0  (0-4)   11/24/19  11:10    


 


Troponin T  < 0.010 ng/mL (0.00-0.029)   11/24/19  11:10    


 


NT-Pro-B Natriuret Pep  33.88 pg/mL (0-450)   11/24/19  11:10    


 


Total Protein  7.4 g/dL (6.3-8.2)   11/25/19  04:32    


 


Albumin  3.2 g/dL (3.9-5)  L  11/25/19  04:32    


 


Albumin/Globulin Ratio  0.8 %  11/25/19  04:32    


 


Amylase  438 units/L ()  H  11/24/19  19:30    


 


Lipase  8 units/L (13-60)  L  11/24/19  11:10    


 


Urine Color  Yellow  (Yellow)   11/24/19  11:59    


 


Urine Turbidity  Clear  (Clear)   11/24/19  11:59    


 


Urine pH  6.0  (5.0-7.0)   11/24/19  11:59    


 


Ur Specific Gravity  1.020  (1.003-1.030)   11/24/19  11:59    


 


Urine Protein  <15 mg/dl mg/dL (Negative)   11/24/19  11:59    


 


Urine Glucose (UA)  Neg mg/dL (Negative)   11/24/19  11:59    


 


Urine Ketones  Tr mg/dL (Negative)   11/24/19  11:59    


 


Urine Blood  Sm  (Negative)   11/24/19  11:59    


 


Urine Nitrite  Neg  (Negative)   11/24/19  11:59    


 


Urine Bilirubin  Neg  (Negative)   11/24/19  11:59    


 


Urine Urobilinogen  4.0 mg/dL (<2.0)   11/24/19  11:59    


 


Ur Leukocyte Esterase  Neg  (Negative)   11/24/19  11:59    


 


Urine WBC (Auto)  3.0 /HPF (0.0-6.0)   11/24/19  11:59    


 


Urine RBC (Auto)  25.0 /HPF (0.0-6.0)   11/24/19  11:59    


 


U Epithel Cells (Auto)  1.0 /HPF (0-13.0)   11/24/19  11:59    


 


Calcium Oxalate Crystal  Few   11/24/19  11:59    


 


Urine Mucus  Few /HPF  11/24/19  11:59    


 


Salicylates  < 0.3 mg/dL (2.8-20.0)  L  11/24/19  11:10    


 


Urine Opiates Screen  Presumptive negative   11/24/19  09:57    


 


Urine Methadone Screen  Presumptive negative   11/24/19  09:57    


 


Acetaminophen  < 5.0 ug/mL (10.0-30.0)  L  11/24/19  11:10    


 


Ur Barbiturates Screen  Presumptive negative   11/24/19  09:57    


 


Ur Phencyclidine Scrn  Presumptive negative   11/24/19  09:57    


 


Ur Amphetamines Screen  Presumptive negative   11/24/19  09:57    


 


U Benzodiazepines Scrn  Presumptive positive   11/24/19  09:57    


 


Urine Cocaine Screen  Presumptive positive   11/24/19  09:57    


 


U Marijuana (THC) Screen  Presumptive negative   11/24/19  09:57    


 


Drugs of Abuse Note  Disclamer   11/24/19  09:57    


 


Plasma/Serum Alcohol  < 0.01 % (0-0.07)   11/24/19  11:10    


 


Blood Type  O POSITIVE   11/24/19  10:05    


 


Antibody Screen  Negative   11/24/19  10:05    














Active Medications





- Current Medications


Current Medications: 














Generic Name Dose Route Start Last Admin





  Trade Name Freq  PRN Reason Stop Dose Admin


 


Acetaminophen  650 mg  11/24/19 19:14 





  Tylenol  PO  





  Q4H PRN  





  Pain MILD(1-3)/Fever >100.5/HA  


 


Albuterol  2.5 mg  11/24/19 19:53 





  Proventil  IH  





  Q4HRT PRN  





  Shortness Of Breath  


 


Arformoterol Tartrate  15 mcg  11/28/19 10:00 





  Brovana Nebu  IH  





  Q12HRT [facility]  


 


Budesonide  0.25 mg  11/28/19 10:00 





  Pulmicort  IH  





  Q12HRT MARIE  


 


Clonazepam  0.5 mg  11/26/19 22:00  11/28/19 10:55





  Klonopin  PO   0.5 mg





  BID MARIE   Administration


 


Famotidine  20 mg  11/28/19 10:00  11/28/19 10:55





  Pepcid  PO   20 mg





  BID MARIE   Administration


 


Heparin Sodium (Porcine)  5,000 unit  11/25/19 10:00  11/28/19 10:55





  Heparin  SUB-Q   5,000 unit





  Q12HR MARIE   Administration


 


Hydrophilic Ointment  1 applic  11/24/19 09:47 





  Vaseline Lip Therapy  TP  





  Q2HR PRN  





  Dry Lips  


 


Levofloxacin/Dextrose  750 mg in 150 mls @ 100 mls/hr  11/24/19 20:00  11/27/19 

23:21





  Levaquin 750mg/150ml  IV  11/28/19 21:29  100 mls/hr





  Q24H MARIE   Administration





  Protocol  


 


Lorazepam  2 mg  11/24/19 19:30  11/28/19 11:34





  Ativan  IV   2 mg





  Q1H PRN   Administration





  CIWA-Ar 8-15  


 


Lorazepam  4 mg  11/24/19 19:30  11/28/19 07:06





  Ativan  IV   4 mg





  Q1H PRN   Administration





  CIWA-Ar 16-25  


 


Methylprednisolone Sodium Succinate  40 mg  11/24/19 22:00  11/28/19 07:06





  Solu-Medrol  IV   40 mg





  Q8HR MARIE   Administration


 


Multi-Ingred Cream/Lotion/Oil/Oint  1 applic  11/24/19 09:47 





  Artificial Tears Ophth Oint  OU  





  Q4HR PRN  





  Dry Eye(s)  


 


Nicotine  7 mg  11/28/19 10:00  11/28/19 11:34





  Habitrol  TD   7 mg





  QDAY MARIE   Administration


 


Ondansetron HCl  4 mg  11/24/19 19:14 





  Zofran  IV  





  Q8H PRN  





  Nausea And Vomiting  


 


Sodium Chloride  10 ml  11/24/19 22:00  11/28/19 11:37





  Sodium Chloride Flush Syringe 10 Ml  IV   10 ml





  BID MARIE   Administration


 


Sodium Chloride  10 ml  11/24/19 19:14 





  Sodium Chloride Flush Syringe 10 Ml  IV  





  PRN PRN  





  LINE FLUSH  














Nutrition/Malnutrition Assess





- Dietary Evaluation


Nutrition/Malnutrition Findings: 


                                        





Nutrition Notes                                            Start:  11/25/19 

11:28


Freq:                                                      Status: Active       




Protocol:                                                                       




 Document     11/27/19 09:36  LM  (Rec: 11/27/19 09:42    [id number])


 Nutrition Notes


     Initial or Follow up                        Reassessment


     Current Diagnosis                           COPD,Respiratory Failure,


                                                 Malnutrition


     Other Pertinent Diagnosis                   cocaine abuse, fall


     Current Diet                                Vital AF 1.2 at 60 ml/hr


     Labs/Tests                                  


     Pertinent Medications                       Solumedrol


     Height                                      5 ft 8 in


     Weight                                      59.3 kg


     Ideal Body Weight (kg)                      70.00


     BMI                                         19.8


     Subjective/Other Information                Vital AF running at 60 ml/hr.


                                                 Per RN pt is tolerating TF.


     Percent of energy/protein needs met:        99%/100%


     Burn                                        Absent


     Trauma                                      Absent


     Current % PO                                Negligible


     Minimum of two criteria                     Yes


     Body Fat Depletion                          Mild depletion (non-severe)


     Muscle Mass                                 Mild Depletion (non-severe)


     #1


      Nutrition Diagnosis                        Malnutrition


      Diagnosis Progress(for reassessment        Continues


       documentation)                            


     Is patient on ventilator?                   Yes


     Is Patient Ambulatory and/or Out of Bed     No


     REE-([place]-[facility] [facility]-confined to bed)      [phone]


     [facility] Recommendations        [facility]


     Additional Notes                            Protein needs are 71-119g (1.2


                                                 -2g/kg)


                                                 Fluid needs are 1ml/kcal


 Nutrition Intervention


     Change Diet Order:                          TF


     Nutrition Support:                          Vital 1.2 at 60ml/hr


                                                 Water flush 100ml q4h


     Kcal                                        1,728


     Protein (gm)                                109


     Fluid (mL)                                  1,163


     Goal #1                                     Meet at least 80% of kcal and


                                                 protein needs via TF


     Anticipated Discharge Needs:                Unable to determine at this


                                                 time


     Follow-Up By:                               12/04/19


     Additional Comments                         F/U for TF tolerance

## 2019-11-29 RX ADMIN — ARFORMOTEROL TARTRATE SCH MCG: 15 SOLUTION RESPIRATORY (INHALATION) at 19:38

## 2019-11-29 RX ADMIN — HEPARIN SODIUM SCH UNIT: 5000 INJECTION, SOLUTION INTRAVENOUS; SUBCUTANEOUS at 22:54

## 2019-11-29 RX ADMIN — HEPARIN SODIUM SCH UNIT: 5000 INJECTION, SOLUTION INTRAVENOUS; SUBCUTANEOUS at 10:00

## 2019-11-29 RX ADMIN — FAMOTIDINE SCH MG: 20 TABLET ORAL at 22:54

## 2019-11-29 RX ADMIN — BUDESONIDE SCH MG: 0.25 INHALANT RESPIRATORY (INHALATION) at 07:40

## 2019-11-29 RX ADMIN — ARFORMOTEROL TARTRATE SCH MCG: 15 SOLUTION RESPIRATORY (INHALATION) at 07:40

## 2019-11-29 RX ADMIN — FAMOTIDINE SCH MG: 20 TABLET ORAL at 10:00

## 2019-11-29 RX ADMIN — Medication SCH ML: at 10:00

## 2019-11-29 RX ADMIN — METHYLPREDNISOLONE SODIUM SUCCINATE SCH MG: 125 INJECTION, POWDER, FOR SOLUTION INTRAMUSCULAR; INTRAVENOUS at 22:55

## 2019-11-29 RX ADMIN — METHYLPREDNISOLONE SODIUM SUCCINATE SCH MG: 125 INJECTION, POWDER, FOR SOLUTION INTRAMUSCULAR; INTRAVENOUS at 06:00

## 2019-11-29 RX ADMIN — NICOTINE SCH MG: 7 PATCH TRANSDERMAL at 10:00

## 2019-11-29 RX ADMIN — METHYLPREDNISOLONE SODIUM SUCCINATE SCH MG: 125 INJECTION, POWDER, FOR SOLUTION INTRAMUSCULAR; INTRAVENOUS at 14:49

## 2019-11-29 RX ADMIN — Medication SCH ML: at 22:56

## 2019-11-29 RX ADMIN — BUDESONIDE SCH MG: 0.25 INHALANT RESPIRATORY (INHALATION) at 19:39

## 2019-11-29 NOTE — PROGRESS NOTE
Assessment and Plan





- Patient Problems


(1) Altered mental status


Current Visit: Yes   Status: Acute   


Qualifiers: 


   Altered mental status type: delirium   Qualified Code(s): R41.0 - 

Disorientation, unspecified   





(2) Cocaine abuse


Current Visit: Yes   Status: Acute   





(3) Fall


Current Visit: Yes   Status: Acute   


Qualifiers: 


   Encounter type: initial encounter   Qualified Code(s): W19.XXXA - Unspecified

fall, initial encounter   





(4) Respiratory failure


Current Visit: Yes   Status: Acute   


Qualifiers: 


   Chronicity: acute   Respiratory failure complication: unspecified whether 

with hypoxia or hypercapnia   Qualified Code(s): J96.00 - Acute respiratory 

failure, unspecified whether with hypoxia or hypercapnia   





(5) Suicidal ideation


Current Visit: Yes   Status: Acute   





(6) Bronchospasm


Current Visit: No   Status: Acute   





(7) Nicotine dependence unspecified, with withdrawal


Current Visit: No   Status: Acute   


Qualifiers: 


   Nicotine product type: cigarettes   Qualified Code(s): F17.213 - Nicotine 

dependence, cigarettes, with withdrawal   





(8) Polysubstance abuse


Current Visit: No   Status: Acute   





(9) Transaminitis


Current Visit: No   Status: Acute   





(10) Injury of cervical spine


Current Visit: Yes   Status: Suspected   





Subjective


Principal diagnosis: Acute respiratory failure


Interval history: 





refused ct earlier


agreeable now


no neck pain or sob





Objective


                               Vital Signs - 12hr











  11/28/19 11/29/19 11/29/19





  22:00 05:33 06:09


 


Temperature  97.8 F 98.0 F


 


Pulse Rate 76 65 66


 


Pulse Rate [   





Bilateral   





Throughout]   


 


Pulse Rate [ 88  





From Monitor]   


 


Respiratory 16 16 16





Rate   


 


Respiratory   





Rate [Bilateral   





Throughout]   


 


Blood Pressure  97/40 105/57


 


O2 Sat by Pulse 98 98 100





Oximetry   














  11/29/19 11/29/19





  07:40 08:32


 


Temperature  


 


Pulse Rate  


 


Pulse Rate [ 82 





Bilateral  





Throughout]  


 


Pulse Rate [  





From Monitor]  


 


Respiratory  





Rate  


 


Respiratory 16 





Rate [Bilateral  





Throughout]  


 


Blood Pressure  


 


O2 Sat by Pulse  96





Oximetry  











Constitutional: no acute distress, alert


Eyes: non-icteric


ENT: oropharynx moist


Neck: supple


Effort: normal


Ascultation: Bilateral: clear


Cardiovascular: regular rate and rhythm (no mrg)


Gastrointestinal: normoactive bowel sounds, soft, non-tender, non-distended


Integumentary: normal


Extremities: no cyanosis, no edema, pink and warm


Neurologic: other (Cannot obtain as intubated/sedated)


Psychiatric: other (Cannot obtain as intubated/sedated)


CBC and BMP: 


                                 11/25/19 04:32





                                 11/25/19 04:32


ABG, PT/INR, D-dimer: 


ABG











POC ABG pH  7.418  (7.35-7.45)   11/27/19  05:00    


 


POC ABG pCO2  36.8  (35-45)   11/27/19  05:00    


 


POC ABG pO2  96  ()   11/27/19  05:00    


 


POC ABG HCO3  23.8  (22-26 mml/L)   11/27/19  05:00    


 


POC ABG Total CO2  25  (23-27mmol/L)   11/27/19  05:00    


 


POC ABG O2 Sat  98   11/27/19  05:00    





PT/INR, D-dimer











PT  30.5 Sec. (12.2-14.9)  H  11/24/19  11:10    


 


INR  3.00  (0.87-1.13)  H  11/24/19  11:10    








Abnormal lab findings: 


                                  Abnormal Labs











  11/24/19 11/24/19 11/24/19





  11:10 11:10 11:10


 


WBC   


 


MCV  99 H  


 


MCH  34 H  


 


RDW  13.1 L  


 


Lymph % (Auto)   


 


Mono % (Auto)  12.1 H  


 


Baso % (Auto)  1.9 H  


 


Lymph #   


 


Mono #  1.2 H  


 


Baso #  0.2 H  


 


Seg Neutrophils %   


 


PT   30.5 H 


 


INR   3.00 H 


 


POC ABG pH   


 


POC ABG pCO2   


 


POC ABG pO2   


 


Chloride   


 


Carbon Dioxide    18 L


 


Glucose    103 H


 


AST    58 H


 


Total Creatine Kinase    215 H


 


CK-MB (CK-2)    4.5 H


 


Albumin    3.1 L


 


Amylase   


 


Lipase    8 L


 


Salicylates   


 


Acetaminophen   














  11/24/19 11/24/19 11/24/19





  11:10 11:10 11:33


 


WBC   


 


MCV   


 


MCH   


 


RDW   


 


Lymph % (Auto)   


 


Mono % (Auto)   


 


Baso % (Auto)   


 


Lymph #   


 


Mono #   


 


Baso #   


 


Seg Neutrophils %   


 


PT   


 


INR   


 


POC ABG pH    7.291 L


 


POC ABG pCO2    49.7 H


 


POC ABG pO2    234 H


 


Chloride   


 


Carbon Dioxide   


 


Glucose   


 


AST   


 


Total Creatine Kinase   


 


CK-MB (CK-2)   


 


Albumin   


 


Amylase   


 


Lipase   


 


Salicylates  < 0.3 L  


 


Acetaminophen   < 5.0 L 














  11/24/19 11/25/19 11/25/19





  19:30 04:18 04:32


 


WBC    4.0 L


 


MCV    100 H


 


MCH    34 H


 


RDW   


 


Lymph % (Auto)    9.6 L


 


Mono % (Auto)   


 


Baso % (Auto)   


 


Lymph #    0.4 L


 


Mono #   


 


Baso #   


 


Seg Neutrophils %    86.4 H


 


PT   


 


INR   


 


POC ABG pH   7.348 L 


 


POC ABG pCO2   


 


POC ABG pO2   108 H 


 


Chloride   


 


Carbon Dioxide   


 


Glucose   


 


AST   


 


Total Creatine Kinase   


 


CK-MB (CK-2)   


 


Albumin  3.2 L  


 


Amylase  438 H  


 


Lipase   


 


Salicylates   


 


Acetaminophen   














  11/25/19





  04:32


 


WBC 


 


MCV 


 


MCH 


 


RDW 


 


Lymph % (Auto) 


 


Mono % (Auto) 


 


Baso % (Auto) 


 


Lymph # 


 


Mono # 


 


Baso # 


 


Seg Neutrophils % 


 


PT 


 


INR 


 


POC ABG pH 


 


POC ABG pCO2 


 


POC ABG pO2 


 


Chloride  107.5 H


 


Carbon Dioxide 


 


Glucose  118 H


 


AST  73 H


 


Total Creatine Kinase 


 


CK-MB (CK-2) 


 


Albumin  3.2 L


 


Amylase 


 


Lipase 


 


Salicylates 


 


Acetaminophen

## 2019-11-29 NOTE — PROGRESS NOTE
Assessment and Plan


Assessment and plan: 


patient 48 year old with a history of emphysema, crack cocaine abuse and tobacco

abuse presents after [place] bridge with a failed attempt at suicide.  

Patient was threatening to jump off a bridge he ended up falling down 

approximately 4 foot embankment.  Patient was brought to the ED was found to be 

in acute distress with agonal breathing.  Patient required intubation because of

acute hypoxemia.   Patient will require psych consult and 1013 after he wakes up

and is more stable and responsive.








CT Cervical Spine: IMPRESSION: 1. No indication of fracture or traumatic ellis

bluxation. 2. Increased soft tissue fullness in the prevertebral and 

retropharyngeal region may represent retained secretions secondary to 

nasotracheal intubation, however, follow-up study is suggested to exclude other 

retropharyngeal or prevertebral pathology such as mass or hematoma. 3. Extensive

biapical bullous changes in both lung apices. 


chest xray: IMPRESSION: 1. Stable appearance of the chest. No acute Pathology


Bilateral Tib/fiB XRAY: IMPRESSION: No acute abnormality of either tibia/fibula.




CT A/P IMPRESSION: 1. Limited noncontrast exam demonstrating no acute 

abnormality of the abdomen or pelvis. 2. Additional findings as above. 


CT HEAD/BRAIN: IMPRESSION: Cranial CT scan within normal limits. Mild sinus 

disease, likely chronic.





* Extubated and doing well


* Bilateral upper ext edema improving


[facility]








- Patient Problems


(1) COPD exacerbation


Current Visit: No   Status: Acute   


Plan to address problem: 


Patient has wheezing and emphysema.  Chest x-ray consistent with emphysema.  

Most likely exacerbated by suicide attempt and crack abuse.


Pulmonary following, now extubated


Tobacco use disorder- 15 mins counselling povided





(2) Respiratory failure on mechanical ventilation for >48 hrs now extubated


Current Visit: Yes   Status: Acute   


Qualifiers: 


   Chronicity: acute   Respiratory failure complication: unspecified whether 

with hypoxia or hypercapnia   Qualified Code(s): J96.00 - Acute respiratory 

failure, unspecified whether with hypoxia or hypercapnia   


Plan to address problem: 


Respiratory failure I reviewed all CT scans head chest abdomen pelvis chest x-

ray.   The to help with underlying condition and workup.


Pulmonary following the patient


?Pharyngeal swelling, will discuss with Pulmonary about repeat imaging studies 

to better evaluate


VAP bundle


aspiration precautions


Pressure ulcer prevention method


repeat ct cervical spine





(3) Cocaine abuse


Current Visit: Yes   Status: Acute   


Plan to address problem: 


Patient is no longer tachycardic or shows any evidence of crack overdose of 

cocaine overdose at this particular time.  15 mins counselling provided





(4) Suicidal ideation


Current Visit: Yes   Status: Acute   


Plan to address problem: 


Patient with suicide attempt.   1013 


Mental health consult placed








(5)Acute metabolic Encephalopathy


Unchanged, POA,. likely secondary to Cocain toxicity and hypoxIa,


IF persist, will obtain Neurology eval in am





(6) Severe Protien Malnutrition


Current Visit: Yes   Status: Acute   


Plan to address problem: 


Most likely secondary to drug abuse patient remains cachectic.  We'll encourage 

boost once patient is extubated.





Repeat ct neck ordered


Medically cleared for discharge once cleared by psych





History


Interval history: 


Patient seen and examined, no family at bedside. Per information reviewed, 

patient previous accepted in a drug rehab facility last year but did not go. 

DOING WELL ON ROOM AIR following extubation


No clinical change, no adverse event reported overnight although refused CT neck

this am and wanted to leave the hospital Reports improvement in respiration and 

no neck pain





Hospitalist Physical





- Physical exam


Narrative exam: 


General appearance: Present: no distress cachectic, disheveled, 


- EENT


Eyes: Present: PERRL


ENT: poor dentition, 





- Neck


Neck: Present: supple, normal ROM





- Respiratory


Respiratory effort: normal


Respiratory: bilateral: CTA (bronchivesicular)





- Cardiovascular


Rhythm: regular


Heart Sounds: Present: S1 & S2.  Absent: systolic murmur





- Extremities


Extremities: no ischemia, pulses intact, pulses symmetrical, No edema, normal 

temperature, normal color, Full ROM


Peripheral Pulses: within normal limits





- Abdominal


General gastrointestinal: soft, non-tender , non-distended, normal bowel sounds





- Integumentary


Integumentary: Present: warm, bilateral upper ext edema, right Fletcher swelling, 

chronic per patient. 





- Psychiatric


Psychiatric: othe





- Neurologic


Neurologic: other





- Allied Health


Allied health notes reviewed: nursing











- Constitutional


Vitals: 


                                        











Temp Pulse Resp BP Pulse Ox


 


 99.2 F   59 L  16   95/66   86 


 


 11/29/19 12:03  11/29/19 12:03  11/29/19 07:40  11/29/19 12:03  11/29/19 12:03











General appearance: Present: severe distress, cachectic, disheveled, other 

(intubated sedation.)





Results





- Labs


CBC & Chem 7: 


                                 11/25/19 04:32





                                 11/25/19 04:32


Labs: 


                             Laboratory Last Values











WBC  4.0 K/mm3 (4.5-11.0)  L  11/25/19  04:32    


 


RBC  4.32 M/mm3 (3.65-5.03)   11/25/19  04:32    


 


Hgb  14.5 gm/dl (11.8-15.2)   11/25/19  04:32    


 


Hct  43.3 % (35.5-45.6)   11/25/19  04:32    


 


MCV  100 fl (84-94)  H  11/25/19  04:32    


 


MCH  34 pg (28-32)  H  11/25/19  04:32    


 


MCHC  33 % (32-34)   11/25/19  04:32    


 


RDW  13.4 % (13.2-15.2)   11/25/19  04:32    


 


Plt Count  371 K/mm3 (140-440)   11/25/19  04:32    


 


Lymph % (Auto)  9.6 % (13.4-35.0)  L  11/25/19  04:32    


 


Mono % (Auto)  3.4 % (0.0-7.3)   11/25/19  04:32    


 


Eos % (Auto)  0.0 % (0.0-4.3)   11/25/19  04:32    


 


Baso % (Auto)  0.6 % (0.0-1.8)   11/25/19  04:32    


 


Lymph #  0.4 K/mm3 (1.2-5.4)  L  11/25/19  04:32    


 


Mono #  0.1 K/mm3 (0.0-0.8)   11/25/19  04:32    


 


Eos #  0.0 K/mm3 (0.0-0.4)   11/25/19  04:32    


 


Baso #  0.0 K/mm3 (0.0-0.1)   11/25/19  04:32    


 


Seg Neutrophils %  86.4 % (40.0-70.0)  H  11/25/19  04:32    


 


Seg Neutrophils #  3.5 K/mm3 (1.8-7.7)   11/25/19  04:32    


 


PT  30.5 Sec. (12.2-14.9)  H  11/24/19  11:10    


 


INR  3.00  (0.87-1.13)  H  11/24/19  11:10    


 


APTT  29.7 Sec. (24.2-36.6)   11/24/19  11:10    


 


POC ABG pH  7.418  (7.35-7.45)   11/27/19  05:00    


 


POC ABG pCO2  36.8  (35-45)   11/27/19  05:00    


 


POC ABG pO2  96  ()   11/27/19  05:00    


 


POC ABG HCO3  23.8  (22-26 mml/L)   11/27/19  05:00    


 


POC ABG Total CO2  25  (23-27mmol/L)   11/27/19  05:00    


 


POC ABG O2 Sat  98   11/27/19  05:00    


 


POC ABG Base Excess  -1  ((-2) - (+3)mmol/L)   11/27/19  05:00    


 


FiO2  30 %  11/27/19  05:00    


 


Sodium  141 mmol/L (137-145)   11/25/19  04:32    


 


Potassium  4.8 mmol/L (3.6-5.0)  D 11/25/19  04:32    


 


Chloride  107.5 mmol/L ()  H  11/25/19  04:32    


 


Carbon Dioxide  23 mmol/L (22-30)   11/25/19  04:32    


 


Anion Gap  15 mmol/L  11/25/19  04:32    


 


BUN  10 mg/dL (9-20)   11/25/19  04:32    


 


Creatinine  0.9 mg/dL (0.8-1.5)   11/25/19  04:32    


 


Estimated GFR  > 60 ml/min  11/25/19  04:32    


 


BUN/Creatinine Ratio  11 %  11/25/19  04:32    


 


Glucose  118 mg/dL ()  H  11/25/19  04:32    


 


Hemoglobin A1c  5.3 % (4-6)   11/24/19  11:10    


 


Calcium  9.4 mg/dL (8.4-10.2)   11/25/19  04:32    


 


Phosphorus  2.90 mg/dL (2.5-4.5)   11/24/19  19:30    


 


Magnesium  1.70 mg/dL (1.7-2.3)   11/24/19  19:30    


 


Total Bilirubin  0.40 mg/dL (0.1-1.2)   11/25/19  04:32    


 


Direct Bilirubin  < 0.2 mg/dL (0-0.2)   11/24/19  11:10    


 


Indirect Bilirubin  0.2 mg/dL  11/24/19  11:10    


 


AST  73 units/L (5-40)  H  11/25/19  04:32    


 


ALT  56 units/L (7-56)   11/25/19  04:32    


 


Alkaline Phosphatase  82 units/L ()   11/25/19  04:32    


 


Ammonia  29.0 umol/L (25-60)   11/24/19  15:02    


 


Total Creatine Kinase  215 units/L ()  H  11/24/19  11:10    


 


CK-MB (CK-2)  4.5 ng/mL (0.0-4.0)  H  11/24/19  11:10    


 


CK-MB (CK-2) Rel Index  2.0  (0-4)   11/24/19  11:10    


 


Troponin T  < 0.010 ng/mL (0.00-0.029)   11/24/19  11:10    


 


NT-Pro-B Natriuret Pep  33.88 pg/mL (0-450)   11/24/19  11:10    


 


Total Protein  7.4 g/dL (6.3-8.2)   11/25/19  04:32    


 


Albumin  3.2 g/dL (3.9-5)  L  11/25/19  04:32    


 


Albumin/Globulin Ratio  0.8 %  11/25/19  04:32    


 


Amylase  438 units/L ()  H  11/24/19  19:30    


 


Lipase  8 units/L (13-60)  L  11/24/19  11:10    


 


Urine Color  Yellow  (Yellow)   11/24/19  11:59    


 


Urine Turbidity  Clear  (Clear)   11/24/19  11:59    


 


Urine pH  6.0  (5.0-7.0)   11/24/19  11:59    


 


Ur Specific Gravity  1.020  (1.003-1.030)   11/24/19  11:59    


 


Urine Protein  <15 mg/dl mg/dL (Negative)   11/24/19  11:59    


 


Urine Glucose (UA)  Neg mg/dL (Negative)   11/24/19  11:59    


 


Urine Ketones  Tr mg/dL (Negative)   11/24/19  11:59    


 


Urine Blood  Sm  (Negative)   11/24/19  11:59    


 


Urine Nitrite  Neg  (Negative)   11/24/19  11:59    


 


Urine Bilirubin  Neg  (Negative)   11/24/19  11:59    


 


Urine Urobilinogen  4.0 mg/dL (<2.0)   11/24/19  11:59    


 


Ur Leukocyte Esterase  Neg  (Negative)   11/24/19  11:59    


 


Urine WBC (Auto)  3.0 /HPF (0.0-6.0)   11/24/19  11:59    


 


Urine RBC (Auto)  25.0 /HPF (0.0-6.0)   11/24/19  11:59    


 


U Epithel Cells (Auto)  1.0 /HPF (0-13.0)   11/24/19  11:59    


 


Calcium Oxalate Crystal  Few   11/24/19  11:59    


 


Urine Mucus  Few /HPF  11/24/19  11:59    


 


Salicylates  < 0.3 mg/dL (2.8-20.0)  L  11/24/19  11:10    


 


Urine Opiates Screen  Presumptive negative   11/24/19  09:57    


 


Urine Methadone Screen  Presumptive negative   11/24/19  09:57    


 


Acetaminophen  < 5.0 ug/mL (10.0-30.0)  L  11/24/19  11:10    


 


Ur Barbiturates Screen  Presumptive negative   11/24/19  09:57    


 


Ur Phencyclidine Scrn  Presumptive negative   11/24/19  09:57    


 


Ur Amphetamines Screen  Presumptive negative   11/24/19  09:57    


 


U Benzodiazepines Scrn  Presumptive positive   11/24/19  09:57    


 


Urine Cocaine Screen  Presumptive positive   11/24/19  09:57    


 


U Marijuana (THC) Screen  Presumptive negative   11/24/19  09:57    


 


Drugs of Abuse Note  Disclamer   11/24/19  09:57    


 


Plasma/Serum Alcohol  < 0.01 % (0-0.07)   11/24/19  11:10    


 


Blood Type  O POSITIVE   11/24/19  10:05    


 


Antibody Screen  Negative   11/24/19  10:05    














Active Medications





- Current Medications


Current Medications: 














Generic Name Dose Route Start Last Admin





  Trade Name Freq  PRN Reason Stop Dose Admin


 


Acetaminophen  650 mg  11/24/19 19:14  11/28/19 13:02





  Tylenol  PO   650 mg





  Q4H PRN   Administration





  Pain MILD(1-3)/Fever >100.5/HA  


 


Albuterol  2.5 mg  11/24/19 19:53 





  Proventil  IH  





  Q4HRT PRN  





  Shortness Of Breath  


 


Arformoterol Tartrate  15 mcg  11/28/19 10:00  11/29/19 07:40





  Brovana Nebu  IH   15 mcg





  Q12HRT MARIE   Administration


 


Budesonide  0.5 mg  11/28/19 16:44  11/29/19 07:40





  Pulmicort  IH   0.5 mg





  Q12HRT MARIE   Administration


 


Clonazepam  0.5 mg  11/26/19 22:00  11/29/19 10:00





  Klonopin  PO   0.5 mg





  BID MARIE   Administration


 


Famotidine  20 mg  11/28/19 10:00  11/29/19 10:00





  Pepcid  PO   20 mg





  BID MARIE   Administration


 


Heparin Sodium (Porcine)  5,000 unit  11/25/19 10:00  11/29/19 10:00





  Heparin  SUB-Q   5,000 unit





  Q12HR MARIE   Administration


 


Hydrophilic Ointment  1 applic  11/24/19 09:47 





  Vaseline Lip Therapy  TP  





  Q2HR PRN  





  Dry Lips  


 


Lorazepam  2 mg  11/24/19 19:30  11/29/19 14:55





  Ativan  IV   2 mg





  Q1H PRN   Administration





  CIWA-Ar 8-15  


 


Lorazepam  4 mg  11/24/19 19:30  11/28/19 07:06





  Ativan  IV   4 mg





  Q1H PRN   Administration





  CIWA-Ar 16-25  


 


Methylprednisolone Sodium Succinate  40 mg  11/24/19 22:00  11/29/19 14:49





  Solu-Medrol  IV   40 mg





  Q8HR MARIE   Administration


 


Multi-Ingred Cream/Lotion/Oil/Oint  1 applic  11/24/19 09:47 





  Artificial Tears Ophth Oint  OU  





  Q4HR PRN  





  Dry Eye(s)  


 


Nicotine  7 mg  11/28/19 10:00  11/29/19 10:00





  Habitrol  TD   7 mg





  QDAY MARIE   Administration


 


Ondansetron HCl  4 mg  11/24/19 19:14 





  Zofran  IV  





  Q8H PRN  





  Nausea And Vomiting  


 


Sodium Chloride  10 ml  11/24/19 22:00  11/29/19 10:00





  Sodium Chloride Flush Syringe 10 Ml  IV   10 ml





  BID MARIE   Administration


 


Sodium Chloride  10 ml  11/24/19 19:14 





  Sodium Chloride Flush Syringe 10 Ml  IV  





  PRN PRN  





  LINE FLUSH  














Nutrition/Malnutrition Assess





- Dietary Evaluation


Nutrition/Malnutrition Findings: 


                                        





Nutrition Notes                                            Start:  11/25/19 

11:28


Freq:                                                      Status: Active       




Protocol:                                                                       




 Document     11/27/19 09:36  LM  (Rec: 11/27/19 09:42  LM  [id number])


 Nutrition Notes


     Initial or Follow up                        Reassessment


     Current Diagnosis                           COPD,Respiratory Failure,


                                                 Malnutrition


     Other Pertinent Diagnosis                   cocaine abuse, fall


     Current Diet                                Vital AF 1.2 at 60 ml/hr


     Labs/Tests                                  


     Pertinent Medications                       Solumedrol


     Height                                      5 ft 8 in


     Weight                                      59.3 kg


     Ideal Body Weight (kg)                      70.00


     BMI                                         19.8


     Subjective/Other Information                Vital AF running at 60 ml/hr.


                                                 Per RN pt is tolerating TF.


     Percent of energy/protein needs met:        99%/100%


     Burn                                        Absent


     Trauma                                      Absent


     Current % PO                                Negligible


     Minimum of two criteria                     Yes


     Body Fat Depletion                          Mild depletion (non-severe)


     Muscle Mass                                 Mild Depletion (non-severe)


     #1


      Nutrition Diagnosis                        Malnutrition


      Diagnosis Progress(for reassessment        Continues


       documentation)                            


     Is patient on ventilator?                   Yes


     Is Patient Ambulatory and/or Out of Bed     No


     REE-([place]-confined to bed)      1734.804


     Calculation Used for Recommendations        [place]


     Additional Notes                            Protein needs are 71-119g (1.2


                                                 -2g/kg)


                                                 Fluid needs are 1ml/kcal


 Nutrition Intervention


     Change Diet Order:                          TF


     Nutrition Support:                          Vital 1.2 at 60ml/hr


                                                 Water flush 100ml q4h


     Kcal                                        1,728


     Protein (gm)                                109


     Fluid (mL)                                  1,163


     Goal #1                                     Meet at least 80% of kcal and


                                                 protein needs via TF


     Anticipated Discharge Needs:                Unable to determine at this


                                                 time


     Follow-Up By:                               12/04/19


     Additional Comments                         F/U for TF tolerance

## 2019-11-30 LAB
APTT BLD: 26.4 SEC. (ref 24.2–36.6)
BASOPHILS # (AUTO): 0 K/MM3 (ref 0–0.1)
BASOPHILS NFR BLD AUTO: 0.3 % (ref 0–1.8)
BUN SERPL-MCNC: 22 MG/DL (ref 9–20)
BUN/CREAT SERPL: 24 %
CALCIUM SERPL-MCNC: 9.4 MG/DL (ref 8.4–10.2)
EOSINOPHIL # BLD AUTO: 0.1 K/MM3 (ref 0–0.4)
EOSINOPHIL NFR BLD AUTO: 0.4 % (ref 0–4.3)
HCT VFR BLD CALC: 41.8 % (ref 35.5–45.6)
HCT VFR BLD CALC: 43.4 % (ref 35.5–45.6)
HEMOLYSIS INDEX: 40
HGB BLD-MCNC: 13.8 GM/DL (ref 11.8–15.2)
HGB BLD-MCNC: 14.3 GM/DL (ref 11.8–15.2)
INR PPP: 1.08 (ref 0.87–1.13)
LYMPHOCYTES # BLD AUTO: 3.3 K/MM3 (ref 1.2–5.4)
LYMPHOCYTES NFR BLD AUTO: 19.9 % (ref 13.4–35)
MCHC RBC AUTO-ENTMCNC: 33 % (ref 32–34)
MCV RBC AUTO: 99 FL (ref 84–94)
MONOCYTES # (AUTO): 1.8 K/MM3 (ref 0–0.8)
MONOCYTES % (AUTO): 11.1 % (ref 0–7.3)
PLATELET # BLD: 325 K/MM3 (ref 140–440)
PLATELET # BLD: 342 K/MM3 (ref 140–440)
RBC # BLD AUTO: 4.38 M/MM3 (ref 3.65–5.03)

## 2019-11-30 RX ADMIN — ARFORMOTEROL TARTRATE SCH MCG: 15 SOLUTION RESPIRATORY (INHALATION) at 10:09

## 2019-11-30 RX ADMIN — METHYLPREDNISOLONE SODIUM SUCCINATE SCH MG: 125 INJECTION, POWDER, FOR SOLUTION INTRAMUSCULAR; INTRAVENOUS at 14:36

## 2019-11-30 RX ADMIN — FAMOTIDINE SCH MG: 20 TABLET ORAL at 12:10

## 2019-11-30 RX ADMIN — BUDESONIDE SCH MG: 0.25 INHALANT RESPIRATORY (INHALATION) at 21:19

## 2019-11-30 RX ADMIN — NITROGLYCERIN SCH: 20 OINTMENT TOPICAL at 12:20

## 2019-11-30 RX ADMIN — NITROGLYCERIN SCH INCH: 20 OINTMENT TOPICAL at 19:30

## 2019-11-30 RX ADMIN — ARFORMOTEROL TARTRATE SCH MCG: 15 SOLUTION RESPIRATORY (INHALATION) at 21:20

## 2019-11-30 RX ADMIN — Medication SCH ML: at 12:33

## 2019-11-30 RX ADMIN — SODIUM CHLORIDE SCH MLS/HR: 0.9 INJECTION, SOLUTION INTRAVENOUS at 15:36

## 2019-11-30 RX ADMIN — METHYLPREDNISOLONE SODIUM SUCCINATE SCH MG: 125 INJECTION, POWDER, FOR SOLUTION INTRAMUSCULAR; INTRAVENOUS at 21:43

## 2019-11-30 RX ADMIN — NITROGLYCERIN SCH INCH: 20 OINTMENT TOPICAL at 12:10

## 2019-11-30 RX ADMIN — FAMOTIDINE SCH MG: 20 TABLET ORAL at 15:26

## 2019-11-30 RX ADMIN — BUDESONIDE SCH: 0.25 INHALANT RESPIRATORY (INHALATION) at 10:15

## 2019-11-30 RX ADMIN — METHYLPREDNISOLONE SODIUM SUCCINATE SCH MG: 125 INJECTION, POWDER, FOR SOLUTION INTRAMUSCULAR; INTRAVENOUS at 05:39

## 2019-11-30 RX ADMIN — NICOTINE SCH MG: 7 PATCH TRANSDERMAL at 12:30

## 2019-11-30 RX ADMIN — NITROGLYCERIN SCH INCH: 20 OINTMENT TOPICAL at 14:34

## 2019-11-30 RX ADMIN — Medication SCH ML: at 21:45

## 2019-11-30 NOTE — PROGRESS NOTE
Assessment and Plan


Assessment and plan: 


patient 48 year old with a history of emphysema, crack cocaine abuse and tobacco

abuse presents after being [place] bridge with a failed attempt at suicide.  

Patient was threatening to jump off a bridge he ended up falling down 

approximately 4 foot embankment.  Patient was brought to the ED was found to be 

in acute distress with agonal breathing.  Patient required intubation because of

acute hypoxemia.   Patient will require psych consult and 1013 after he wakes up

and is more stable and responsive.








CT Cervical Spine: IMPRESSION: 1. No indication of fracture or traumatic ellis

bluxation. 2. Increased soft tissue fullness in the prevertebral and 

retropharyngeal region may represent retained secretions secondary to 

nasotracheal intubation, however, follow-up study is suggested to exclude other 

retropharyngeal or prevertebral pathology such as mass or hematoma. 3. Extensive

biapical bullous changes in both lung apices. 


chest xray: IMPRESSION: 1. Stable appearance of the chest. No acute Pathology


Bilateral Tib/fiB XRAY: IMPRESSION: No acute abnormality of either tibia/fibula.




CT A/P IMPRESSION: 1. Limited noncontrast exam demonstrating no acute 

abnormality of the abdomen or pelvis. 2. Additional findings as above. 


CT HEAD/BRAIN: IMPRESSION: Cranial CT scan within normal limits. Mild sinus 

disease, likely chronic.





* Extubated and doing well


* Bilateral upper ext edema improving


* awaiting pysch


* S/P Cardiac arrest with return to spontaneous respiration. 


   * ct chest negative except for stable multifocal bullea. Continue to monitor.

     


   * Monitor in ICU, OBTAIN cardiology EVALUATION AS PATIENT COMPLAINED OF CHEST

     PAIN DURING THE EPISODE, INITIAL WORK UP NEGATIVE ON TROPNIN AND CHEST 

     PAINN IS RESOLVED. 


* Previously noted pharyngeal edema is better. 


* Echo pending








- Patient Problems


(1) COPD exacerbation


Current Visit: No   Status: Acute   


Plan to address problem: 


Patient has wheezing and emphysema.  Chest x-ray consistent with emphysema.  

Most likely exacerbated by suicide attempt and crack abuse.


Pulmonary following, now extubated


Tobacco use disorder- 15 mins counselling povided





(2) Respiratory failure on mechanical ventilation for >48 hrs now extubated


Current Visit: Yes   Status: Acute   


Qualifiers: 


   Chronicity: acute   Respiratory failure complication: unspecified whether 

with hypoxia or hypercapnia   Qualified Code(s): J96.00 - Acute respiratory 

failure, unspecified whether with hypoxia or hypercapnia   


Plan to address problem: 


Respiratory failure I reviewed all CT scans head chest abdomen pelvis chest x-

ray.   The to help with underlying condition and workup.


Pulmonary following the patient


?Pharyngeal swelling, will discuss with Pulmonary about repeat imaging studies 

to better evaluate


VAP bundle


aspiration precautions


Pressure ulcer prevention method


repeat ct cervical spine





(3) Cocaine abuse


Current Visit: Yes   Status: Acute   


Plan to address problem: 


Patient is no longer tachycardic or shows any evidence of crack overdose of 

cocaine overdose at this particular time.  15 mins counselling provided





(4) Suicidal ideation


Current Visit: Yes   Status: Acute   


Plan to address problem: 


Patient with suicide attempt.   1013 


Mental health consult placed








(5)Acute metabolic Encephalopathy


Unchanged, POA,. likely secondary to Cocain toxicity and hypoxIa,


IF persist, will obtain Neurology eval in am





(6) Severe Protien Malnutrition


Current Visit: Yes   Status: Acute   


Plan to address problem: 


Most likely secondary to drug abuse patient remains cachectic.  We'll encourage 

boost once patient is extubated.





Repeat ct neck ordered


Medically cleared for discharge once cleared by psych





History


Interval history: 


Patient seen and examined, no family at bedside. this morning following imaging 

of the cspine patient had a code blue called but returned to spontaneous 

respiration, patient was transfered to ICU for closer monitoring





Hospitalist Physical





- Physical exam


Narrative exam: 


General appearance: Present: no distress cachectic, disheveled, 


- EENT


Eyes: Present: PERRL


ENT: poor dentition, 





- Neck


Neck: Present: supple, normal ROM





- Respiratory


Respiratory effort: normal


Respiratory: bilateral: CTA (bronchivesicular)





- Cardiovascular


Rhythm: regular


Heart Sounds: Present: S1 & S2.  Absent: systolic murmur





- Extremities


Extremities: no ischemia, pulses intact, pulses symmetrical, No edema, normal 

temperature, normal color, Full ROM


Peripheral Pulses: within normal limits





- Abdominal


General gastrointestinal: soft, non-tender , non-distended, normal bowel sounds





- Integumentary


Integumentary: Present: warm, bilateral upper ext edema, right Fletcher swelling, 

chronic per patient. 





- Psychiatric


Psychiatric: othe





- Neurologic


Neurologic: other





- Allied Health


Allied health notes reviewed: nursing











- Constitutional


Vitals: 


                                        











Temp Pulse Resp BP Pulse Ox


 


 99.4 F   87   14   102/52   97 


 


 11/30/19 06:07  11/30/19 15:30  11/30/19 15:30  11/30/19 15:30  11/30/19 15:30











General appearance: Present: severe distress, cachectic, disheveled, other 

(intubated sedation.)





Results





- Labs


CBC & Chem 7: 


                                 11/30/19 10:57





                                 11/30/19 10:06


Labs: 


                             Laboratory Last Values











WBC  16.6 K/mm3 (4.5-11.0)  H  11/30/19  10:06    


 


RBC  4.38 M/mm3 (3.65-5.03)   11/30/19  10:06    


 


Hgb  13.8 gm/dl (11.8-15.2)   11/30/19  10:57    


 


Hct  41.8 % (35.5-45.6)   11/30/19  10:57    


 


MCV  99 fl (84-94)  H  11/30/19  10:06    


 


MCH  33 pg (28-32)  H  11/30/19  10:06    


 


MCHC  33 % (32-34)   11/30/19  10:06    


 


RDW  12.3 % (13.2-15.2)  L  11/30/19  10:06    


 


Plt Count  325 K/mm3 (140-440)   11/30/19  10:57    


 


Lymph % (Auto)  19.9 % (13.4-35.0)   11/30/19  10:06    


 


Mono % (Auto)  11.1 % (0.0-7.3)  H  11/30/19  10:06    


 


Eos % (Auto)  0.4 % (0.0-4.3)   11/30/19  10:06    


 


Baso % (Auto)  0.3 % (0.0-1.8)   11/30/19  10:06    


 


Lymph #  3.3 K/mm3 (1.2-5.4)   11/30/19  10:06    


 


Mono #  1.8 K/mm3 (0.0-0.8)  H  11/30/19  10:06    


 


Eos #  0.1 K/mm3 (0.0-0.4)   11/30/19  10:06    


 


Baso #  0.0 K/mm3 (0.0-0.1)   11/30/19  10:06    


 


Seg Neutrophils %  68.3 % (40.0-70.0)   11/30/19  10:06    


 


Seg Neutrophils #  11.3 K/mm3 (1.8-7.7)  H  11/30/19  10:06    


 


PT  13.9 Sec. (12.2-14.9)   11/30/19  10:57    


 


INR  1.08  (0.87-1.13)   11/30/19  10:57    


 


APTT  26.4 Sec. (24.2-36.6)   11/30/19  10:57    


 


POC ABG pH  7.418  (7.35-7.45)   11/30/19  10:06    


 


POC ABG pCO2  41.4  (35-45)   11/30/19  10:06    


 


POC ABG pO2  208  ()  H  11/30/19  10:06    


 


POC ABG HCO3  26.7  (22-26 mml/L)   11/30/19  10:06    


 


POC ABG Total CO2  28  (23-27mmol/L)   11/30/19  10:06    


 


POC ABG O2 Sat  100   11/30/19  10:06    


 


POC ABG Base Excess  2  ((-2) - (+3)mmol/L)   11/30/19  10:06    


 


FiO2  50 %  11/30/19  10:06    


 


Sodium  136 mmol/L (137-145)  L  11/30/19  10:06    


 


Potassium  4.0 mmol/L (3.6-5.0)   11/30/19  10:06    


 


Chloride  101.3 mmol/L ()   11/30/19  10:06    


 


Carbon Dioxide  22 mmol/L (22-30)   11/30/19  10:06    


 


Anion Gap  17 mmol/L  11/30/19  10:06    


 


BUN  22 mg/dL (9-20)  H  11/30/19  10:06    


 


Creatinine  0.9 mg/dL (0.8-1.5)   11/30/19  10:06    


 


Estimated GFR  > 60 ml/min  11/30/19  10:06    


 


BUN/Creatinine Ratio  24 %  11/30/19  10:06    


 


Glucose  96 mg/dL ()   11/30/19  10:06    


 


POC Glucose  175  ()  H  11/30/19  09:39    


 


Hemoglobin A1c  5.3 % (4-6)   11/24/19  11:10    


 


Calcium  9.4 mg/dL (8.4-10.2)   11/30/19  10:06    


 


Phosphorus  1.80 mg/dL (2.5-4.5)  L  11/30/19  10:06    


 


Magnesium  2.20 mg/dL (1.7-2.3)   11/30/19  10:06    


 


Total Bilirubin  0.40 mg/dL (0.1-1.2)   11/25/19  04:32    


 


Direct Bilirubin  < 0.2 mg/dL (0-0.2)   11/24/19  11:10    


 


Indirect Bilirubin  0.2 mg/dL  11/24/19  11:10    


 


AST  73 units/L (5-40)  H  11/25/19  04:32    


 


ALT  56 units/L (7-56)   11/25/19  04:32    


 


Alkaline Phosphatase  82 units/L ()   11/25/19  04:32    


 


Ammonia  29.0 umol/L (25-60)   11/24/19  15:02    


 


Lactate Dehydrogenase  207 units/L ()  H  11/30/19  10:06    


 


Total Creatine Kinase  268 units/L ()  H  11/30/19  13:21    


 


CK-MB (CK-2)  1.4 ng/mL (0.0-4.0)   11/30/19  13:21    


 


CK-MB (CK-2) Rel Index  0.5  (0-4)   11/30/19  13:21    


 


Troponin T  < 0.010 ng/mL (0.00-0.029)   11/30/19  15:37    


 


NT-Pro-B Natriuret Pep  33.88 pg/mL (0-450)   11/24/19  11:10    


 


Total Protein  7.4 g/dL (6.3-8.2)   11/25/19  04:32    


 


Albumin  3.2 g/dL (3.9-5)  L  11/25/19  04:32    


 


Albumin/Globulin Ratio  0.8 %  11/25/19  04:32    


 


Amylase  438 units/L ()  H  11/24/19  19:30    


 


Lipase  8 units/L (13-60)  L  11/24/19  11:10    


 


Urine Color  Yellow  (Yellow)   11/24/19  11:59    


 


Urine Turbidity  Clear  (Clear)   11/24/19  11:59    


 


Urine pH  6.0  (5.0-7.0)   11/24/19  11:59    


 


Ur Specific Gravity  1.020  (1.003-1.030)   11/24/19  11:59    


 


Urine Protein  <15 mg/dl mg/dL (Negative)   11/24/19  11:59    


 


Urine Glucose (UA)  Neg mg/dL (Negative)   11/24/19  11:59    


 


Urine Ketones  Tr mg/dL (Negative)   11/24/19  11:59    


 


Urine Blood  Sm  (Negative)   11/24/19  11:59    


 


Urine Nitrite  Neg  (Negative)   11/24/19  11:59    


 


Urine Bilirubin  Neg  (Negative)   11/24/19  11:59    


 


Urine Urobilinogen  4.0 mg/dL (<2.0)   11/24/19  11:59    


 


Ur Leukocyte Esterase  Neg  (Negative)   11/24/19  11:59    


 


Urine WBC (Auto)  3.0 /HPF (0.0-6.0)   11/24/19  11:59    


 


Urine RBC (Auto)  25.0 /HPF (0.0-6.0)   11/24/19  11:59    


 


U Epithel Cells (Auto)  1.0 /HPF (0-13.0)   11/24/19  11:59    


 


Calcium Oxalate Crystal  Few   11/24/19  11:59    


 


Urine Mucus  Few /HPF  11/24/19  11:59    


 


Salicylates  < 0.3 mg/dL (2.8-20.0)  L  11/24/19  11:10    


 


Urine Opiates Screen  Presumptive negative   11/24/19  09:57    


 


Urine Methadone Screen  Presumptive negative   11/24/19  09:57    


 


Acetaminophen  < 5.0 ug/mL (10.0-30.0)  L  11/24/19  11:10    


 


Ur Barbiturates Screen  Presumptive negative   11/24/19  09:57    


 


Ur Phencyclidine Scrn  Presumptive negative   11/24/19  09:57    


 


Ur Amphetamines Screen  Presumptive negative   11/24/19  09:57    


 


U Benzodiazepines Scrn  Presumptive positive   11/24/19  09:57    


 


Urine Cocaine Screen  Presumptive positive   11/24/19  09:57    


 


U Marijuana (THC) Screen  Presumptive negative   11/24/19  09:57    


 


Drugs of Abuse Note  Disclamer   11/24/19  09:57    


 


Plasma/Serum Alcohol  < 0.01 % (0-0.07)   11/24/19  11:10    


 


Blood Type  O POSITIVE   11/24/19  10:05    


 


Antibody Screen  Negative   11/24/19  10:05    














Active Medications





- Current Medications


Current Medications: 














Generic Name Dose Route Start Last Admin





  Trade Name Freq  PRN Reason Stop Dose Admin


 


Acetaminophen  650 mg  11/24/19 19:14  11/28/19 13:02





  Tylenol  PO   650 mg





  Q4H PRN   Administration





  Pain MILD(1-3)/Fever >100.5/HA  


 


Albuterol  2.5 mg  11/24/19 19:53  11/30/19 10:09





  Proventil  IH   2.5 mg





  Q4HRT PRN   Administration





  Shortness Of Breath  


 


Arformoterol Tartrate  15 mcg  11/28/19 10:00  11/30/19 10:09





  Brovana Nebu  IH   15 mcg





  Q12HRT MARIE   Administration


 


Budesonide  0.5 mg  11/28/19 16:44  11/30/19 10:15





  Pulmicort  IH   Not Given





  Q12HRT MARIE  


 


Clonazepam  0.5 mg  11/26/19 22:00  11/30/19 12:10





  Klonopin  PO   0.5 mg





  BID MARIE   Administration


 


Famotidine  20 mg  11/30/19 15:00  11/30/19 15:26





  Pepcid  PO   20 mg





  QDAY MARIE   Administration


 


Hydrophilic Ointment  1 applic  11/24/19 09:47 





  Vaseline Lip Therapy  TP  





  Q2HR PRN  





  Dry Lips  


 


Heparin Sodium/Sodium Chloride  25,000 unit in 500 mls @ 16 mls/hr  11/30/19 

11:00  11/30/19 12:16





  Heparin/ 0.45% Nacl-25,000 Unit/500 Ml  IV   800 units/hr





  TITR MARIE   16 mls/hr





    Administration





  Protocol  





  800 UNITS/HR  


 


Sodium Chloride  1,000 mls @ 75 mls/hr  11/30/19 16:00  11/30/19 15:36





  Nacl 0.9% 1000 Ml  IV  12/02/19 05:19  75 mls/hr





  AS DIRECT MARIE   Administration


 


Lorazepam  2 mg  11/24/19 19:30  11/29/19 14:55





  Ativan  IV   2 mg





  Q1H PRN   Administration





  CIWA-Ar 8-15  


 


Lorazepam  4 mg  11/24/19 19:30  11/28/19 07:06





  Ativan  IV   4 mg





  Q1H PRN   Administration





  CIWA-Ar 16-25  


 


Methylprednisolone Sodium Succinate  40 mg  11/24/19 22:00  11/30/19 14:36





  Solu-Medrol  IV   40 mg





  Q8HR MARIE   Administration


 


Multi-Ingred Cream/Lotion/Oil/Oint  1 applic  11/24/19 09:47 





  Artificial Tears Ophth Oint  OU  





  Q4HR PRN  





  Dry Eye(s)  


 


Nicotine  7 mg  11/28/19 10:00  11/30/19 12:30





  Habitrol  TD   7 mg





  QDAY MARIE   Administration


 


Nitroglycerin  0.5 inch  11/30/19 12:00  11/30/19 14:34





  Nitro-Bid 2%  TP   0.5 inch





  TIDNTG MARIE   Administration





  Protocol  


 


Ondansetron HCl  4 mg  11/24/19 19:14 





  Zofran  IV  





  Q8H PRN  





  Nausea And Vomiting  


 


Sodium Chloride  10 ml  11/24/19 22:00  11/30/19 12:33





  Sodium Chloride Flush Syringe 10 Ml  IV   10 ml





  BID MARIE   Administration


 


Sodium Chloride  10 ml  11/30/19 10:00 





  Sodium Chloride Flush Syringe 10 Ml  IV  





  PRN PRN  





  LINE FLUSH  














Nutrition/Malnutrition Assess





- Dietary Evaluation


Nutrition/Malnutrition Findings: 


                                        





Nutrition Notes                                            Start:  11/25/19 

11:28


Freq:                                                      Status: Active       




Protocol:                                                                       




 Document     11/27/19 09:36  LM  (Rec: 11/27/19 09:42  LM  [email or web address])


 Nutrition Notes


     Initial or Follow up                        Reassessment


     Current Diagnosis                           COPD,Respiratory Failure,


                                                 Malnutrition


     Other Pertinent Diagnosis                   cocaine abuse, fall


     Current Diet                                Vital AF 1.2 at 60 ml/hr


     Labs/Tests                                  


     Pertinent Medications                       Solumedrol


     Height                                      5 ft 8 in


     Weight                                      59.3 kg


     Ideal Body Weight (kg)                      70.00


     BMI                                         19.8


     Subjective/Other Information                Vital AF running at 60 ml/hr.


                                                 Per RN pt is tolerating TF.


     Percent of energy/protein needs met:        99%/100%


     Burn                                        Absent


     Trauma                                      Absent


     Current % PO                                Negligible


     Minimum of two criteria                     Yes


     Body Fat Depletion                          Mild depletion (non-severe)


     Muscle Mass                                 Mild Depletion (non-severe)


     #1


      Nutrition Diagnosis                        Malnutrition


      Diagnosis Progress(for reassessment        Continues


       documentation)                            


     Is patient on ventilator?                   Yes


     Is Patient Ambulatory and/or Out of Bed     No


     REE-([facility]-confined to bed)      [phone]


     Calculation Used for Recommendations        [facility]


     Additional Notes                            Protein needs are 71-119g (1.2


                                                 -2g/kg)


                                                 Fluid needs are 1ml/kcal


 Nutrition Intervention


     Change Diet Order:                          TF


     Nutrition Support:                          Vital 1.2 at 60ml/hr


                                                 Water flush 100ml q4h


     Kcal                                        1,728


     Protein (gm)                                109


     Fluid (mL)                                  1,163


     Goal #1                                     Meet at least 80% of kcal and


                                                 protein needs via TF


     Anticipated Discharge Needs:                Unable to determine at this


                                                 time


     Follow-Up By:                               12/04/19


     Additional Comments                         F/U for TF tolerance

## 2019-11-30 NOTE — XRAY REPORT
CHEST 1 VIEW 11/30/2019 9:56 AM



INDICATION / CLINICAL INFORMATION:

hypoxia.



COMPARISON: 

11/28/2019



FINDINGS:



SUPPORT DEVICES: None.



HEART / MEDIASTINUM: Stable. 



LUNGS / PLEURA: Stable prominent bullous change with upper lobe predominance. No new consolidation. N
o appreciable pleural effusion or pneumothorax. 



ADDITIONAL FINDINGS: No significant additional findings.



IMPRESSION:

1. Stable exam with prominent bullous change in both upper lungs but no discrete pneumothorax identif
ied. No adverse change from prior.



Signer Name: Gilberto Hernández MD 

Signed: 11/30/2019 10:17 AM

 Workstation Name: [id number]

## 2019-11-30 NOTE — PROGRESS NOTE
Assessment and Plan


Assessment and plan: 


patient 48 year old with a history of emphysema, crack cocaine abuse and tobacco

abuse presents after [place] bridge with a failed attempt at suicide.  

Patient was threatening to jump off a bridge he ended up falling down 

approximately 4 foot embankment.  Patient was brought to the ED was found to be 

in acute distress with agonal breathing.  Patient required intubation because of

acute hypoxemia.   Patient will require psych consult and 1013 after he wakes up

and is more stable and responsive.








CT Cervical Spine: IMPRESSION: 1. No indication of fracture or traumatic ellis

bluxation. 2. Increased soft tissue fullness in the prevertebral and 

retropharyngeal region may represent retained secretions secondary to 

nasotracheal intubation, however, follow-up study is suggested to exclude other 

retropharyngeal or prevertebral pathology such as mass or hematoma. 3. Extensive

biapical bullous changes in both lung apices. 


chest xray: IMPRESSION: 1. Stable appearance of the chest. No acute Pathology


Bilateral Tib/fiB XRAY: IMPRESSION: No acute abnormality of either tibia/fibula.




CT A/P IMPRESSION: 1. Limited noncontrast exam demonstrating no acute 

abnormality of the abdomen or pelvis. 2. Additional findings as above. 


CT HEAD/BRAIN: IMPRESSION: Cranial CT scan within normal limits. Mild sinus 

disease, likely chronic.





* Extubated and doing well


* Bilateral upper ext edema improving


* awaiting pyshc- Medically cleared for discharge when ok with psych








- Patient Problems


(1) COPD exacerbation


Current Visit: No   Status: Acute   


Plan to address problem: 


Patient has wheezing and emphysema.  Chest x-ray consistent with emphysema.  

Most likely exacerbated by suicide attempt and crack abuse.


Pulmonary following, now extubated


Tobacco use disorder- 15 mins counselling povided





(2) Respiratory failure on mechanical ventilation for >48 hrs now extubated


Current Visit: Yes   Status: Acute   


Qualifiers: 


   Chronicity: acute   Respiratory failure complication: unspecified whether 

with hypoxia or hypercapnia   Qualified Code(s): J96.00 - Acute respiratory 

failure, unspecified whether with hypoxia or hypercapnia   


Plan to address problem: 


Respiratory failure I reviewed all CT scans head chest abdomen pelvis chest x-

ray.   The to help with underlying condition and workup.


Pulmonary following the patient


?Pharyngeal swelling, will discuss with Pulmonary about repeat imaging studies 

to better evaluate


VAP bundle


aspiration precautions


Pressure ulcer prevention method


repeat ct cervical spine





(3) Cocaine abuse


Current Visit: Yes   Status: Acute   


Plan to address problem: 


Patient is no longer tachycardic or shows any evidence of crack overdose of 

cocaine overdose at this particular time.  15 mins counselling provided





(4) Suicidal ideation


Current Visit: Yes   Status: Acute   


Plan to address problem: 


Patient with suicide attempt.   1013 


Mental health consult placed








(5)Acute metabolic Encephalopathy


Unchanged, POA,. likely secondary to Cocain toxicity and hypoxIa,


IF persist, will obtain Neurology eval in am





(6) Severe Protien Malnutrition


Current Visit: Yes   Status: Acute   


Plan to address problem: 


Most likely secondary to drug abuse patient remains cachectic.  We'll encourage 

boost once patient is extubated.





Repeat ct neck ordered


Medically cleared for discharge once cleared by psych





Hospitalist Physical





- Constitutional


Vitals: 


                                        











Temp Pulse Resp BP Pulse Ox


 


 99.4 F   90   20   122/58   96 


 


 11/30/19 06:07  11/30/19 06:07  11/30/19 06:07  11/30/19 06:07  11/30/19 06:07











General appearance: Present: severe distress, cachectic, disheveled, other 

(intubated sedation.)





Results





- Labs


CBC & Chem 7: 


                                 11/25/19 04:32





                                 11/25/19 04:32


Labs: 


                             Laboratory Last Values











WBC  4.0 K/mm3 (4.5-11.0)  L  11/25/19  04:32    


 


RBC  4.32 M/mm3 (3.65-5.03)   11/25/19  04:32    


 


Hgb  14.5 gm/dl (11.8-15.2)   11/25/19  04:32    


 


Hct  43.3 % (35.5-45.6)   11/25/19  04:32    


 


MCV  100 fl (84-94)  H  11/25/19  04:32    


 


MCH  34 pg (28-32)  H  11/25/19  04:32    


 


MCHC  33 % (32-34)   11/25/19  04:32    


 


RDW  13.4 % (13.2-15.2)   11/25/19  04:32    


 


Plt Count  371 K/mm3 (140-440)   11/25/19  04:32    


 


Lymph % (Auto)  9.6 % (13.4-35.0)  L  11/25/19  04:32    


 


Mono % (Auto)  3.4 % (0.0-7.3)   11/25/19  04:32    


 


Eos % (Auto)  0.0 % (0.0-4.3)   11/25/19  04:32    


 


Baso % (Auto)  0.6 % (0.0-1.8)   11/25/19  04:32    


 


Lymph #  0.4 K/mm3 (1.2-5.4)  L  11/25/19  04:32    


 


Mono #  0.1 K/mm3 (0.0-0.8)   11/25/19  04:32    


 


Eos #  0.0 K/mm3 (0.0-0.4)   11/25/19  04:32    


 


Baso #  0.0 K/mm3 (0.0-0.1)   11/25/19  04:32    


 


Seg Neutrophils %  86.4 % (40.0-70.0)  H  11/25/19  04:32    


 


Seg Neutrophils #  3.5 K/mm3 (1.8-7.7)   11/25/19  04:32    


 


PT  30.5 Sec. (12.2-14.9)  H  11/24/19  11:10    


 


INR  3.00  (0.87-1.13)  H  11/24/19  11:10    


 


APTT  29.7 Sec. (24.2-36.6)   11/24/19  11:10    


 


POC ABG pH  7.418  (7.35-7.45)   11/27/19  05:00    


 


POC ABG pCO2  36.8  (35-45)   11/27/19  05:00    


 


POC ABG pO2  96  ()   11/27/19  05:00    


 


POC ABG HCO3  23.8  (22-26 mml/L)   11/27/19  05:00    


 


POC ABG Total CO2  25  (23-27mmol/L)   11/27/19  05:00    


 


POC ABG O2 Sat  98   11/27/19  05:00    


 


POC ABG Base Excess  -1  ((-2) - (+3)mmol/L)   11/27/19  05:00    


 


FiO2  30 %  11/27/19  05:00    


 


Sodium  141 mmol/L (137-145)   11/25/19  04:32    


 


Potassium  4.8 mmol/L (3.6-5.0)  D 11/25/19  04:32    


 


Chloride  107.5 mmol/L ()  H  11/25/19  04:32    


 


Carbon Dioxide  23 mmol/L (22-30)   11/25/19  04:32    


 


Anion Gap  15 mmol/L  11/25/19  04:32    


 


BUN  10 mg/dL (9-20)   11/25/19  04:32    


 


Creatinine  0.9 mg/dL (0.8-1.5)   11/25/19  04:32    


 


Estimated GFR  > 60 ml/min  11/25/19  04:32    


 


BUN/Creatinine Ratio  11 %  11/25/19  04:32    


 


Glucose  118 mg/dL ()  H  11/25/19  04:32    


 


Hemoglobin A1c  5.3 % (4-6)   11/24/19  11:10    


 


Calcium  9.4 mg/dL (8.4-10.2)   11/25/19  04:32    


 


Phosphorus  2.90 mg/dL (2.5-4.5)   11/24/19  19:30    


 


Magnesium  1.70 mg/dL (1.7-2.3)   11/24/19  19:30    


 


Total Bilirubin  0.40 mg/dL (0.1-1.2)   11/25/19  04:32    


 


Direct Bilirubin  < 0.2 mg/dL (0-0.2)   11/24/19  11:10    


 


Indirect Bilirubin  0.2 mg/dL  11/24/19  11:10    


 


AST  73 units/L (5-40)  H  11/25/19  04:32    


 


ALT  56 units/L (7-56)   11/25/19  04:32    


 


Alkaline Phosphatase  82 units/L ()   11/25/19  04:32    


 


Ammonia  29.0 umol/L (25-60)   11/24/19  15:02    


 


Total Creatine Kinase  215 units/L ()  H  11/24/19  11:10    


 


CK-MB (CK-2)  4.5 ng/mL (0.0-4.0)  H  11/24/19  11:10    


 


CK-MB (CK-2) Rel Index  2.0  (0-4)   11/24/19  11:10    


 


Troponin T  < 0.010 ng/mL (0.00-0.029)   11/24/19  11:10    


 


NT-Pro-B Natriuret Pep  33.88 pg/mL (0-450)   11/24/19  11:10    


 


Total Protein  7.4 g/dL (6.3-8.2)   11/25/19  04:32    


 


Albumin  3.2 g/dL (3.9-5)  L  11/25/19  04:32    


 


Albumin/Globulin Ratio  0.8 %  11/25/19  04:32    


 


Amylase  438 units/L ()  H  11/24/19  19:30    


 


Lipase  8 units/L (13-60)  L  11/24/19  11:10    


 


Urine Color  Yellow  (Yellow)   11/24/19  11:59    


 


Urine Turbidity  Clear  (Clear)   11/24/19  11:59    


 


Urine pH  6.0  (5.0-7.0)   11/24/19  11:59    


 


Ur Specific Gravity  1.020  (1.003-1.030)   11/24/19  11:59    


 


Urine Protein  <15 mg/dl mg/dL (Negative)   11/24/19  11:59    


 


Urine Glucose (UA)  Neg mg/dL (Negative)   11/24/19  11:59    


 


Urine Ketones  Tr mg/dL (Negative)   11/24/19  11:59    


 


Urine Blood  Sm  (Negative)   11/24/19  11:59    


 


Urine Nitrite  Neg  (Negative)   11/24/19  11:59    


 


Urine Bilirubin  Neg  (Negative)   11/24/19  11:59    


 


Urine Urobilinogen  4.0 mg/dL (<2.0)   11/24/19  11:59    


 


Ur Leukocyte Esterase  Neg  (Negative)   11/24/19  11:59    


 


Urine WBC (Auto)  3.0 /HPF (0.0-6.0)   11/24/19  11:59    


 


Urine RBC (Auto)  25.0 /HPF (0.0-6.0)   11/24/19  11:59    


 


U Epithel Cells (Auto)  1.0 /HPF (0-13.0)   11/24/19  11:59    


 


Calcium Oxalate Crystal  Few   11/24/19  11:59    


 


Urine Mucus  Few /HPF  11/24/19  11:59    


 


Salicylates  < 0.3 mg/dL (2.8-20.0)  L  11/24/19  11:10    


 


Urine Opiates Screen  Presumptive negative   11/24/19  09:57    


 


Urine Methadone Screen  Presumptive negative   11/24/19  09:57    


 


Acetaminophen  < 5.0 ug/mL (10.0-30.0)  L  11/24/19  11:10    


 


Ur Barbiturates Screen  Presumptive negative   11/24/19  09:57    


 


Ur Phencyclidine Scrn  Presumptive negative   11/24/19  09:57    


 


Ur Amphetamines Screen  Presumptive negative   11/24/19  09:57    


 


U Benzodiazepines Scrn  Presumptive positive   11/24/19  09:57    


 


Urine Cocaine Screen  Presumptive positive   11/24/19  09:57    


 


U Marijuana (THC) Screen  Presumptive negative   11/24/19  09:57    


 


Drugs of Abuse Note  Disclamer   11/24/19  09:57    


 


Plasma/Serum Alcohol  < 0.01 % (0-0.07)   11/24/19  11:10    


 


Blood Type  O POSITIVE   11/24/19  10:05    


 


Antibody Screen  Negative   11/24/19  10:05    














Active Medications





- Current Medications


Current Medications: 














Generic Name Dose Route Start Last Admin





  Trade Name Freq  PRN Reason Stop Dose Admin


 


Acetaminophen  650 mg  11/24/19 19:14  11/28/19 13:02





  Tylenol  PO   650 mg





  Q4H PRN   Administration





  Pain MILD(1-3)/Fever >100.5/HA  


 


Albuterol  2.5 mg  11/24/19 19:53 





  Proventil  IH  





  Q4HRT PRN  





  Shortness Of Breath  


 


Arformoterol Tartrate  15 mcg  11/28/19 10:00  11/29/19 19:38





  Brovana Nebu  IH   15 mcg





  Q12HRT MARIE   Administration


 


Budesonide  0.5 mg  11/28/19 16:44  11/29/19 19:39





  Pulmicort  IH   0.5 mg





  Q12HRT MARIE   Administration


 


Clonazepam  0.5 mg  11/26/19 22:00  11/29/19 22:54





  Klonopin  PO   0.5 mg





  BID MARIE   Administration


 


Famotidine  20 mg  11/28/19 10:00  11/29/19 22:54





  Pepcid  PO   20 mg





  BID MARIE   Administration


 


Heparin Sodium (Porcine)  5,000 unit  11/25/19 10:00  11/29/19 22:54





  Heparin  SUB-Q   5,000 unit





  Q12HR MARIE   Administration


 


Hydrophilic Ointment  1 applic  11/24/19 09:47 





  Vaseline Lip Therapy  TP  





  Q2HR PRN  





  Dry Lips  


 


Lorazepam  2 mg  11/24/19 19:30  11/29/19 14:55





  Ativan  IV   2 mg





  Q1H PRN   Administration





  CIWA-Ar 8-15  


 


Lorazepam  4 mg  11/24/19 19:30  11/28/19 07:06





  Ativan  IV   4 mg





  Q1H PRN   Administration





  CIWA-Ar 16-25  


 


Methylprednisolone Sodium Succinate  40 mg  11/24/19 22:00  11/30/19 05:39





  Solu-Medrol  IV   40 mg





  Q8HR MARIE   Administration


 


Multi-Ingred Cream/Lotion/Oil/Oint  1 applic  11/24/19 09:47 





  Artificial Tears Ophth Oint  OU  





  Q4HR PRN  





  Dry Eye(s)  


 


Nicotine  7 mg  11/28/19 10:00  11/29/19 10:00





  Habitrol  TD   7 mg





  QDAY MARIE   Administration


 


Ondansetron HCl  4 mg  11/24/19 19:14 





  Zofran  IV  





  Q8H PRN  





  Nausea And Vomiting  


 


Sodium Chloride  10 ml  11/24/19 22:00  11/29/19 22:56





  Sodium Chloride Flush Syringe 10 Ml  IV   10 ml





  BID MARIE   Administration


 


Sodium Chloride  10 ml  11/24/19 19:14 





  Sodium Chloride Flush Syringe 10 Ml  IV  





  PRN PRN  





  LINE FLUSH  














Nutrition/Malnutrition Assess





- Dietary Evaluation


Nutrition/Malnutrition Findings: 


                                        





Nutrition Notes                                            Start:  11/25/19 11

:28


Freq:                                                      Status: Active       




Protocol:                                                                       




 Document     11/27/19 09:36  LM  (Rec: 11/27/19 09:42  LM  [id number])


 Nutrition Notes


     Initial or Follow up                        Reassessment


     Current Diagnosis                           COPD,Respiratory Failure,


                                                 Malnutrition


     Other Pertinent Diagnosis                   cocaine abuse, fall


     Current Diet                                Vital AF 1.2 at 60 ml/hr


     Labs/Tests                                  


     Pertinent Medications                       Solumedrol


     Height                                      5 ft 8 in


     Weight                                      59.3 kg


     Ideal Body Weight (kg)                      70.00


     BMI                                         19.8


     Subjective/Other Information                Vital AF running at 60 ml/hr.


                                                 Per RN pt is tolerating TF.


     Percent of energy/protein needs met:        99%/100%


     Burn                                        Absent


     Trauma                                      Absent


     Current % PO                                Negligible


     Minimum of two criteria                     Yes


     Body Fat Depletion                          Mild depletion (non-severe)


     Muscle Mass                                 Mild Depletion (non-severe)


     #1


      Nutrition Diagnosis                        Malnutrition


      Diagnosis Progress(for reassessment        Continues


       documentation)                            


     Is patient on ventilator?                   Yes


     Is Patient Ambulatory and/or Out of Bed     No


     REE-([facility]-confined to bed)      [phone]


     Calculation Used for Recommendations        [facility]


     Additional Notes                            Protein needs are 71-119g (1.2


                                                 -2g/kg)


                                                 Fluid needs are 1ml/kcal


 Nutrition Intervention


     Change Diet Order:                          TF


     Nutrition Support:                          Vital 1.2 at 60ml/hr


                                                 Water flush 100ml q4h


     Kcal                                        1,728


     Protein (gm)                                109


     Fluid (mL)                                  1,163


     Goal #1                                     Meet at least 80% of kcal and


                                                 protein needs via TF


     Anticipated Discharge Needs:                Unable to determine at this


                                                 time


     Follow-Up By:                               12/04/19


     Additional Comments                         F/U for TF tolerance

## 2019-11-30 NOTE — PROGRESS NOTE
Assessment and Plan





- Patient Problems


(1) Altered mental status


Current Visit: Yes   Status: Acute   


Qualifiers: 


   Altered mental status type: delirium   Qualified Code(s): R41.0 - 

Disorientation, unspecified   





(2) Cocaine abuse


Current Visit: Yes   Status: Acute   





(3) Fall


Current Visit: Yes   Status: Acute   


Qualifiers: 


   Encounter type: initial encounter   Qualified Code(s): W19.XXXA - Unspecified

fall, initial encounter   





(4) Respiratory failure


Current Visit: Yes   Status: Acute   


Qualifiers: 


   Chronicity: acute   Respiratory failure complication: unspecified whether 

with hypoxia or hypercapnia   Qualified Code(s): J96.00 - Acute respiratory 

failure, unspecified whether with hypoxia or hypercapnia   





(5) Suicidal ideation


Current Visit: Yes   Status: Acute   





(6) Bronchospasm


Current Visit: No   Status: Acute   





(7) Nicotine dependence unspecified, with withdrawal


Current Visit: No   Status: Acute   


Qualifiers: 


   Nicotine product type: cigarettes   Qualified Code(s): F17.213 - Nicotine 

dependence, cigarettes, with withdrawal   





(8) Polysubstance abuse


Current Visit: No   Status: Acute   





(9) Transaminitis


Current Visit: No   Status: Acute   





(10) Injury of cervical spine


Current Visit: Yes   Status: Suspected   





(11) Chest pain


Current Visit: Yes   Status: Acute   





(12) SOB (shortness of breath)


Current Visit: Yes   Status: Acute   





(13) Acute dyspnea


Current Visit: Yes   Status: Acute   





Subjective


Principal diagnosis: Acute respiratory failure


Interval history: 





sp ct of neck had episode of cp w worsening resp status. Code met then code blue

called


now in ICU





Objective


                               Vital Signs - 12hr











  11/29/19 11/29/19 11/30/19





  22:00 22:11 06:07


 


Temperature  97.5 F L 99.4 F


 


Pulse Rate 76 71 90


 


Respiratory  22 20





Rate   


 


Blood Pressure  95/53 122/58


 


O2 Sat by Pulse  89 96





Oximetry   











Constitutional: no acute distress, alert


Eyes: non-icteric


ENT: oropharynx moist


Neck: supple


Effort: normal


Ascultation: Bilateral: clear, wheezes (w/ prolonged expiratory phase)


Cardiovascular: regular rate and rhythm (no mrg)


Gastrointestinal: normoactive bowel sounds, soft, non-tender, non-distended


Integumentary: normal


Extremities: no cyanosis, no edema, pink and warm


Neurologic: other (Cannot obtain as intubated/sedated)


Psychiatric: other (Cannot obtain as intubated/sedated)


CBC and BMP: 


                                 11/25/19 04:32





                                 11/25/19 04:32


ABG, PT/INR, D-dimer: 


ABG











POC ABG pH  7.418  (7.35-7.45)   11/27/19  05:00    


 


POC ABG pCO2  36.8  (35-45)   11/27/19  05:00    


 


POC ABG pO2  96  ()   11/27/19  05:00    


 


POC ABG HCO3  23.8  (22-26 mml/L)   11/27/19  05:00    


 


POC ABG Total CO2  25  (23-27mmol/L)   11/27/19  05:00    


 


POC ABG O2 Sat  98   11/27/19  05:00    





PT/INR, D-dimer











PT  30.5 Sec. (12.2-14.9)  H  11/24/19  11:10    


 


INR  3.00  (0.87-1.13)  H  11/24/19  11:10    








Abnormal lab findings: 


                                  Abnormal Labs











  11/24/19 11/24/19 11/24/19





  11:10 11:10 11:10


 


WBC   


 


MCV  99 H  


 


MCH  34 H  


 


RDW  13.1 L  


 


Lymph % (Auto)   


 


Mono % (Auto)  12.1 H  


 


Baso % (Auto)  1.9 H  


 


Lymph #   


 


Mono #  1.2 H  


 


Baso #  0.2 H  


 


Seg Neutrophils %   


 


PT   30.5 H 


 


INR   3.00 H 


 


POC ABG pH   


 


POC ABG pCO2   


 


POC ABG pO2   


 


Chloride   


 


Carbon Dioxide    18 L


 


Glucose    103 H


 


AST    58 H


 


Total Creatine Kinase    215 H


 


CK-MB (CK-2)    4.5 H


 


Albumin    3.1 L


 


Amylase   


 


Lipase    8 L


 


Salicylates   


 


Acetaminophen   














  11/24/19 11/24/19 11/24/19





  11:10 11:10 11:33


 


WBC   


 


MCV   


 


MCH   


 


RDW   


 


Lymph % (Auto)   


 


Mono % (Auto)   


 


Baso % (Auto)   


 


Lymph #   


 


Mono #   


 


Baso #   


 


Seg Neutrophils %   


 


PT   


 


INR   


 


POC ABG pH    7.291 L


 


POC ABG pCO2    49.7 H


 


POC ABG pO2    234 H


 


Chloride   


 


Carbon Dioxide   


 


Glucose   


 


AST   


 


Total Creatine Kinase   


 


CK-MB (CK-2)   


 


Albumin   


 


Amylase   


 


Lipase   


 


Salicylates  < 0.3 L  


 


Acetaminophen   < 5.0 L 














  11/24/19 11/25/19 11/25/19





  19:30 04:18 04:32


 


WBC    4.0 L


 


MCV    100 H


 


MCH    34 H


 


RDW   


 


Lymph % (Auto)    9.6 L


 


Mono % (Auto)   


 


Baso % (Auto)   


 


Lymph #    0.4 L


 


Mono #   


 


Baso #   


 


Seg Neutrophils %    86.4 H


 


PT   


 


INR   


 


POC ABG pH   7.348 L 


 


POC ABG pCO2   


 


POC ABG pO2   108 H 


 


Chloride   


 


Carbon Dioxide   


 


Glucose   


 


AST   


 


Total Creatine Kinase   


 


CK-MB (CK-2)   


 


Albumin  3.2 L  


 


Amylase  438 H  


 


Lipase   


 


Salicylates   


 


Acetaminophen   














  11/25/19





  04:32


 


WBC 


 


MCV 


 


MCH 


 


RDW 


 


Lymph % (Auto) 


 


Mono % (Auto) 


 


Baso % (Auto) 


 


Lymph # 


 


Mono # 


 


Baso # 


 


Seg Neutrophils % 


 


PT 


 


INR 


 


POC ABG pH 


 


POC ABG pCO2 


 


POC ABG pO2 


 


Chloride  107.5 H


 


Carbon Dioxide 


 


Glucose  118 H


 


AST  73 H


 


Total Creatine Kinase 


 


CK-MB (CK-2) 


 


Albumin  3.2 L


 


Amylase 


 


Lipase 


 


Salicylates 


 


Acetaminophen 











Chest x-ray: image reviewed

## 2019-11-30 NOTE — EVENT NOTE
Was called to bedside as patient was hypoxic and unresponsive.  Patient 

maintained a pulse.


He was given oxygen by bag and mask.


Patient was actually planned for intubation and then woke up, and appeared to 

improve.  Patient was then put on BiPAP, and transferred to the ICU.  He was 

recommended for CT angiogram to rule out PE





S


Patient reports respiratory distress and shortness of breath.


Per nurses he did not receive any sedating medications,





O


On exam the patient's lungs are actually clear with diminished air entry in the 

bases.  But he does not have any crackles





A


Acute hypoxic respiratory failure


COPD exacerbation





P


Discussed with pulmonologist


Patient treated with BiPAP as needed, on Ventimask when improved


CT angiogram pending, continue steroids nebulizer treatments








Critical care time 35 minutes

## 2019-11-30 NOTE — CAT SCAN REPORT
CT cervical spine wo con



INDICATION:

pharyngeal edema.



TECHNIQUE:

All CT scans at this location are performed using the following dose modulation technique: Automated 
exposure control. Helical slices were obtained through the cervical spine. Coronal and sagittal refor
matted images were obtained. 



COMPARISON:

11/24/2019



FINDINGS:

Previously noted soft tissue fullness in the prevertebral and retrocaval pharyngeal region has improv
ed in the interval. Today's examination the prevertebral soft tissues are unremarkable.



No fracture is seen in the cervical spine. Cervical spine is in normal alignment. Disc space heights 
are maintained.



Previously noted foreign body in the right lateral neck is no longer seen.



IMPRESSION:

1. Soft tissue swelling noted previously has significantly improved. No fracture or subluxation is se
en.



Large blebs in the lung apices are again noted. 



Signer Name: Abdiel Garnica MD 

Signed: 11/30/2019 9:37 AM

 Workstation Name: [id number]

## 2019-12-01 LAB
BAND NEUTROPHILS # (MANUAL): 0.3 K/MM3
HCT VFR BLD CALC: 41 % (ref 35.5–45.6)
HGB BLD-MCNC: 13.7 GM/DL (ref 11.8–15.2)
MCHC RBC AUTO-ENTMCNC: 33 % (ref 32–34)
MCV RBC AUTO: 99 FL (ref 84–94)
MYELOCYTES # (MANUAL): 0 K/MM3
PLATELET # BLD: 328 K/MM3 (ref 140–440)
PROMYELOCYTES # (MANUAL): 0 K/MM3
RBC # BLD AUTO: 4.14 M/MM3 (ref 3.65–5.03)
TOTAL CELLS COUNTED BLD: 100

## 2019-12-01 RX ADMIN — HEPARIN SODIUM SCH: 5000 INJECTION, SOLUTION INTRAVENOUS; SUBCUTANEOUS at 07:51

## 2019-12-01 RX ADMIN — NITROGLYCERIN SCH: 20 OINTMENT TOPICAL at 12:24

## 2019-12-01 RX ADMIN — BUDESONIDE SCH MG: 0.25 INHALANT RESPIRATORY (INHALATION) at 20:07

## 2019-12-01 RX ADMIN — ARFORMOTEROL TARTRATE SCH MCG: 15 SOLUTION RESPIRATORY (INHALATION) at 20:08

## 2019-12-01 RX ADMIN — ARFORMOTEROL TARTRATE SCH MCG: 15 SOLUTION RESPIRATORY (INHALATION) at 07:58

## 2019-12-01 RX ADMIN — NICOTINE SCH MG: 7 PATCH TRANSDERMAL at 09:24

## 2019-12-01 RX ADMIN — NITROGLYCERIN SCH INCH: 20 OINTMENT TOPICAL at 06:32

## 2019-12-01 RX ADMIN — METHYLPREDNISOLONE SODIUM SUCCINATE SCH MG: 125 INJECTION, POWDER, FOR SOLUTION INTRAMUSCULAR; INTRAVENOUS at 06:31

## 2019-12-01 RX ADMIN — FAMOTIDINE SCH MG: 20 TABLET ORAL at 09:24

## 2019-12-01 RX ADMIN — METHYLPREDNISOLONE SODIUM SUCCINATE SCH MG: 125 INJECTION, POWDER, FOR SOLUTION INTRAMUSCULAR; INTRAVENOUS at 14:59

## 2019-12-01 RX ADMIN — BUDESONIDE SCH MG: 0.25 INHALANT RESPIRATORY (INHALATION) at 07:59

## 2019-12-01 RX ADMIN — Medication SCH ML: at 09:24

## 2019-12-01 RX ADMIN — SODIUM CHLORIDE SCH MLS/HR: 0.9 INJECTION, SOLUTION INTRAVENOUS at 03:07

## 2019-12-01 RX ADMIN — Medication SCH ML: at 21:58

## 2019-12-01 RX ADMIN — NITROGLYCERIN SCH: 20 OINTMENT TOPICAL at 21:59

## 2019-12-01 RX ADMIN — METHYLPREDNISOLONE SODIUM SUCCINATE SCH MG: 125 INJECTION, POWDER, FOR SOLUTION INTRAMUSCULAR; INTRAVENOUS at 21:58

## 2019-12-01 NOTE — VASCULAR LAB REPORT
DUPLEX DOPPLER LOWER EXTREMITY VEINS, BILATERAL



INDICATION:

LE edema.



TECHNIQUE:

Duplex doppler imaging was performed through the veins of both lower extremities using venous albino
joseph and other maneuvers.



COMPARISON: 

No relevant prior imaging study available.



FINDINGS:

Right Common femoral vein: Negative.

Right Superficial femoral vein: Negative.

Right Popliteal vein: Negative.

Right Calf veins: Negative.



Left Common femoral vein: Negative.

Left Superficial femoral vein: Negative.

Left Popliteal vein: Negative.

Left Calf veins: Negative.



Additional findings: None..



IMPRESSION:

1. No sonographic evidence for DVT in either lower extremity.



Signer Name: Roque Whyte MD 

Signed: 12/1/2019 2:40 PM

 Workstation Name: [id number]

## 2019-12-01 NOTE — PROGRESS NOTE
Assessment and Plan


Assessment and plan: 


patient 48 year old with a history of emphysema, crack cocaine abuse and tobacco

abuse presents after being [place] bridge with a failed attempt at suicide.  

Patient was threatening to jump off a bridge he ended up falling down 

approximately 4 foot embankment.  Patient was brought to the ED was found to be 

in acute distress with agonal breathing.  Patient required intubation because of

acute hypoxemia.   Patient will require psych consult and 1013 after he wakes up

and is more stable and responsive.








CT Cervical Spine: IMPRESSION: 1. No indication of fracture or traumatic ellis

bluxation. 2. Increased soft tissue fullness in the prevertebral and 

retropharyngeal region may represent retained secretions secondary to 

nasotracheal intubation, however, follow-up study is suggested to exclude other 

retropharyngeal or prevertebral pathology such as mass or hematoma. 3. Extensive

biapical bullous changes in both lung apices. 


chest xray: IMPRESSION: 1. Stable appearance of the chest. No acute Pathology


Bilateral Tib/fiB XRAY: IMPRESSION: No acute abnormality of either tibia/fibula.




CT A/P IMPRESSION: 1. Limited noncontrast exam demonstrating no acute 

abnormality of the abdomen or pelvis. 2. Additional findings as above. 


CT HEAD/BRAIN: IMPRESSION: Cranial CT scan within normal limits. Mild sinus 

disease, likely chronic.





Repeat CT Cervical spine- prior noted pharyngeal edema is resolving. 





* Extubated and doing well


* Bilateral upper ext edema improving


* awaiting pysch


* S/P syncope with return to spontaneous respiration. 


   * ct chest negative except for stable multifocal bullea. Continue to monitor.

     


   * Monitor in ICU, OBTAIN cardiology EVALUATION AS PATIENT COMPLAINED OF CHEST

     PAIN DURING THE EPISODE, INITIAL WORK UP NEGATIVE ON TROPNIN AND CHEST PAIN

     IS RESOLVED. 


* Previously noted pharyngeal edema is better. 


* Echo pending


* Overnight 12/1/19- c/o of chest pain, improved with Nitro. Defer to cardiology

  for possible Stress test. Can transfer to Tele today


* Possible underlying Anxiety, Now agreeable to Drug Rehab on Discharge. 








- Patient Problems





Chest pain with resultant Syncope


Complained of chest pain then became unreponsive, no evidence of loss of pulse, 

spontaneously woke up.


Cardiology consulted


Will transfer to Tele and defer to cardiology for possible stress test


CTA chest negative for PE


Continue Nitro





COPD exacerbation


Current Visit: No   Status: Acute   


Plan to address problem: 


Patient has wheezing and emphysema.  Chest x-ray consistent with emphysema.  

Most likely exacerbated by suicide attempt and crack abuse.


Pulmonary following, now extubated


Tobacco use disorder- 15 mins counselling povided





Respiratory failure on mechanical ventilation for >48 hrs now extubated


Current Visit: Yes   Status: Acute   


Qualifiers: 


   Chronicity: acute   Respiratory failure complication: unspecified whether 

with hypoxia or hypercapnia   Qualified Code(s): J96.00 - Acute respiratory 

failure, unspecified whether with hypoxia or hypercapnia   


Plan to address problem: 


Respiratory failure I reviewed all CT scans head chest abdomen pelvis chest 

x-ray.   The to help with underlying condition and workup.


Pulmonary following the patient


?Pharyngeal swelling, resolved.on repeat imaging





Cocaine abuse


Current Visit: Yes   Status: Acute   


Plan to address problem: 


Patient is no longer tachycardic or shows any evidence of crack overdose of 

cocaine overdose at this particular time.  15 mins counselling provided





Leukocytosis


Likely reactive vs solumedrol induced. Continue to monitor








Suicidal ideation


Current Visit: Yes   Status: Acute   


Plan to address problem: 


Patient with suicide attempt.   1013 


Mental health consult placed





Acute metabolic Encephalopathy


Resolved





Severe Protein Malnutrition


Current Visit: Yes   Status: Acute   


Plan to address problem: 


Most likely secondary to drug abuse patient remains cachectic.  We'll encourage 

boost once patient is extubated.





DVT/GI PROPHY


Discussed with Pulmonologist and patient





The high probability of a clinically significant, sudden or life threatening 

deterioration of the [cardiac] system(s) required my full and direct attention, 

intervention and personal management. The aggregate critical care time was [35] 

minutes. This time is in addition to time spent performing reported procedures 

but includes the following: 





[x] Data Review and interpretation 





[x] Patient assessment and monitoring of vital signs 





[x] Documentation 





[x] Medication orders and management





History


Interval history: 


Patient seen and examined, no family at bedside. doing well this am, still with 

intermittent chest pain, work up so far is negative





Hospitalist Physical





- Physical exam


Narrative exam: 


General appearance: Present: no distress cachectic,


- EENT


Eyes: Present: PERRL


ENT: poor dentition, 





- Neck


Neck: Present: supple, normal ROM





- Respiratory


Respiratory effort: normal


Respiratory: bilateral: CTA (bronchivesicular)





- Cardiovascular


Rhythm: regular


Heart Sounds: Present: S1 & S2.  Absent: systolic murmur





- Extremities


Extremities: no ischemia, pulses intact, pulses symmetrical, No edema, normal t

emperature, normal color, Full ROM


Peripheral Pulses: within normal limits





- Abdominal


General gastrointestinal: soft, non-tender , non-distended, normal bowel sounds





- Integumentary


Integumentary: Present: warm, bilateral upper ext edema, right Fletcher swelling, 

chronic per patient. 





- Psychiatric


Psychiatric: othe





- Neurologic


Neurologic: other





- Allied Health


Allied health notes reviewed: nursing











- Constitutional


Vitals: 


                                        











Temp Pulse Resp BP Pulse Ox


 


 98.0 F   74   18   103/58   94 


 


 12/01/19 04:07  12/01/19 09:01  12/01/19 09:01  12/01/19 09:01  12/01/19 09:01











General appearance: Present: severe distress, cachectic, disheveled, other 

(intubated sedation.)





Results





- Labs


CBC & Chem 7: 


                                 12/01/19 09:32





                                 11/30/19 10:06


Labs: 


                             Laboratory Last Values











WBC  16.6 K/mm3 (4.5-11.0)  H  11/30/19  10:06    


 


RBC  4.38 M/mm3 (3.65-5.03)   11/30/19  10:06    


 


Hgb  13.8 gm/dl (11.8-15.2)   11/30/19  10:57    


 


Hct  41.8 % (35.5-45.6)   11/30/19  10:57    


 


MCV  99 fl (84-94)  H  11/30/19  10:06    


 


MCH  33 pg (28-32)  H  11/30/19  10:06    


 


MCHC  33 % (32-34)   11/30/19  10:06    


 


RDW  12.3 % (13.2-15.2)  L  11/30/19  10:06    


 


Plt Count  325 K/mm3 (140-440)   11/30/19  10:57    


 


Lymph % (Auto)  19.9 % (13.4-35.0)   11/30/19  10:06    


 


Mono % (Auto)  11.1 % (0.0-7.3)  H  11/30/19  10:06    


 


Eos % (Auto)  0.4 % (0.0-4.3)   11/30/19  10:06    


 


Baso % (Auto)  0.3 % (0.0-1.8)   11/30/19  10:06    


 


Lymph #  3.3 K/mm3 (1.2-5.4)   11/30/19  10:06    


 


Mono #  1.8 K/mm3 (0.0-0.8)  H  11/30/19  10:06    


 


Eos #  0.1 K/mm3 (0.0-0.4)   11/30/19  10:06    


 


Baso #  0.0 K/mm3 (0.0-0.1)   11/30/19  10:06    


 


Seg Neutrophils %  68.3 % (40.0-70.0)   11/30/19  10:06    


 


Seg Neutrophils #  11.3 K/mm3 (1.8-7.7)  H  11/30/19  10:06    


 


PT  13.9 Sec. (12.2-14.9)   11/30/19  10:57    


 


INR  1.08  (0.87-1.13)   11/30/19  10:57    


 


APTT  26.4 Sec. (24.2-36.6)   11/30/19  10:57    


 


Heparin Anti-Xa Level  0.17 U.I./ml (0.3-0.7)  L  11/30/19  19:20    


 


POC ABG pH  7.418  (7.35-7.45)   11/30/19  10:06    


 


POC ABG pCO2  41.4  (35-45)   11/30/19  10:06    


 


POC ABG pO2  208  ()  H  11/30/19  10:06    


 


POC ABG HCO3  26.7  (22-26 mml/L)   11/30/19  10:06    


 


POC ABG Total CO2  28  (23-27mmol/L)   11/30/19  10:06    


 


POC ABG O2 Sat  100   11/30/19  10:06    


 


POC ABG Base Excess  2  ((-2) - (+3)mmol/L)   11/30/19  10:06    


 


FiO2  50 %  11/30/19  10:06    


 


Sodium  136 mmol/L (137-145)  L  11/30/19  10:06    


 


Potassium  4.0 mmol/L (3.6-5.0)   11/30/19  10:06    


 


Chloride  101.3 mmol/L ()   11/30/19  10:06    


 


Carbon Dioxide  22 mmol/L (22-30)   11/30/19  10:06    


 


Anion Gap  17 mmol/L  11/30/19  10:06    


 


BUN  22 mg/dL (9-20)  H  11/30/19  10:06    


 


Creatinine  0.9 mg/dL (0.8-1.5)   11/30/19  10:06    


 


Estimated GFR  > 60 ml/min  11/30/19  10:06    


 


BUN/Creatinine Ratio  24 %  11/30/19  10:06    


 


Glucose  96 mg/dL ()   11/30/19  10:06    


 


POC Glucose  175  ()  H  11/30/19  09:39    


 


Hemoglobin A1c  5.3 % (4-6)   11/24/19  11:10    


 


Calcium  9.4 mg/dL (8.4-10.2)   11/30/19  10:06    


 


Phosphorus  1.80 mg/dL (2.5-4.5)  L  11/30/19  10:06    


 


Magnesium  2.20 mg/dL (1.7-2.3)   11/30/19  10:06    


 


Total Bilirubin  0.40 mg/dL (0.1-1.2)   11/25/19  04:32    


 


Direct Bilirubin  < 0.2 mg/dL (0-0.2)   11/24/19  11:10    


 


Indirect Bilirubin  0.2 mg/dL  11/24/19  11:10    


 


AST  73 units/L (5-40)  H  11/25/19  04:32    


 


ALT  56 units/L (7-56)   11/25/19  04:32    


 


Alkaline Phosphatase  82 units/L ()   11/25/19  04:32    


 


Ammonia  29.0 umol/L (25-60)   11/24/19  15:02    


 


Lactate Dehydrogenase  207 units/L ()  H  11/30/19  10:06    


 


Total Creatine Kinase  268 units/L ()  H  11/30/19  13:21    


 


CK-MB (CK-2)  1.4 ng/mL (0.0-4.0)   11/30/19  13:21    


 


CK-MB (CK-2) Rel Index  0.5  (0-4)   11/30/19  13:21    


 


Troponin T  < 0.010 ng/mL (0.00-0.029)   11/30/19  15:37    


 


NT-Pro-B Natriuret Pep  33.88 pg/mL (0-450)   11/24/19  11:10    


 


Total Protein  7.4 g/dL (6.3-8.2)   11/25/19  04:32    


 


Albumin  3.2 g/dL (3.9-5)  L  11/25/19  04:32    


 


Albumin/Globulin Ratio  0.8 %  11/25/19  04:32    


 


Amylase  438 units/L ()  H  11/24/19  19:30    


 


Lipase  8 units/L (13-60)  L  11/24/19  11:10    


 


Urine Color  Yellow  (Yellow)   11/24/19  11:59    


 


Urine Turbidity  Clear  (Clear)   11/24/19  11:59    


 


Urine pH  6.0  (5.0-7.0)   11/24/19  11:59    


 


Ur Specific Gravity  1.020  (1.003-1.030)   11/24/19  11:59    


 


Urine Protein  <15 mg/dl mg/dL (Negative)   11/24/19  11:59    


 


Urine Glucose (UA)  Neg mg/dL (Negative)   11/24/19  11:59    


 


Urine Ketones  Tr mg/dL (Negative)   11/24/19  11:59    


 


Urine Blood  Sm  (Negative)   11/24/19  11:59    


 


Urine Nitrite  Neg  (Negative)   11/24/19  11:59    


 


Urine Bilirubin  Neg  (Negative)   11/24/19  11:59    


 


Urine Urobilinogen  4.0 mg/dL (<2.0)   11/24/19  11:59    


 


Ur Leukocyte Esterase  Neg  (Negative)   11/24/19  11:59    


 


Urine WBC (Auto)  3.0 /HPF (0.0-6.0)   11/24/19  11:59    


 


Urine RBC (Auto)  25.0 /HPF (0.0-6.0)   11/24/19  11:59    


 


U Epithel Cells (Auto)  1.0 /HPF (0-13.0)   11/24/19  11:59    


 


Calcium Oxalate Crystal  Few   11/24/19  11:59    


 


Urine Mucus  Few /HPF  11/24/19  11:59    


 


Salicylates  < 0.3 mg/dL (2.8-20.0)  L  11/24/19  11:10    


 


Urine Opiates Screen  Presumptive negative   11/24/19  09:57    


 


Urine Methadone Screen  Presumptive negative   11/24/19  09:57    


 


Acetaminophen  < 5.0 ug/mL (10.0-30.0)  L  11/24/19  11:10    


 


Ur Barbiturates Screen  Presumptive negative   11/24/19  09:57    


 


Ur Phencyclidine Scrn  Presumptive negative   11/24/19  09:57    


 


Ur Amphetamines Screen  Presumptive negative   11/24/19  09:57    


 


U Benzodiazepines Scrn  Presumptive positive   11/24/19  09:57    


 


Urine Cocaine Screen  Presumptive positive   11/24/19  09:57    


 


U Marijuana (THC) Screen  Presumptive negative   11/24/19  09:57    


 


Drugs of Abuse Note  Disclamer   11/24/19  09:57    


 


Plasma/Serum Alcohol  < 0.01 % (0-0.07)   11/24/19  11:10    


 


Blood Type  O POSITIVE   11/24/19  10:05    


 


Antibody Screen  Negative   11/24/19  10:05    














Active Medications





- Current Medications


Current Medications: 














Generic Name Dose Route Start Last Admin





  Trade Name Freq  PRN Reason Stop Dose Admin


 


Acetaminophen  650 mg  11/24/19 19:14  11/28/19 13:02





  Tylenol  PO   650 mg





  Q4H PRN   Administration





  Pain MILD(1-3)/Fever >100.5/HA  


 


Albuterol  2.5 mg  11/24/19 19:53  11/30/19 10:09





  Proventil  IH   2.5 mg





  Q4HRT PRN   Administration





  Shortness Of Breath  


 


Arformoterol Tartrate  15 mcg  11/28/19 10:00  12/01/19 07:58





  Brovana Nebu  IH   15 mcg





  Q12HRT MARIE   Administration


 


Budesonide  0.5 mg  11/28/19 16:44  12/01/19 07:59





  Pulmicort  IH   0.5 mg





  Q12HRT MARIE   Administration


 


Clonazepam  0.5 mg  11/26/19 22:00  11/30/19 21:44





  Klonopin  PO   0.5 mg





  BID MARIE   Administration


 


Famotidine  20 mg  11/30/19 15:00  11/30/19 15:26





  Pepcid  PO   20 mg





  QDAY MARIE   Administration


 


Hydrophilic Ointment  1 applic  11/24/19 09:47 





  Vaseline Lip Therapy  TP  





  Q2HR PRN  





  Dry Lips  


 


Sodium Chloride  1,000 mls @ 75 mls/hr  11/30/19 16:00  12/01/19 03:07





  Nacl 0.9% 1000 Ml  IV  12/02/19 05:19  75 mls/hr





  AS DIRECT MARIE   Administration


 


Lorazepam  2 mg  11/24/19 19:30  11/29/19 14:55





  Ativan  IV   2 mg





  Q1H PRN   Administration





  CIWA-Ar 8-15  


 


Lorazepam  4 mg  11/24/19 19:30  11/28/19 07:06





  Ativan  IV   4 mg





  Q1H PRN   Administration





  CIWA-Ar 16-25  


 


Methylprednisolone Sodium Succinate  40 mg  11/24/19 22:00  12/01/19 06:31





  Solu-Medrol  IV   40 mg





  Q8HR MARIE   Administration


 


Multi-Ingred Cream/Lotion/Oil/Oint  1 applic  11/24/19 09:47 





  Artificial Tears Ophth Oint  OU  





  Q4HR PRN  





  Dry Eye(s)  


 


Nicotine  7 mg  11/28/19 10:00  11/30/19 12:30





  Habitrol  TD   7 mg





  QDAY MARIE   Administration


 


Nitroglycerin  0.5 inch  11/30/19 12:00  12/01/19 06:32





  Nitro-Bid 2%  TP   0.5 inch





  TIDNTG MARIE   Administration





  Protocol  


 


Ondansetron HCl  4 mg  11/24/19 19:14 





  Zofran  IV  





  Q8H PRN  





  Nausea And Vomiting  


 


Sodium Chloride  10 ml  11/24/19 22:00  11/30/19 21:45





  Sodium Chloride Flush Syringe 10 Ml  IV   10 ml





  BID MARIE   Administration


 


Sodium Chloride  10 ml  11/30/19 10:00 





  Sodium Chloride Flush Syringe 10 Ml  IV  





  PRN PRN  





  LINE FLUSH  














Nutrition/Malnutrition Assess





- Dietary Evaluation


Nutrition/Malnutrition Findings: 


                                        





Nutrition Notes                                            Start:  11/25/19 

11:28


Freq:                                                      Status: Active       

 


Protocol:                                                                       

 


 Document     11/27/19 09:36  LM  (Rec: 11/27/19 09:42  LM  [id number])


 Nutrition Notes


     Initial or Follow up                        Reassessment


     Current Diagnosis                           COPD,Respiratory Failure,


                                                 Malnutrition


     Other Pertinent Diagnosis                   cocaine abuse, fall


     Current Diet                                Vital AF 1.2 at 60 ml/hr


     Labs/Tests                                  


     Pertinent Medications                       Solumedrol


     Height                                      5 ft 8 in


     Weight                                      59.3 kg


     Ideal Body Weight (kg)                      70.00


     BMI                                         19.8


     Subjective/Other Information                Vital AF running at 60 ml/hr.


                                                 Per RN pt is tolerating TF.


     Percent of energy/protein needs met:        99%/100%


     Burn                                        Absent


     Trauma                                      Absent


     Current % PO                                Negligible


     Minimum of two criteria                     Yes


     Body Fat Depletion                          Mild depletion (non-severe)


     Muscle Mass                                 Mild Depletion (non-severe)


     #1


      Nutrition Diagnosis                        Malnutrition


      Diagnosis Progress(for reassessment        Continues


       documentation)                            


     Is patient on ventilator?                   Yes


     Is Patient Ambulatory and/or Out of Bed     No


     REE-([place]-confined to bed)      [phone]


     Calculation Used for Recommendations        [facility]


     Additional Notes                            Protein needs are 71-119g (1.2


                                                 -2g/kg)


                                                 Fluid needs are 1ml/kcal


 Nutrition Intervention


     Change Diet Order:                          TF


     Nutrition Support:                          Vital 1.2 at 60ml/hr


                                                 Water flush 100ml q4h


     Kcal                                        1,728


     Protein (gm)                                109


     Fluid (mL)                                  1,163


     Goal #1                                     Meet at least 80% of kcal and


                                                 protein needs via TF


     Anticipated Discharge Needs:                Unable to determine at this


                                                 time


     Follow-Up By:                               12/04/19


     Additional Comments                         F/U for TF tolerance

## 2019-12-01 NOTE — CONSULTATION
History of Present Illness


Consult date: 12/01/19


Consult reason: chest pain


History of present illness: 





Impression





Noncardiac chest pain, pleuritic in nature


Normal Echo EF 55-60%


Polysubstance abuse


COPD exacerbation


s/p resp failure secondayr to COPD


Suicidal ideation





Plan


Watchful waiting for now


CV stable at this time


Supportive care for now, will readdress


iscehmic w/u when other medical issues resolve





Past History


Past Medical History: COPD, other (polysubstance abuse)


Past Surgical History: Other (Unable to obtain )


Social history: other (Unable to obtain )


Family history: other (Cannot obtain as intubated/sedated)





Medications and Allergies


                                    Allergies











Allergy/AdvReac Type Severity Reaction Status Date / Time


 


No Known Allergies Allergy   Verified 11/24/19 10:08











                                Home Medications











 Medication  Instructions  Recorded  Confirmed  Last Taken  Type


 


Prednisone [predniSONE 10 mg 10 mg PO .TAPER #1 tab.ds.pk 11/29/19  Unknown Rx





(6-Day Pack, 21 Tabs)]     











Active Meds: 


Active Medications





Acetaminophen (Tylenol)  650 mg PO Q4H PRN


   PRN Reason: Pain MILD(1-3)/Fever >100.5/HA


   Last Admin: 11/28/19 13:02 Dose:  650 mg


   Documented by: 


Albuterol (Proventil)  2.5 mg IH Q4HRT PRN


   PRN Reason: Shortness Of Breath


   Last Admin: 11/30/19 10:09 Dose:  2.5 mg


   Documented by: 


Arformoterol Tartrate (Brovana Nebu)  15 mcg IH Q12HRT [facility]


   Last Admin: 12/01/19 07:58 Dose:  15 mcg


   Documented by: 


Budesonide (Pulmicort)  0.5 mg IH Q12HRT [facility]


   Last Admin: 12/01/19 07:59 Dose:  0.5 mg


   Documented by: 


Clonazepam (Klonopin)  0.5 mg PO BID [facility]


   Last Admin: 12/01/19 09:24 Dose:  0.5 mg


   Documented by: 


Famotidine (Pepcid)  20 mg PO QDAY [facility]


   Last Admin: 12/01/19 09:24 Dose:  20 mg


   Documented by: 


Hydrophilic Ointment (Vaseline Lip Therapy)  1 applic TP Q2HR PRN


   PRN Reason: Dry Lips


Sodium Chloride (Nacl 0.9% 1000 Ml)  1,000 mls @ 75 mls/hr IV AS DIRECT [facility]


   Stop: 12/02/19 05:19


   Last Admin: 12/01/19 03:07 Dose:  75 mls/hr


   Documented by: 


Lorazepam (Ativan)  2 mg IV Q1H PRN


   PRN Reason: CIWA-Ar 8-15


   Last Admin: 11/29/19 14:55 Dose:  2 mg


   Documented by: 


Lorazepam (Ativan)  4 mg IV Q1H PRN


   PRN Reason: CIWA-Ar 16-25


   Last Admin: 11/28/19 07:06 Dose:  4 mg


   Documented by: 


Methylprednisolone Sodium Succinate (Solu-Medrol)  40 mg IV Q8HR [facility]


   Last Admin: 12/01/19 06:31 Dose:  40 mg


   Documented by: 


Multi-Ingred Cream/Lotion/Oil/Oint (Artificial Tears Ophth Oint)  1 applic OU 

Q4HR PRN


   PRN Reason: Dry Eye(s)


Nicotine (Habitrol)  7 mg TD QDAY [facility]


   Last Admin: 12/01/19 09:24 Dose:  7 mg


   Documented by: 


Nitroglycerin (Nitro-Bid 2%)  0.5 inch TP TIDNTG [facility]; Protocol


   Last Admin: 12/01/19 06:32 Dose:  0.5 inch


   Documented by: 


Ondansetron HCl (Zofran)  4 mg IV Q8H PRN


   PRN Reason: Nausea And Vomiting


Sodium Chloride (Sodium Chloride Flush Syringe 10 Ml)  10 ml IV BID [facility]


   Last Admin: 12/01/19 09:24 Dose:  10 ml


   Documented by: 


Sodium Chloride (Sodium Chloride Flush Syringe 10 Ml)  10 ml IV PRN PRN


   PRN Reason: LINE FLUSH











Review of Systems


All systems: negative (stated in HPI)





Physical Examination


                                   Vital Signs











Temp Pulse Resp BP Pulse Ox


 


 98 F   110 H  16   98/49   96 


 


 11/24/19 09:58  11/24/19 09:58  11/24/19 09:58  11/24/19 09:58  11/24/19 09:58











General appearance: no acute distress


HEENT: Positive: PERRL, EOMI


Neck: Positive: neck supple


Cardiac: Positive: Reg Rate and Rhythm, S1/S2


Lungs: Positive: Normal Exam


Neuro: Positive: Grossly Intact


Abdomen: Negative: Pulsations/Bruits


Extremities: Absent: edema





Results





                                 11/30/19 10:57





                                 11/30/19 10:06


                                 Cardiac Enzymes











  11/30/19 11/30/19 Range/Units





  10:06 13:21 


 


Lactate Dehydrogenase  207 H   ()  units/L


 


CK-MB (CK-2)  1.5  1.4  (0.0-4.0)  ng/mL








                                   Coagulation











  11/30/19 Range/Units





  10:57 


 


PT  13.9  (12.2-14.9)  Sec.


 


INR  1.08  (0.87-1.13)  


 


APTT  26.4  (24.2-36.6)  Sec.








                                       CBC











  11/30/19 11/30/19 Range/Units





  10:06 10:57 


 


WBC  16.6 H   (4.5-11.0)  K/mm3


 


RBC  4.38   (3.65-5.03)  M/mm3


 


Hgb  14.3  13.8  (11.8-15.2)  gm/dl


 


Hct  43.4  41.8  (35.5-45.6)  %


 


Plt Count  342  325  (140-440)  K/mm3


 


Lymph #  3.3   (1.2-5.4)  K/mm3


 


Mono #  1.8 H   (0.0-0.8)  K/mm3


 


Eos #  0.1   (0.0-0.4)  K/mm3


 


Baso #  0.0   (0.0-0.1)  K/mm3








                          Comprehensive Metabolic Panel











  11/30/19 Range/Units





  10:06 


 


Sodium  136 L  (137-145)  mmol/L


 


Potassium  4.0  (3.6-5.0)  mmol/L


 


Chloride  101.3  ()  mmol/L


 


Carbon Dioxide  22  (22-30)  mmol/L


 


BUN  22 H  (9-20)  mg/dL


 


Creatinine  0.9  (0.8-1.5)  mg/dL


 


Glucose  96  ()  mg/dL


 


Calcium  9.4  (8.4-10.2)  mg/dL

## 2019-12-01 NOTE — PROGRESS NOTE
Assessment and Plan





- Patient Problems


(1) Altered mental status


Current Visit: Yes   Status: Acute   


Qualifiers: 


   Altered mental status type: delirium   Qualified Code(s): R41.0 - 

Disorientation, unspecified   





(2) Cocaine abuse


Current Visit: Yes   Status: Acute   





(3) Fall


Current Visit: Yes   Status: Acute   


Qualifiers: 


   Encounter type: initial encounter   Qualified Code(s): W19.XXXA - Unspecified

fall, initial encounter   





(4) Respiratory failure


Current Visit: Yes   Status: Acute   


Qualifiers: 


   Chronicity: acute   Respiratory failure complication: unspecified whether 

with hypoxia or hypercapnia   Qualified Code(s): J96.00 - Acute respiratory 

failure, unspecified whether with hypoxia or hypercapnia   





(5) Suicidal ideation


Current Visit: Yes   Status: Acute   





(6) Bronchospasm


Current Visit: No   Status: Acute   





(7) Nicotine dependence unspecified, with withdrawal


Current Visit: No   Status: Acute   


Qualifiers: 


   Nicotine product type: cigarettes   Qualified Code(s): F17.213 - Nicotine 

dependence, cigarettes, with withdrawal   





(8) Polysubstance abuse


Current Visit: No   Status: Acute   





(9) Transaminitis


Current Visit: No   Status: Acute   





(10) Injury of cervical spine


Current Visit: Yes   Status: Suspected   





(11) Chest pain


Current Visit: Yes   Status: Acute   





(12) SOB (shortness of breath)


Current Visit: Yes   Status: Acute   





(13) Acute dyspnea


Current Visit: Yes   Status: Acute   





Subjective


Principal diagnosis: Acute respiratory failure


Interval history: 





2 more episodes of chest pm yesterday.


Last one treated w NTG


ct and CE noted


feels better





Objective


                               Vital Signs - 12hr











  11/30/19 11/30/19 11/30/19





  22:00 22:30 23:00


 


Temperature   


 


Pulse Rate 88 75 91 H


 


Pulse Rate [   





Anterior   





Bilateral   





Throughout]   


 


Pulse Rate [   





From Monitor]   


 


Respiratory 17 15 18





Rate   


 


Respiratory   





Rate [Anterior   





Bilateral   





Throughout]   


 


Blood Pressure 97/50 97/50 94/53


 


O2 Sat by Pulse 96 98 98





Oximetry   














  11/30/19 11/30/19 12/01/19





  23:30 23:31 00:00


 


Temperature 98.3 F  


 


Pulse Rate   70


 


Pulse Rate [   





Anterior   





Bilateral   





Throughout]   


 


Pulse Rate [   63





From Monitor]   


 


Respiratory   18





Rate   


 


Respiratory   





Rate [Anterior   





Bilateral   





Throughout]   


 


Blood Pressure  94/53 85/51


 


O2 Sat by Pulse  95 96





Oximetry   














  12/01/19 12/01/19 12/01/19





  00:31 01:00 01:31


 


Temperature   


 


Pulse Rate  71 67


 


Pulse Rate [   





Anterior   





Bilateral   





Throughout]   


 


Pulse Rate [   





From Monitor]   


 


Respiratory  16 21





Rate   


 


Respiratory   





Rate [Anterior   





Bilateral   





Throughout]   


 


Blood Pressure 88/49 97/49 97/49


 


O2 Sat by Pulse 95 95 88





Oximetry   














  12/01/19 12/01/19 12/01/19





  02:00 02:31 03:00


 


Temperature   


 


Pulse Rate 64 65 


 


Pulse Rate [   





Anterior   





Bilateral   





Throughout]   


 


Pulse Rate [   





From Monitor]   


 


Respiratory 16 15 





Rate   


 


Respiratory   





Rate [Anterior   





Bilateral   





Throughout]   


 


Blood Pressure 93/44 93/44 92/46


 


O2 Sat by Pulse 91 89 92





Oximetry   














  12/01/19 12/01/19 12/01/19





  03:30 04:00 04:07


 


Temperature   98.0 F


 


Pulse Rate 90  


 


Pulse Rate [   





Anterior   





Bilateral   





Throughout]   


 


Pulse Rate [  63 





From Monitor]   


 


Respiratory 14 18 





Rate   


 


Respiratory   





Rate [Anterior   





Bilateral   





Throughout]   


 


Blood Pressure 92/46 99/52 


 


O2 Sat by Pulse 92 94 





Oximetry   














  12/01/19 12/01/19 12/01/19





  04:31 05:00 05:31


 


Temperature   


 


Pulse Rate 75 67 71


 


Pulse Rate [   





Anterior   





Bilateral   





Throughout]   


 


Pulse Rate [   





From Monitor]   


 


Respiratory 14 16 18





Rate   


 


Respiratory   





Rate [Anterior   





Bilateral   





Throughout]   


 


Blood Pressure 99/52 89/38 89/38


 


O2 Sat by Pulse 95 93 94





Oximetry   














  12/01/19 12/01/19 12/01/19





  06:00 06:31 06:32


 


Temperature   


 


Pulse Rate 69 69 77


 


Pulse Rate [   





Anterior   





Bilateral   





Throughout]   


 


Pulse Rate [   





From Monitor]   


 


Respiratory 20 17 





Rate   


 


Respiratory   





Rate [Anterior   





Bilateral   





Throughout]   


 


Blood Pressure 91/28 91/28 91/28


 


O2 Sat by Pulse 93 94 





Oximetry   














  12/01/19 12/01/19 12/01/19





  07:01 07:31 07:58


 


Temperature   


 


Pulse Rate 67 65 


 


Pulse Rate [   





Anterior   





Bilateral   





Throughout]   


 


Pulse Rate [   





From Monitor]   


 


Respiratory 13 17 





Rate   


 


Respiratory   





Rate [Anterior   





Bilateral   





Throughout]   


 


Blood Pressure 110/44 110/44 


 


O2 Sat by Pulse 95 93 95





Oximetry   














  12/01/19 12/01/19 12/01/19





  08:00 08:02 08:31


 


Temperature   


 


Pulse Rate 60  69


 


Pulse Rate [  69 





Anterior   





Bilateral   





Throughout]   


 


Pulse Rate [   





From Monitor]   


 


Respiratory 15  14





Rate   


 


Respiratory  16 





Rate [Anterior   





Bilateral   





Throughout]   


 


Blood Pressure 101/50  101/50


 


O2 Sat by Pulse 95  92





Oximetry   














  12/01/19





  09:01


 


Temperature 


 


Pulse Rate 74


 


Pulse Rate [ 





Anterior 





Bilateral 





Throughout] 


 


Pulse Rate [ 





From Monitor] 


 


Respiratory 18





Rate 


 


Respiratory 





Rate [Anterior 





Bilateral 





Throughout] 


 


Blood Pressure 103/58


 


O2 Sat by Pulse 94





Oximetry 











Constitutional: no acute distress, alert


Eyes: non-icteric


ENT: oropharynx moist


Neck: supple


Effort: normal


Ascultation: Bilateral: clear


Cardiovascular: regular rate and rhythm (no mrg)


Gastrointestinal: normoactive bowel sounds, soft, non-tender, non-distended


Integumentary: normal


Extremities: no cyanosis, no edema, pink and warm


Neurologic: other (Cannot obtain as intubated/sedated)


Psychiatric: other (Cannot obtain as intubated/sedated)


CBC and BMP: 


                                 11/30/19 10:57





                                 11/30/19 10:06


ABG, PT/INR, D-dimer: 


ABG











POC ABG pH  7.418  (7.35-7.45)   11/30/19  10:06    


 


POC ABG pCO2  41.4  (35-45)   11/30/19  10:06    


 


POC ABG pO2  208  ()  H  11/30/19  10:06    


 


POC ABG HCO3  26.7  (22-26 mml/L)   11/30/19  10:06    


 


POC ABG Total CO2  28  (23-27mmol/L)   11/30/19  10:06    


 


POC ABG O2 Sat  100   11/30/19  10:06    





PT/INR, D-dimer











PT  13.9 Sec. (12.2-14.9)   11/30/19  10:57    


 


INR  1.08  (0.87-1.13)   11/30/19  10:57    








Abnormal lab findings: 


                                  Abnormal Labs











  11/24/19 11/24/19 11/24/19





  11:10 11:10 11:10


 


WBC   


 


MCV  99 H  


 


MCH  34 H  


 


RDW  13.1 L  


 


Lymph % (Auto)   


 


Mono % (Auto)  12.1 H  


 


Baso % (Auto)  1.9 H  


 


Lymph #   


 


Mono #  1.2 H  


 


Baso #  0.2 H  


 


Seg Neutrophils %   


 


Seg Neutrophils #   


 


PT   30.5 H 


 


INR   3.00 H 


 


Heparin Anti-Xa Level   


 


POC ABG pH   


 


POC ABG pCO2   


 


POC ABG pO2   


 


Sodium   


 


Chloride   


 


Carbon Dioxide    18 L


 


BUN   


 


Glucose    103 H


 


POC Glucose   


 


Phosphorus   


 


AST    58 H


 


Lactate Dehydrogenase   


 


Total Creatine Kinase    215 H


 


CK-MB (CK-2)    4.5 H


 


Albumin    3.1 L


 


Amylase   


 


Lipase    8 L


 


Salicylates   


 


Acetaminophen   














  11/24/19 11/24/19 11/24/19





  11:10 11:10 11:33


 


WBC   


 


MCV   


 


MCH   


 


RDW   


 


Lymph % (Auto)   


 


Mono % (Auto)   


 


Baso % (Auto)   


 


Lymph #   


 


Mono #   


 


Baso #   


 


Seg Neutrophils %   


 


Seg Neutrophils #   


 


PT   


 


INR   


 


Heparin Anti-Xa Level   


 


POC ABG pH    7.291 L


 


POC ABG pCO2    49.7 H


 


POC ABG pO2    234 H


 


Sodium   


 


Chloride   


 


Carbon Dioxide   


 


BUN   


 


Glucose   


 


POC Glucose   


 


Phosphorus   


 


AST   


 


Lactate Dehydrogenase   


 


Total Creatine Kinase   


 


CK-MB (CK-2)   


 


Albumin   


 


Amylase   


 


Lipase   


 


Salicylates  < 0.3 L  


 


Acetaminophen   < 5.0 L 














  11/24/19 11/25/19 11/25/19





  19:30 04:18 04:32


 


WBC    4.0 L


 


MCV    100 H


 


MCH    34 H


 


RDW   


 


Lymph % (Auto)    9.6 L


 


Mono % (Auto)   


 


Baso % (Auto)   


 


Lymph #    0.4 L


 


Mono #   


 


Baso #   


 


Seg Neutrophils %    86.4 H


 


Seg Neutrophils #   


 


PT   


 


INR   


 


Heparin Anti-Xa Level   


 


POC ABG pH   7.348 L 


 


POC ABG pCO2   


 


POC ABG pO2   108 H 


 


Sodium   


 


Chloride   


 


Carbon Dioxide   


 


BUN   


 


Glucose   


 


POC Glucose   


 


Phosphorus   


 


AST   


 


Lactate Dehydrogenase   


 


Total Creatine Kinase   


 


CK-MB (CK-2)   


 


Albumin  3.2 L  


 


Amylase  438 H  


 


Lipase   


 


Salicylates   


 


Acetaminophen   














  11/25/19 11/30/19 11/30/19





  04:32 09:39 10:06


 


WBC    16.6 H


 


MCV    99 H


 


MCH    33 H


 


RDW    12.3 L


 


Lymph % (Auto)   


 


Mono % (Auto)    11.1 H


 


Baso % (Auto)   


 


Lymph #   


 


Mono #    1.8 H


 


Baso #   


 


Seg Neutrophils %   


 


Seg Neutrophils #    11.3 H


 


PT   


 


INR   


 


Heparin Anti-Xa Level   


 


POC ABG pH   


 


POC ABG pCO2   


 


POC ABG pO2   


 


Sodium   


 


Chloride  107.5 H  


 


Carbon Dioxide   


 


BUN   


 


Glucose  118 H  


 


POC Glucose   175 H 


 


Phosphorus   


 


AST  73 H  


 


Lactate Dehydrogenase   


 


Total Creatine Kinase   


 


CK-MB (CK-2)   


 


Albumin  3.2 L  


 


Amylase   


 


Lipase   


 


Salicylates   


 


Acetaminophen   














  11/30/19 11/30/19 11/30/19





  10:06 10:06 13:21


 


WBC   


 


MCV   


 


MCH   


 


RDW   


 


Lymph % (Auto)   


 


Mono % (Auto)   


 


Baso % (Auto)   


 


Lymph #   


 


Mono #   


 


Baso #   


 


Seg Neutrophils %   


 


Seg Neutrophils #   


 


PT   


 


INR   


 


Heparin Anti-Xa Level   


 


POC ABG pH   


 


POC ABG pCO2   


 


POC ABG pO2   208 H 


 


Sodium  136 L  


 


Chloride   


 


Carbon Dioxide   


 


BUN  22 H  


 


Glucose   


 


POC Glucose   


 


Phosphorus  1.80 L  


 


AST   


 


Lactate Dehydrogenase  207 H  


 


Total Creatine Kinase  302 H   268 H


 


CK-MB (CK-2)   


 


Albumin   


 


Amylase   


 


Lipase   


 


Salicylates   


 


Acetaminophen   














  11/30/19





  19:20


 


WBC 


 


MCV 


 


MCH 


 


RDW 


 


Lymph % (Auto) 


 


Mono % (Auto) 


 


Baso % (Auto) 


 


Lymph # 


 


Mono # 


 


Baso # 


 


Seg Neutrophils % 


 


Seg Neutrophils # 


 


PT 


 


INR 


 


Heparin Anti-Xa Level  0.17 L


 


POC ABG pH 


 


POC ABG pCO2 


 


POC ABG pO2 


 


Sodium 


 


Chloride 


 


Carbon Dioxide 


 


BUN 


 


Glucose 


 


POC Glucose 


 


Phosphorus 


 


AST 


 


Lactate Dehydrogenase 


 


Total Creatine Kinase 


 


CK-MB (CK-2) 


 


Albumin 


 


Amylase 


 


Lipase 


 


Salicylates 


 


Acetaminophen

## 2019-12-02 LAB
BUN SERPL-MCNC: 17 MG/DL (ref 9–20)
BUN/CREAT SERPL: 21 %
CALCIUM SERPL-MCNC: 9.9 MG/DL (ref 8.4–10.2)
HCT VFR BLD CALC: 41.6 % (ref 35.5–45.6)
HEMOLYSIS INDEX: 7
HGB BLD-MCNC: 13.9 GM/DL (ref 11.8–15.2)
MCHC RBC AUTO-ENTMCNC: 34 % (ref 32–34)
MCV RBC AUTO: 98 FL (ref 84–94)
PLATELET # BLD: 350 K/MM3 (ref 140–440)
RBC # BLD AUTO: 4.24 M/MM3 (ref 3.65–5.03)

## 2019-12-02 RX ADMIN — NICOTINE SCH MG: 7 PATCH TRANSDERMAL at 10:19

## 2019-12-02 RX ADMIN — ARFORMOTEROL TARTRATE SCH MCG: 15 SOLUTION RESPIRATORY (INHALATION) at 22:13

## 2019-12-02 RX ADMIN — BUDESONIDE SCH MG: 0.25 INHALANT RESPIRATORY (INHALATION) at 07:21

## 2019-12-02 RX ADMIN — METHYLPREDNISOLONE SODIUM SUCCINATE SCH: 125 INJECTION, POWDER, FOR SOLUTION INTRAMUSCULAR; INTRAVENOUS at 14:00

## 2019-12-02 RX ADMIN — Medication SCH ML: at 10:19

## 2019-12-02 RX ADMIN — NITROGLYCERIN SCH: 20 OINTMENT TOPICAL at 12:00

## 2019-12-02 RX ADMIN — NITROGLYCERIN SCH: 20 OINTMENT TOPICAL at 17:27

## 2019-12-02 RX ADMIN — FAMOTIDINE SCH MG: 20 TABLET ORAL at 10:18

## 2019-12-02 RX ADMIN — NITROGLYCERIN SCH: 20 OINTMENT TOPICAL at 06:25

## 2019-12-02 RX ADMIN — ARFORMOTEROL TARTRATE SCH MCG: 15 SOLUTION RESPIRATORY (INHALATION) at 07:20

## 2019-12-02 RX ADMIN — METHYLPREDNISOLONE SODIUM SUCCINATE SCH MG: 125 INJECTION, POWDER, FOR SOLUTION INTRAMUSCULAR; INTRAVENOUS at 21:15

## 2019-12-02 RX ADMIN — Medication SCH ML: at 21:15

## 2019-12-02 RX ADMIN — METHYLPREDNISOLONE SODIUM SUCCINATE SCH MG: 125 INJECTION, POWDER, FOR SOLUTION INTRAMUSCULAR; INTRAVENOUS at 06:24

## 2019-12-02 RX ADMIN — BUDESONIDE SCH MG: 0.25 INHALANT RESPIRATORY (INHALATION) at 22:13

## 2019-12-02 NOTE — DISCHARGE SUMMARY
Providers





- Providers


Date of Admission: 


11/24/19 12:42





Attending physician: 


GIOVANNI PARKER MD





                                        





11/24/19


Consult to Case Management [CONS] Routine 


   Services Needed at Discharge: 


   Notified:: Beronica Parsons


   Phone number called:: in person


   Was contact made?: Yes


   If yes, spoke with:: Beronica Parsons


   Time called:: 10:15





11/24/19 09:47


Consult to Dietitian/Nutrition [CONS] Routine 


   Physician Instructions: 


   Reason For Exam: 


   Reason for Consult: Evaluate nutritional intake





11/24/19 18:34


Consult to Physician [CONS] Stat 


   Comment: 


   Consulting Provider: BERHANE BAHENA


   Physician Instructions: 


   Reason For Exam: CCU admission





11/24/19 19:08


Consult to Dietitian/Nutrition [CONS] Routine 


   Physician Instructions: Tube feeding


   Reason For Exam: Intubated


   Reason for Consult: Pt needs oral supplement





11/24/19 19:28


Consult to Dietitian/Nutrition [CONS] Routine 


   Physician Instructions: Assess nutrtn needs, initiate, modify, manage TF


   Reason For Exam: 


   Reason for Consult: Write/Manage Tube Feeding


   Reason for Consult: Write/Manage Tube Feeding





11/26/19 08:50


Consult to PICC Line RN [CONS] Routine 


   Reason For Exam: POOR ACCESS


   Type Line:: Midline





11/30/19


Consult to Cardiac Rehabilitation [CONS] Routine 


   Reason For Exam: Phase I





11/30/19 09:30


Consult to Physician [CONS] Routine 


   Comment: 


   Consulting Provider: YASH WOODS


   Physician Instructions: 


   Reason For Exam: chest pain





12/02/19 08:30


Consult to Mental Health [CONS] Routine 


   Reason For Exam: mental health


   Place consult to:: suicidal ideation


   Notified:: Moe Lema


   Phone number called:: [phone]


   Was contact made?: Yes


   If yes, spoke with:: Moe Lema


   Time called:: 10:20











Primary care physician: 


PRIMARY CARE MD








Hospitalization


Condition: Stable


Disposition: DC-01 TO HOME OR SELFCARE





Exam





- Constitutional


Vitals: 


                                        











Temp Pulse Resp BP Pulse Ox


 


 98.4 F   75   16   92/44   96 


 


 12/02/19 05:13  12/02/19 08:11  12/02/19 08:11  12/02/19 05:13  12/02/19 08:11














Plan


Activity: advance as tolerated, fall precautions


Diet: low fat


Follow up with: 


[facility] [Outside] - 7 Days


BRUNO PASTOR MD [Staff Physician] - 7 Days


PRIMARY CARE,MD [Primary Care Provider] - 3-5 Days


Prescriptions: 


Simvastatin 40 mg PO QHS #30 tablet


Aspirin [Adult Aspirin] 81 mg PO DAILY #30 tablet.


Prednisone [predniSONE 10 mg (6-Day Pack, 21 Tabs)] 10 mg PO .TAPER #1 tab.ds.pk

## 2019-12-02 NOTE — PROGRESS NOTE
Assessment and Plan





Suicidal ideation


Atypical chest pain, pleuritic in nature


   Normal Echo EF 55-60%


Polysubstance abuse


COPD exacerbation


Respiratory failure 








Subjective


Date of service: 12/02/19


Principal diagnosis: Acute respiratory failure


Interval history: 





No cardiac complaints. Sitter is at the bedside.





Objective


                                   Vital Signs











  Temp Pulse Pulse Resp Resp BP Pulse Ox


 


 12/02/19 11:39  98.4 F  88   20   103/56  97


 


 12/02/19 10:00   72     


 


 12/02/19 08:11    75   16   96


 


 12/02/19 05:13  98.4 F  64   18   92/44  93


 


 12/01/19 20:12        93


 


 12/01/19 20:09    88   16  


 


 12/01/19 20:02  98.2 F  86   22   100/64  95


 


 12/01/19 16:02  98.7 F  89   20   105/59  93














- Physical Examination


General: No Apparent Distress


HEENT: Positive: PERRL


Neck: Positive: neck supple


Cardiac: Positive: Reg Rate and Rhythm


Lungs: Positive: Decreased Breath Sounds


Neuro: Positive: Grossly Intact


Abdomen: Negative: Pulsations/Bruits


Extremities: Absent: edema





- Labs and Meds


                                       CBC











  12/02/19 Range/Units





  06:54 


 


WBC  22.3 H  (4.5-11.0)  K/mm3


 


RBC  4.24  (3.65-5.03)  M/mm3


 


Hgb  13.9  (11.8-15.2)  gm/dl


 


Hct  41.6  (35.5-45.6)  %


 


Plt Count  350  (140-440)  K/mm3








                          Comprehensive Metabolic Panel











  12/02/19 Range/Units





  06:54 


 


Sodium  140  (137-145)  mmol/L


 


Potassium  4.8  (3.6-5.0)  mmol/L


 


Chloride  102.7  ()  mmol/L


 


Carbon Dioxide  24  (22-30)  mmol/L


 


BUN  17  (9-20)  mg/dL


 


Creatinine  0.8  (0.8-1.5)  mg/dL


 


Glucose  133 H  ()  mg/dL


 


Calcium  9.9  (8.4-10.2)  mg/dL

## 2019-12-02 NOTE — PROGRESS NOTE
Assessment and Plan


Assessment and plan: 


patient 48 year old with a history of emphysema, crack cocaine abuse and tobacco

abuse presents [place] bridge with a failed attempt at suicide.  

Patient was threatening to jump off a bridge he ended up falling down 

approximately 4 foot embankment.  Patient was brought to the ED was found to be 

in acute distress with agonal breathing.  Patient required intubation because of

acute hypoxemia.   Patient will require psych consult and 1013 after he wakes up

and is more stable and responsive.








CT Cervical Spine: IMPRESSION: 1. No indication of fracture or traumatic ellis

bluxation. 2. Increased soft tissue fullness in the prevertebral and 

retropharyngeal region may represent retained secretions secondary to 

nasotracheal intubation, however, follow-up study is suggested to exclude other 

retropharyngeal or prevertebral pathology such as mass or hematoma. 3. Extensive

biapical bullous changes in both lung apices. 


chest xray: IMPRESSION: 1. Stable appearance of the chest. No acute Pathology


Bilateral Tib/fiB XRAY: IMPRESSION: No acute abnormality of either tibia/fibula.




CT A/P IMPRESSION: 1. Limited noncontrast exam demonstrating no acute 

abnormality of the abdomen or pelvis. 2. Additional findings as above. 


CT HEAD/BRAIN: IMPRESSION: Cranial CT scan within normal limits. Mild sinus 

disease, likely chronic.





Repeat CT Cervical spine- prior noted pharyngeal edema is resolving. 





* Extubated and doing well


* Bilateral upper ext edema improving


* awaiting pysch


* S/P syncope with return to spontaneous respiration. 


   * ct chest negative except for stable multifocal bullea. Continue to monitor.

     


   * Monitor in ICU, OBTAIN cardiology EVALUATION AS PATIENT COMPLAINED OF CHEST

     PAIN DURING THE EPISODE, INITIAL WORK UP NEGATIVE ON TROPNIN AND CHEST PAIN

     IS RESOLVED. 


* Previously noted pharyngeal edema is better. 


* Echo pending


* Overnight 12/1/19- c/o of chest pain, improved with Nitro. cardiology states 

  more of pleuritic chest pain. 


* Patient is medically cleared for discharge.


* Out patient follow up with cardiology and they can re-evaluate to see if 

  ischemic work up is needed


* Possible underlying Anxiety, Now agreeable to Drug Rehab on Discharge. 


* awaiting pysch clearance for discahrge








- Patient Problems





Chest pain with resultant Syncope


Complained of chest pain then became unreponsive, no evidence of loss of pulse, 

spontaneously woke up.


Cardiology consulted


Will transfer to Tele and defer to cardiology for possible stress test


CTA chest negative for PE


Continue Nitro





COPD exacerbation


Current Visit: No   Status: Acute   


Plan to address problem: 


Patient has wheezing and emphysema.  Chest x-ray consistent with emphysema.  

Most likely exacerbated by suicide attempt and crack abuse.


Pulmonary following, now extubated


Tobacco use disorder- 15 mins counselling povided





Respiratory failure on mechanical ventilation for >48 hrs now extubated


Current Visit: Yes   Status: Acute   


Qualifiers: 


   Chronicity: acute   Respiratory failure complication: unspecified whether 

with hypoxia or hypercapnia   Qualified Code(s): J96.00 - Acute respiratory 

failure, unspecified whether with hypoxia or hypercapnia   


Plan to address problem: 


Respiratory failure I reviewed all CT scans head chest abdomen pelvis chest x-

ray.   The to help with underlying condition and workup.


Pulmonary following the patient


?Pharyngeal swelling, resolved.on repeat imaging





Cocaine abuse


Current Visit: Yes   Status: Acute   


Plan to address problem: 


Patient is no longer tachycardic or shows any evidence of crack overdose of 

cocaine overdose at this particular time.  15 mins counselling provided





Leukocytosis


Likely reactive vs solumedrol induced. Continue to monitor








Suicidal ideation


Current Visit: Yes   Status: Acute   


Plan to address problem: 


Patient with suicide attempt.   1013 


Mental health consult placed





Acute metabolic Encephalopathy


Resolved





Severe Protein Malnutrition


Current Visit: Yes   Status: Acute   


Plan to address problem: 


Most likely secondary to drug abuse patient remains cachectic.  We'll encourage 

boost once patient is extubated.





DVT/GI PROPHY


Discussed with Pulmonologist and patient








History


Interval history: 


Patient seen and examined, no family at bedside. doing well this am, no further 

chest pain, work up so far is negative





Hospitalist Physical





- Physical exam


Narrative exam: 


General appearance: Present: no distress cachectic,


- EENT


Eyes: Present: PERRL


ENT: poor dentition, 





- Neck


Neck: Present: supple, normal ROM





- Respiratory


Respiratory effort: normal


Respiratory: bilateral: CTA (bronchivesicular)





- Cardiovascular


Rhythm: regular


Heart Sounds: Present: S1 & S2.  Absent: systolic murmur





- Extremities


Extremities: no ischemia, pulses intact, pulses symmetrical, No edema, normal 

temperature, normal color, Full ROM


Peripheral Pulses: within normal limits





- Abdominal


General gastrointestinal: soft, non-tender , non-distended, normal bowel sounds





- Integumentary


Integumentary: Present: warm, bilateral upper ext edema, right Fletcher swelling, 

chronic per patient. 





- Psychiatric


Psychiatric: othe





- Neurologic


Neurologic: other





- Allied Health


Allied health notes reviewed: nursing











- Constitutional


Vitals: 


                                        











Temp Pulse Resp BP Pulse Ox


 


 98.4 F   88   20   103/56   97 


 


 12/02/19 11:39  12/02/19 11:39  12/02/19 11:39  12/02/19 11:39  12/02/19 11:39











General appearance: Present: severe distress, cachectic, disheveled, other (int

ubated sedation.)





Results





- Labs


CBC & Chem 7: 


                                 12/02/19 06:54





                                 12/02/19 06:54


Labs: 


                             Laboratory Last Values











WBC  22.3 K/mm3 (4.5-11.0)  H  12/02/19  06:54    


 


RBC  4.24 M/mm3 (3.65-5.03)   12/02/19  06:54    


 


Hgb  13.9 gm/dl (11.8-15.2)   12/02/19  06:54    


 


Hct  41.6 % (35.5-45.6)   12/02/19  06:54    


 


MCV  98 fl (84-94)  H  12/02/19  06:54    


 


MCH  33 pg (28-32)  H  12/02/19  06:54    


 


MCHC  34 % (32-34)   12/02/19  06:54    


 


RDW  12.7 % (13.2-15.2)  L  12/02/19  06:54    


 


Plt Count  350 K/mm3 (140-440)   12/02/19  06:54    


 


Lymph % (Auto)  19.9 % (13.4-35.0)   11/30/19  10:06    


 


Mono % (Auto)  11.1 % (0.0-7.3)  H  11/30/19  10:06    


 


Eos % (Auto)  0.4 % (0.0-4.3)   11/30/19  10:06    


 


Baso % (Auto)  0.3 % (0.0-1.8)   11/30/19  10:06    


 


Lymph #  3.3 K/mm3 (1.2-5.4)   11/30/19  10:06    


 


Mono #  1.8 K/mm3 (0.0-0.8)  H  11/30/19  10:06    


 


Eos #  0.1 K/mm3 (0.0-0.4)   11/30/19  10:06    


 


Baso #  0.0 K/mm3 (0.0-0.1)   11/30/19  10:06    


 


Add Manual Diff  Complete   12/01/19  09:32    


 


Total Counted  100   12/01/19  09:32    


 


Seg Neutrophils %  Np   12/01/19  09:32    


 


Seg Neuts % (Manual)  86.0 % (40.0-70.0)  H  12/01/19  09:32    


 


Band Neutrophils %  2.0 %  12/01/19  09:32    


 


Lymphocytes % (Manual)  9.0 % (13.4-35.0)  L  12/01/19  09:32    


 


Reactive Lymphs % (Man)  0 %  12/01/19  09:32    


 


Monocytes % (Manual)  3.0 % (0.0-7.3)   12/01/19  09:32    


 


Eosinophils % (Manual)  0 % (0.0-4.3)   12/01/19  09:32    


 


Basophils % (Manual)  0 % (0.0-1.8)   12/01/19  09:32    


 


Metamyelocytes %  0 %  12/01/19  09:32    


 


Myelocytes %  0 %  12/01/19  09:32    


 


Promyelocytes %  0 %  12/01/19  09:32    


 


Blast Cells %  0 %  12/01/19  09:32    


 


Nucleated RBC %  Not Reportable   12/01/19  09:32    


 


Seg Neutrophils #  11.3 K/mm3 (1.8-7.7)  H  11/30/19  10:06    


 


Seg Neutrophils # Man  13.1 K/mm3 (1.8-7.7)  H  12/01/19  09:32    


 


Band Neutrophils #  0.3 K/mm3  12/01/19  09:32    


 


Lymphocytes # (Manual)  1.4 K/mm3 (1.2-5.4)   12/01/19  09:32    


 


Abs React Lymphs (Man)  0.0 K/mm3  12/01/19  09:32    


 


Monocytes # (Manual)  0.5 K/mm3 (0.0-0.8)   12/01/19  09:32    


 


Eosinophils # (Manual)  0.0 K/mm3 (0.0-0.4)   12/01/19  09:32    


 


Basophils # (Manual)  0.0 K/mm3 (0.0-0.1)   12/01/19  09:32    


 


Metamyelocytes #  0.0 K/mm3  12/01/19  09:32    


 


Myelocytes #  0.0 K/mm3  12/01/19  09:32    


 


Promyelocytes #  0.0 K/mm3  12/01/19  09:32    


 


Blast Cells #  0.0 K/mm3  12/01/19  09:32    


 


WBC Morphology  Not Reportable   12/01/19  09:32    


 


Hypersegmented Neuts  Not Reportable   12/01/19  09:32    


 


Hyposegmented Neuts  Not Reportable   12/01/19  09:32    


 


Hypogranular Neuts  Not Reportable   12/01/19  09:32    


 


Smudge Cells  Not Reportable   12/01/19  09:32    


 


Toxic Granulation  Not Reportable   12/01/19  09:32    


 


Toxic Vacuolation  Not Reportable   12/01/19  09:32    


 


Dohle Bodies  Not Reportable   12/01/19  09:32    


 


Pelger-Huet Anomaly  Not Reportable   12/01/19  09:32    


 


Gina Rods  Not Reportable   12/01/19  09:32    


 


Platelet Estimate  Not Reportable   12/01/19  09:32    


 


Clumped Platelets  Not Reportable   12/01/19  09:32    


 


Plt Clumps, EDTA  Not Reportable   12/01/19  09:32    


 


Large Platelets  Not Reportable   12/01/19  09:32    


 


Giant Platelets  Not Reportable   12/01/19  09:32    


 


Platelet Satelliting  Not Reportable   12/01/19  09:32    


 


Plt Morphology Comment  Not Reportable   12/01/19  09:32    


 


RBC Morphology  Not Reportable   12/01/19  09:32    


 


Dimorphic RBCs  Not Reportable   12/01/19  09:32    


 


Polychromasia  Not Reportable   12/01/19  09:32    


 


Hypochromasia  Not Reportable   12/01/19  09:32    


 


Poikilocytosis  Not Reportable   12/01/19  09:32    


 


Anisocytosis  Few   12/01/19  09:32    


 


Microcytosis  Not Reportable   12/01/19  09:32    


 


Macrocytosis  Not Reportable   12/01/19  09:32    


 


Spherocytes  Not Reportable   12/01/19  09:32    


 


Pappenheimer Bodies  Not Reportable   12/01/19  09:32    


 


Sickle Cells  Not Reportable   12/01/19  09:32    


 


Target Cells  Few   12/01/19  09:32    


 


Tear Drop Cells  Not Reportable   12/01/19  09:32    


 


Ovalocytes  Not Reportable   12/01/19  09:32    


 


Helmet Cells  Not Reportable   12/01/19  09:32    


 


Sapp-[place] Bodies  Not Reportable   12/01/19  09:32    


 


Cabot Rings  Not Reportable   12/01/19  09:32    


 


Kyrie Cells  Not Reportable   12/01/19  09:32    


 


Bite Cells  Not Reportable   12/01/19  09:32    


 


Crenated Cell  Not Reportable   12/01/19  09:32    


 


Elliptocytes  Not Reportable   12/01/19  09:32    


 


Acanthocytes (Spur)  Not Reportable   12/01/19  09:32    


 


Rouleaux  Not Reportable   12/01/19  09:32    


 


Hemoglobin C Crystals  Not Reportable   12/01/19  09:32    


 


Schistocytes  Not Reportable   12/01/19  09:32    


 


Malaria parasites  Not Reportable   12/01/19  09:32    


 


Alan Bodies  Not Reportable   12/01/19  09:32    


 


Hem Pathologist Commnt  No   12/01/19  09:32    


 


PT  13.9 Sec. (12.2-14.9)   11/30/19  10:57    


 


INR  1.08  (0.87-1.13)   11/30/19  10:57    


 


APTT  26.4 Sec. (24.2-36.6)   11/30/19  10:57    


 


Heparin Anti-Xa Level  0.17 U.I./ml (0.3-0.7)  L  11/30/19  19:20    


 


POC ABG pH  7.418  (7.35-7.45)   11/30/19  10:06    


 


POC ABG pCO2  41.4  (35-45)   11/30/19  10:06    


 


POC ABG pO2  208  ()  H  11/30/19  10:06    


 


POC ABG HCO3  26.7  (22-26 mml/L)   11/30/19  10:06    


 


POC ABG Total CO2  28  (23-27mmol/L)   11/30/19  10:06    


 


POC ABG O2 Sat  100   11/30/19  10:06    


 


POC ABG Base Excess  2  ((-2) - (+3)mmol/L)   11/30/19  10:06    


 


FiO2  50 %  11/30/19  10:06    


 


Sodium  140 mmol/L (137-145)   12/02/19  06:54    


 


Potassium  4.8 mmol/L (3.6-5.0)   12/02/19  06:54    


 


Chloride  102.7 mmol/L ()   12/02/19  06:54    


 


Carbon Dioxide  24 mmol/L (22-30)   12/02/19  06:54    


 


Anion Gap  18 mmol/L  12/02/19  06:54    


 


BUN  17 mg/dL (9-20)   12/02/19  06:54    


 


Creatinine  0.8 mg/dL (0.8-1.5)   12/02/19  06:54    


 


Estimated GFR  > 60 ml/min  12/02/19  06:54    


 


BUN/Creatinine Ratio  21 %  12/02/19  06:54    


 


Glucose  133 mg/dL ()  H  12/02/19  06:54    


 


POC Glucose  175  ()  H  11/30/19  09:39    


 


Hemoglobin A1c  5.3 % (4-6)   11/24/19  11:10    


 


Calcium  9.9 mg/dL (8.4-10.2)   12/02/19  06:54    


 


Phosphorus  1.80 mg/dL (2.5-4.5)  L  11/30/19  10:06    


 


Magnesium  2.20 mg/dL (1.7-2.3)   11/30/19  10:06    


 


Total Bilirubin  0.40 mg/dL (0.1-1.2)   11/25/19  04:32    


 


Direct Bilirubin  < 0.2 mg/dL (0-0.2)   11/24/19  11:10    


 


Indirect Bilirubin  0.2 mg/dL  11/24/19  11:10    


 


AST  73 units/L (5-40)  H  11/25/19  04:32    


 


ALT  56 units/L (7-56)   11/25/19  04:32    


 


Alkaline Phosphatase  82 units/L ()   11/25/19  04:32    


 


Ammonia  29.0 umol/L (25-60)   11/24/19  15:02    


 


Lactate Dehydrogenase  207 units/L ()  H  11/30/19  10:06    


 


Total Creatine Kinase  268 units/L ()  H  11/30/19  13:21    


 


CK-MB (CK-2)  1.4 ng/mL (0.0-4.0)   11/30/19  13:21    


 


CK-MB (CK-2) Rel Index  0.5  (0-4)   11/30/19  13:21    


 


Troponin T  < 0.010 ng/mL (0.00-0.029)   11/30/19  15:37    


 


NT-Pro-B Natriuret Pep  33.88 pg/mL (0-450)   11/24/19  11:10    


 


Total Protein  7.4 g/dL (6.3-8.2)   11/25/19  04:32    


 


Albumin  3.2 g/dL (3.9-5)  L  11/25/19  04:32    


 


Albumin/Globulin Ratio  0.8 %  11/25/19  04:32    


 


Amylase  438 units/L ()  H  11/24/19  19:30    


 


Lipase  8 units/L (13-60)  L  11/24/19  11:10    


 


Urine Color  Yellow  (Yellow)   11/24/19  11:59    


 


Urine Turbidity  Clear  (Clear)   11/24/19  11:59    


 


Urine pH  6.0  (5.0-7.0)   11/24/19  11:59    


 


Ur Specific Gravity  1.020  (1.003-1.030)   11/24/19  11:59    


 


Urine Protein  <15 mg/dl mg/dL (Negative)   11/24/19  11:59    


 


Urine Glucose (UA)  Neg mg/dL (Negative)   11/24/19  11:59    


 


Urine Ketones  Tr mg/dL (Negative)   11/24/19  11:59    


 


Urine Blood  Sm  (Negative)   11/24/19  11:59    


 


Urine Nitrite  Neg  (Negative)   11/24/19  11:59    


 


Urine Bilirubin  Neg  (Negative)   11/24/19  11:59    


 


Urine Urobilinogen  4.0 mg/dL (<2.0)   11/24/19  11:59    


 


Ur Leukocyte Esterase  Neg  (Negative)   11/24/19  11:59    


 


Urine WBC (Auto)  3.0 /HPF (0.0-6.0)   11/24/19  11:59    


 


Urine RBC (Auto)  25.0 /HPF (0.0-6.0)   11/24/19  11:59    


 


U Epithel Cells (Auto)  1.0 /HPF (0-13.0)   11/24/19  11:59    


 


Calcium Oxalate Crystal  Few   11/24/19  11:59    


 


Urine Mucus  Few /HPF  11/24/19  11:59    


 


Salicylates  < 0.3 mg/dL (2.8-20.0)  L  11/24/19  11:10    


 


Urine Opiates Screen  Presumptive negative   11/24/19  09:57    


 


Urine Methadone Screen  Presumptive negative   11/24/19  09:57    


 


Acetaminophen  < 5.0 ug/mL (10.0-30.0)  L  11/24/19  11:10    


 


Ur Barbiturates Screen  Presumptive negative   11/24/19  09:57    


 


Ur Phencyclidine Scrn  Presumptive negative   11/24/19  09:57    


 


Ur Amphetamines Screen  Presumptive negative   11/24/19  09:57    


 


U Benzodiazepines Scrn  Presumptive positive   11/24/19  09:57    


 


Urine Cocaine Screen  Presumptive positive   11/24/19  09:57    


 


U Marijuana (THC) Screen  Presumptive negative   11/24/19  09:57    


 


Drugs of Abuse Note  Disclamer   11/24/19  09:57    


 


Plasma/Serum Alcohol  < 0.01 % (0-0.07)   11/24/19  11:10    


 


Blood Type  O POSITIVE   11/24/19  10:05    


 


Antibody Screen  Negative   11/24/19  10:05    














Active Medications





- Current Medications


Current Medications: 














Generic Name Dose Route Start Last Admin





  Trade Name Freq  PRN Reason Stop Dose Admin


 


Acetaminophen  650 mg  11/24/19 19:14  11/28/19 13:02





  Tylenol  PO   650 mg





  Q4H PRN   Administration





  Pain MILD(1-3)/Fever >100.5/HA  


 


Albuterol  2.5 mg  11/24/19 19:53  11/30/19 10:09





  Proventil  IH   2.5 mg





  Q4HRT PRN   Administration





  Shortness Of Breath  


 


Arformoterol Tartrate  15 mcg  11/28/19 10:00  12/02/19 07:20





  Brovana Nebu  IH   15 mcg





  Q12HRT MARIE   Administration


 


Budesonide  0.5 mg  11/28/19 16:44  12/02/19 07:21





  Pulmicort  IH   0.5 mg





  Q12HRT MARIE   Administration


 


Clonazepam  0.5 mg  11/26/19 22:00  12/02/19 10:19





  Klonopin  PO   0.5 mg





  BID MARIE   Administration


 


Famotidine  20 mg  11/30/19 15:00  12/02/19 10:18





  Pepcid  PO   20 mg





  QDAY MARIE   Administration


 


Hydrophilic Ointment  1 applic  11/24/19 09:47 





  Vaseline Lip Therapy  TP  





  Q2HR PRN  





  Dry Lips  


 


Lorazepam  2 mg  11/24/19 19:30  11/29/19 14:55





  Ativan  IV   2 mg





  Q1H PRN   Administration





  CIWA-Ar 8-15  


 


Lorazepam  4 mg  11/24/19 19:30  11/28/19 07:06





  Ativan  IV   4 mg





  Q1H PRN   Administration





  CIWA-Ar 16-25  


 


Methylprednisolone Sodium Succinate  40 mg  11/24/19 22:00  12/02/19 14:00





  Solu-Medrol  IV   Not Given





  Q8HR [facility]  


 


Multi-Ingred Cream/Lotion/Oil/Oint  1 applic  11/24/19 09:47 





  Artificial Tears Ophth Oint  OU  





  Q4HR PRN  





  Dry Eye(s)  


 


Nicotine  7 mg  11/28/19 10:00  12/02/19 10:19





  Habitrol  TD   7 mg





  QDAY MARIE   Administration


 


Nitroglycerin  0.5 inch  11/30/19 12:00  12/02/19 17:27





  Nitro-Bid 2%  TP   Not Given





  TIDNTG [facility]  





  Protocol  


 


Ondansetron HCl  4 mg  11/24/19 19:14 





  Zofran  IV  





  Q8H PRN  





  Nausea And Vomiting  


 


Sodium Chloride  10 ml  11/24/19 22:00  12/02/19 10:19





  Sodium Chloride Flush Syringe 10 Ml  IV   10 ml





  BID MARIE   Administration


 


Sodium Chloride  10 ml  11/30/19 10:00 





  Sodium Chloride Flush Syringe 10 Ml  IV  





  PRN PRN  





  LINE FLUSH  














Nutrition/Malnutrition Assess





- Dietary Evaluation


Nutrition/Malnutrition Findings: 


                                        





Nutrition Notes                                            Start:  11/25/19 

11:28


Freq:                                                      Status: Active       




Protocol:                                                                       




 Document     11/27/19 09:36  LM  (Rec: 11/27/19 09:42  LM  [id number])


 Nutrition Notes


     Initial or Follow up                        Reassessment


     Current Diagnosis                           COPD,Respiratory Failure,


                                                 Malnutrition


     Other Pertinent Diagnosis                   cocaine abuse, fall


     Current Diet                                Vital AF 1.2 at 60 ml/hr


     Labs/Tests                                  


     Pertinent Medications                       Solumedrol


     Height                                      5 ft 8 in


     Weight                                      59.3 kg


     Ideal Body Weight (kg)                      70.00


     BMI                                         19.8


     Subjective/Other Information                Vital AF running at 60 ml/hr.


                                                 Per RN pt is tolerating TF.


     Percent of energy/protein needs met:        99%/100%


     Burn                                        Absent


     Trauma                                      Absent


     Current % PO                                Negligible


     Minimum of two criteria                     Yes


     Body Fat Depletion                          Mild depletion (non-severe)


     Muscle Mass                                 Mild Depletion (non-severe)


     #1


      Nutrition Diagnosis                        Malnutrition


      Diagnosis Progress(for reassessment        Continues


       documentation)                            


     Is patient on ventilator?                   Yes


     Is Patient Ambulatory and/or Out of Bed     No


     REE-([facility]-confined to bed)      [phone]


     Calculation Used for Recommendations        [facility]


     Additional Notes                            Protein needs are 71-119g (1.2


                                                 -2g/kg)


                                                 Fluid needs are 1ml/kcal


 Nutrition Intervention


     Change Diet Order:                          TF


     Nutrition Support:                          Vital 1.2 at 60ml/hr


                                                 Water flush 100ml q4h


     Kcal                                        1,728


     Protein (gm)                                109


     Fluid (mL)                                  1,163


     Goal #1                                     Meet at least 80% of kcal and


                                                 protein needs via TF


     Anticipated Discharge Needs:                Unable to determine at this


                                                 time


     Follow-Up By:                               12/04/19


     Additional Comments                         F/U for TF tolerance

## 2019-12-02 NOTE — PROGRESS NOTE
Assessment and Plan


Assessment and plan: 


patient 48 year old with a history of emphysema, crack cocaine abuse and tobacco

abuse presents [place] bridge with a failed attempt at suicide.  

Patient was threatening to jump off a bridge he ended up falling down 

approximately 4 foot embankment.  Patient was brought to the ED was found to be 

in acute distress with agonal breathing.  Patient required intubation because of

acute hypoxemia.   Patient will require psych consult and 1013 after he wakes up

and is more stable and responsive.








CT Cervical Spine: IMPRESSION: 1. No indication of fracture or traumatic ellis

bluxation. 2. Increased soft tissue fullness in the prevertebral and 

retropharyngeal region may represent retained secretions secondary to 

nasotracheal intubation, however, follow-up study is suggested to exclude other 

retropharyngeal or prevertebral pathology such as mass or hematoma. 3. Extensive

biapical bullous changes in both lung apices. 


chest xray: IMPRESSION: 1. Stable appearance of the chest. No acute Pathology


Bilateral Tib/fiB XRAY: IMPRESSION: No acute abnormality of either tibia/fibula.




CT A/P IMPRESSION: 1. Limited noncontrast exam demonstrating no acute 

abnormality of the abdomen or pelvis. 2. Additional findings as above. 


CT HEAD/BRAIN: IMPRESSION: Cranial CT scan within normal limits. Mild sinus 

disease, likely chronic.





Repeat CT Cervical spine- prior noted pharyngeal edema is resolving. 





* Extubated and doing well


* Bilateral upper ext edema improving


* awaiting pysch


* S/P syncope with return to spontaneous respiration. 


   * ct chest negative except for stable multifocal bullea. Continue to monitor.

     


   * Monitor in ICU, OBTAIN cardiology EVALUATION AS PATIENT COMPLAINED OF CHEST

     PAIN DURING THE EPISODE, INITIAL WORK UP NEGATIVE ON TROPNIN AND CHEST PAIN

     IS RESOLVED. 


* Previously noted pharyngeal edema is better. 


* Echo pending


* Overnight 12/1/19- c/o of chest pain, improved with Nitro. cardiology states 

  more of pleuritic chest pain. 


* Patient is medically cleared for discharge.


* Out patient follow up with cardiology and they can re-evaluate to see if 

  ischemic work up is needed


* Possible underlying Anxiety, Now agreeable to Drug Rehab on Discharge. 








- Patient Problems





Chest pain with resultant Syncope


Complained of chest pain then became unreponsive, no evidence of loss of pulse, 

spontaneously woke up.


Cardiology consulted


Will transfer to Tele and defer to cardiology for possible stress test


CTA chest negative for PE


Continue Nitro





COPD exacerbation


Current Visit: No   Status: Acute   


Plan to address problem: 


Patient has wheezing and emphysema.  Chest x-ray consistent with emphysema.  

Most likely exacerbated by suicide attempt and crack abuse.


Pulmonary following, now extubated


Tobacco use disorder- 15 mins counselling povided





Respiratory failure on mechanical ventilation for >48 hrs now extubated


Current Visit: Yes   Status: Acute   


Qualifiers: 


   Chronicity: acute   Respiratory failure complication: unspecified whether 

with hypoxia or hypercapnia   Qualified Code(s): J96.00 - Acute respiratory 

failure, unspecified whether with hypoxia or hypercapnia   


Plan to address problem: 


Respiratory failure I reviewed all CT scans head chest abdomen pelvis chest x-

ray.   The to help with underlying condition and workup.


Pulmonary following the patient


?Pharyngeal swelling, resolved.on repeat imaging





Cocaine abuse


Current Visit: Yes   Status: Acute   


Plan to address problem: 


Patient is no longer tachycardic or shows any evidence of crack overdose of c

ocaine overdose at this particular time.  15 mins counselling provided





Leukocytosis


Likely reactive vs solumedrol induced. Continue to monitor








Suicidal ideation


Current Visit: Yes   Status: Acute   


Plan to address problem: 


Patient with suicide attempt.   1013 


Mental health consult placed





Acute metabolic Encephalopathy


Resolved





Severe Protein Malnutrition


Current Visit: Yes   Status: Acute   


Plan to address problem: 


Most likely secondary to drug abuse patient remains cachectic.  We'll encourage 

boost once patient is extubated.





DVT/GI PROPHY


Discussed with Pulmonologist and patient





The high probability of a clinically significant, sudden or life threatening 

deterioration of the [cardiac] system(s) required my full and direct attention, 

intervention and personal management. The aggregate critical care time was [35] 

minutes. This time is in addition to time spent performing reported procedures 

but includes the following: 





[x] Data Review and interpretation 





[x] Patient assessment and monitoring of vital signs 





[x] Documentation 





[x] Medication orders and management





History


Interval history: 


Patient seen and examined, no family at bedside. doing well this am, no further 

chest pain, work up so far is negative





Hospitalist Physical





- Physical exam


Narrative exam: 


General appearance: Present: no distress cachectic,


- EENT


Eyes: Present: PERRL


ENT: poor dentition, 





- Neck


Neck: Present: supple, normal ROM





- Respiratory


Respiratory effort: normal


Respiratory: bilateral: CTA (bronchivesicular)





- Cardiovascular


Rhythm: regular


Heart Sounds: Present: S1 & S2.  Absent: systolic murmur





- Extremities


Extremities: no ischemia, pulses intact, pulses symmetrical, No edema, normal 

temperature, normal color, Full ROM


Peripheral Pulses: within normal limits





- Abdominal


General gastrointestinal: soft, non-tender , non-distended, normal bowel sounds





- Integumentary


Integumentary: Present: warm, bilateral upper ext edema, right Fletcher swelling, 

chronic per patient. 





- Psychiatric


Psychiatric: othe





- Neurologic


Neurologic: other





- Allied Health


Allied health notes reviewed: nursing











- Constitutional


Vitals: 


                                        











Temp Pulse Resp BP Pulse Ox


 


 98.4 F   64   18   92/44   93 


 


 12/02/19 05:13  12/02/19 05:13  12/02/19 05:13  12/02/19 05:13  12/02/19 05:13











General appearance: Present: severe distress, cachectic, disheveled, other 

(intubated sedation.)





Results





- Labs


CBC & Chem 7: 


                                 12/02/19 06:54





                                 12/02/19 06:54


Labs: 


                             Laboratory Last Values











WBC  15.2 K/mm3 (4.5-11.0)  H  12/01/19  09:32    


 


RBC  4.14 M/mm3 (3.65-5.03)   12/01/19  09:32    


 


Hgb  13.7 gm/dl (11.8-15.2)   12/01/19  09:32    


 


Hct  41.0 % (35.5-45.6)   12/01/19  09:32    


 


MCV  99 fl (84-94)  H  12/01/19  09:32    


 


MCH  33 pg (28-32)  H  12/01/19  09:32    


 


MCHC  33 % (32-34)   12/01/19  09:32    


 


RDW  12.7 % (13.2-15.2)  L  12/01/19  09:32    


 


Plt Count  328 K/mm3 (140-440)   12/01/19  09:32    


 


Lymph % (Auto)  19.9 % (13.4-35.0)   11/30/19  10:06    


 


Mono % (Auto)  11.1 % (0.0-7.3)  H  11/30/19  10:06    


 


Eos % (Auto)  0.4 % (0.0-4.3)   11/30/19  10:06    


 


Baso % (Auto)  0.3 % (0.0-1.8)   11/30/19  10:06    


 


Lymph #  3.3 K/mm3 (1.2-5.4)   11/30/19  10:06    


 


Mono #  1.8 K/mm3 (0.0-0.8)  H  11/30/19  10:06    


 


Eos #  0.1 K/mm3 (0.0-0.4)   11/30/19  10:06    


 


Baso #  0.0 K/mm3 (0.0-0.1)   11/30/19  10:06    


 


Add Manual Diff  Complete   12/01/19  09:32    


 


Total Counted  100   12/01/19  09:32    


 


Seg Neutrophils %  Np   12/01/19  09:32    


 


Seg Neuts % (Manual)  86.0 % (40.0-70.0)  H  12/01/19  09:32    


 


Band Neutrophils %  2.0 %  12/01/19  09:32    


 


Lymphocytes % (Manual)  9.0 % (13.4-35.0)  L  12/01/19  09:32    


 


Reactive Lymphs % (Man)  0 %  12/01/19  09:32    


 


Monocytes % (Manual)  3.0 % (0.0-7.3)   12/01/19  09:32    


 


Eosinophils % (Manual)  0 % (0.0-4.3)   12/01/19  09:32    


 


Basophils % (Manual)  0 % (0.0-1.8)   12/01/19  09:32    


 


Metamyelocytes %  0 %  12/01/19  09:32    


 


Myelocytes %  0 %  12/01/19  09:32    


 


Promyelocytes %  0 %  12/01/19  09:32    


 


Blast Cells %  0 %  12/01/19  09:32    


 


Nucleated RBC %  Not Reportable   12/01/19  09:32    


 


Seg Neutrophils #  11.3 K/mm3 (1.8-7.7)  H  11/30/19  10:06    


 


Seg Neutrophils # Man  13.1 K/mm3 (1.8-7.7)  H  12/01/19  09:32    


 


Band Neutrophils #  0.3 K/mm3  12/01/19  09:32    


 


Lymphocytes # (Manual)  1.4 K/mm3 (1.2-5.4)   12/01/19  09:32    


 


Abs React Lymphs (Man)  0.0 K/mm3  12/01/19  09:32    


 


Monocytes # (Manual)  0.5 K/mm3 (0.0-0.8)   12/01/19  09:32    


 


Eosinophils # (Manual)  0.0 K/mm3 (0.0-0.4)   12/01/19  09:32    


 


Basophils # (Manual)  0.0 K/mm3 (0.0-0.1)   12/01/19  09:32    


 


Metamyelocytes #  0.0 K/mm3  12/01/19  09:32    


 


Myelocytes #  0.0 K/mm3  12/01/19  09:32    


 


Promyelocytes #  0.0 K/mm3  12/01/19  09:32    


 


Blast Cells #  0.0 K/mm3  12/01/19  09:32    


 


WBC Morphology  Not Reportable   12/01/19  09:32    


 


Hypersegmented Neuts  Not Reportable   12/01/19  09:32    


 


Hyposegmented Neuts  Not Reportable   12/01/19  09:32    


 


Hypogranular Neuts  Not Reportable   12/01/19  09:32    


 


Smudge Cells  Not Reportable   12/01/19  09:32    


 


Toxic Granulation  Not Reportable   12/01/19  09:32    


 


Toxic Vacuolation  Not Reportable   12/01/19  09:32    


 


Dohle Bodies  Not Reportable   12/01/19  09:32    


 


Pelger-Huet Anomaly  Not Reportable   12/01/19  09:32    


 


Gina Rods  Not Reportable   12/01/19  09:32    


 


Platelet Estimate  Not Reportable   12/01/19  09:32    


 


Clumped Platelets  Not Reportable   12/01/19  09:32    


 


Plt Clumps, EDTA  Not Reportable   12/01/19  09:32    


 


Large Platelets  Not Reportable   12/01/19  09:32    


 


Giant Platelets  Not Reportable   12/01/19  09:32    


 


Platelet Satelliting  Not Reportable   12/01/19  09:32    


 


Plt Morphology Comment  Not Reportable   12/01/19  09:32    


 


RBC Morphology  Not Reportable   12/01/19  09:32    


 


Dimorphic RBCs  Not Reportable   12/01/19  09:32    


 


Polychromasia  Not Reportable   12/01/19  09:32    


 


Hypochromasia  Not Reportable   12/01/19  09:32    


 


Poikilocytosis  Not Reportable   12/01/19  09:32    


 


Anisocytosis  Few   12/01/19  09:32    


 


Microcytosis  Not Reportable   12/01/19  09:32    


 


Macrocytosis  Not Reportable   12/01/19  09:32    


 


Spherocytes  Not Reportable   12/01/19  09:32    


 


Pappenheimer Bodies  Not Reportable   12/01/19  09:32    


 


Sickle Cells  Not Reportable   12/01/19  09:32    


 


Target Cells  Few   12/01/19  09:32    


 


Tear Drop Cells  Not Reportable   12/01/19  09:32    


 


Ovalocytes  Not Reportable   12/01/19  09:32    


 


Helmet Cells  Not Reportable   12/01/19  09:32    


 


[place] Bodies  Not Reportable   12/01/19  09:32    


 


Cabot Rings  Not Reportable   12/01/19  09:32    


 


Kyrie Cells  Not Reportable   12/01/19  09:32    


 


Bite Cells  Not Reportable   12/01/19  09:32    


 


Crenated Cell  Not Reportable   12/01/19  09:32    


 


Elliptocytes  Not Reportable   12/01/19  09:32    


 


Acanthocytes (Spur)  Not Reportable   12/01/19  09:32    


 


Rouleaux  Not Reportable   12/01/19  09:32    


 


Hemoglobin C Crystals  Not Reportable   12/01/19  09:32    


 


Schistocytes  Not Reportable   12/01/19  09:32    


 


Malaria parasites  Not Reportable   12/01/19  09:32    


 


Alan Bodies  Not Reportable   12/01/19  09:32    


 


Hem Pathologist Commnt  No   12/01/19  09:32    


 


PT  13.9 Sec. (12.2-14.9)   11/30/19  10:57    


 


INR  1.08  (0.87-1.13)   11/30/19  10:57    


 


APTT  26.4 Sec. (24.2-36.6)   11/30/19  10:57    


 


Heparin Anti-Xa Level  0.17 U.I./ml (0.3-0.7)  L  11/30/19  19:20    


 


POC ABG pH  7.418  (7.35-7.45)   11/30/19  10:06    


 


POC ABG pCO2  41.4  (35-45)   11/30/19  10:06    


 


POC ABG pO2  208  ()  H  11/30/19  10:06    


 


POC ABG HCO3  26.7  (22-26 mml/L)   11/30/19  10:06    


 


POC ABG Total CO2  28  (23-27mmol/L)   11/30/19  10:06    


 


POC ABG O2 Sat  100   11/30/19  10:06    


 


POC ABG Base Excess  2  ((-2) - (+3)mmol/L)   11/30/19  10:06    


 


FiO2  50 %  11/30/19  10:06    


 


Sodium  136 mmol/L (137-145)  L  11/30/19  10:06    


 


Potassium  4.0 mmol/L (3.6-5.0)   11/30/19  10:06    


 


Chloride  101.3 mmol/L ()   11/30/19  10:06    


 


Carbon Dioxide  22 mmol/L (22-30)   11/30/19  10:06    


 


Anion Gap  17 mmol/L  11/30/19  10:06    


 


BUN  22 mg/dL (9-20)  H  11/30/19  10:06    


 


Creatinine  0.9 mg/dL (0.8-1.5)   11/30/19  10:06    


 


Estimated GFR  > 60 ml/min  11/30/19  10:06    


 


BUN/Creatinine Ratio  24 %  11/30/19  10:06    


 


Glucose  96 mg/dL ()   11/30/19  10:06    


 


POC Glucose  175  ()  H  11/30/19  09:39    


 


Hemoglobin A1c  5.3 % (4-6)   11/24/19  11:10    


 


Calcium  9.4 mg/dL (8.4-10.2)   11/30/19  10:06    


 


Phosphorus  1.80 mg/dL (2.5-4.5)  L  11/30/19  10:06    


 


Magnesium  2.20 mg/dL (1.7-2.3)   11/30/19  10:06    


 


Total Bilirubin  0.40 mg/dL (0.1-1.2)   11/25/19  04:32    


 


Direct Bilirubin  < 0.2 mg/dL (0-0.2)   11/24/19  11:10    


 


Indirect Bilirubin  0.2 mg/dL  11/24/19  11:10    


 


AST  73 units/L (5-40)  H  11/25/19  04:32    


 


ALT  56 units/L (7-56)   11/25/19  04:32    


 


Alkaline Phosphatase  82 units/L ()   11/25/19  04:32    


 


Ammonia  29.0 umol/L (25-60)   11/24/19  15:02    


 


Lactate Dehydrogenase  207 units/L ()  H  11/30/19  10:06    


 


Total Creatine Kinase  268 units/L ()  H  11/30/19  13:21    


 


CK-MB (CK-2)  1.4 ng/mL (0.0-4.0)   11/30/19  13:21    


 


CK-MB (CK-2) Rel Index  0.5  (0-4)   11/30/19  13:21    


 


Troponin T  < 0.010 ng/mL (0.00-0.029)   11/30/19  15:37    


 


NT-Pro-B Natriuret Pep  33.88 pg/mL (0-450)   11/24/19  11:10    


 


Total Protein  7.4 g/dL (6.3-8.2)   11/25/19  04:32    


 


Albumin  3.2 g/dL (3.9-5)  L  11/25/19  04:32    


 


Albumin/Globulin Ratio  0.8 %  11/25/19  04:32    


 


Amylase  438 units/L ()  H  11/24/19  19:30    


 


Lipase  8 units/L (13-60)  L  11/24/19  11:10    


 


Urine Color  Yellow  (Yellow)   11/24/19  11:59    


 


Urine Turbidity  Clear  (Clear)   11/24/19  11:59    


 


Urine pH  6.0  (5.0-7.0)   11/24/19  11:59    


 


Ur Specific Gravity  1.020  (1.003-1.030)   11/24/19  11:59    


 


Urine Protein  <15 mg/dl mg/dL (Negative)   11/24/19  11:59    


 


Urine Glucose (UA)  Neg mg/dL (Negative)   11/24/19  11:59    


 


Urine Ketones  Tr mg/dL (Negative)   11/24/19  11:59    


 


Urine Blood  Sm  (Negative)   11/24/19  11:59    


 


Urine Nitrite  Neg  (Negative)   11/24/19  11:59    


 


Urine Bilirubin  Neg  (Negative)   11/24/19  11:59    


 


Urine Urobilinogen  4.0 mg/dL (<2.0)   11/24/19  11:59    


 


Ur Leukocyte Esterase  Neg  (Negative)   11/24/19  11:59    


 


Urine WBC (Auto)  3.0 /HPF (0.0-6.0)   11/24/19  11:59    


 


Urine RBC (Auto)  25.0 /HPF (0.0-6.0)   11/24/19  11:59    


 


U Epithel Cells (Auto)  1.0 /HPF (0-13.0)   11/24/19  11:59    


 


Calcium Oxalate Crystal  Few   11/24/19  11:59    


 


Urine Mucus  Few /HPF  11/24/19  11:59    


 


Salicylates  < 0.3 mg/dL (2.8-20.0)  L  11/24/19  11:10    


 


Urine Opiates Screen  Presumptive negative   11/24/19  09:57    


 


Urine Methadone Screen  Presumptive negative   11/24/19  09:57    


 


Acetaminophen  < 5.0 ug/mL (10.0-30.0)  L  11/24/19  11:10    


 


Ur Barbiturates Screen  Presumptive negative   11/24/19  09:57    


 


Ur Phencyclidine Scrn  Presumptive negative   11/24/19  09:57    


 


Ur Amphetamines Screen  Presumptive negative   11/24/19  09:57    


 


U Benzodiazepines Scrn  Presumptive positive   11/24/19  09:57    


 


Urine Cocaine Screen  Presumptive positive   11/24/19  09:57    


 


U Marijuana (THC) Screen  Presumptive negative   11/24/19  09:57    


 


Drugs of Abuse Note  Disclamer   11/24/19  09:57    


 


Plasma/Serum Alcohol  < 0.01 % (0-0.07)   11/24/19  11:10    


 


Blood Type  O POSITIVE   11/24/19  10:05    


 


Antibody Screen  Negative   11/24/19  10:05    














Active Medications





- Current Medications


Current Medications: 














Generic Name Dose Route Start Last Admin





  Trade Name Freq  PRN Reason Stop Dose Admin


 


Acetaminophen  650 mg  11/24/19 19:14  11/28/19 13:02





  Tylenol  PO   650 mg





  Q4H PRN   Administration





  Pain MILD(1-3)/Fever >100.5/HA  


 


Albuterol  2.5 mg  11/24/19 19:53  11/30/19 10:09





  Proventil  IH   2.5 mg





  Q4HRT PRN   Administration





  Shortness Of Breath  


 


Arformoterol Tartrate  15 mcg  11/28/19 10:00  12/02/19 07:20





  Brovana Nebu  IH   15 mcg





  Q12HRT MARIE   Administration


 


Budesonide  0.5 mg  11/28/19 16:44  12/02/19 07:21





  Pulmicort  IH   0.5 mg





  Q12HRT MARIE   Administration


 


Clonazepam  0.5 mg  11/26/19 22:00  12/01/19 21:57





  Klonopin  PO   0.5 mg





  BID MARIE   Administration


 


Famotidine  20 mg  11/30/19 15:00  12/01/19 09:24





  Pepcid  PO   20 mg





  QDAY MARIE   Administration


 


Hydrophilic Ointment  1 applic  11/24/19 09:47 





  Vaseline Lip Therapy  TP  





  Q2HR PRN  





  Dry Lips  


 


Lorazepam  2 mg  11/24/19 19:30  11/29/19 14:55





  Ativan  IV   2 mg





  Q1H PRN   Administration





  CIWA-Ar 8-15  


 


Lorazepam  4 mg  11/24/19 19:30  11/28/19 07:06





  Ativan  IV   4 mg





  Q1H PRN   Administration





  CIWA-Ar 16-25  


 


Methylprednisolone Sodium Succinate  40 mg  11/24/19 22:00  12/02/19 06:24





  Solu-Medrol  IV   40 mg





  Q8HR MARIE   Administration


 


Multi-Ingred Cream/Lotion/Oil/Oint  1 applic  11/24/19 09:47 





  Artificial Tears Ophth Oint  OU  





  Q4HR PRN  





  Dry Eye(s)  


 


Nicotine  7 mg  11/28/19 10:00  12/01/19 09:24





  Habitrol  TD   7 mg





  QDAY [facility]   Administration


 


Nitroglycerin  0.5 inch  11/30/19 12:00  12/02/19 06:25





  Nitro-Bid 2%  TP   Not Given





  TIDNTG [facility]  





  Protocol  


 


Ondansetron HCl  4 mg  11/24/19 19:14 





  Zofran  IV  





  Q8H PRN  





  Nausea And Vomiting  


 


Sodium Chloride  10 ml  11/24/19 22:00  12/01/19 21:58





  Sodium Chloride Flush Syringe 10 Ml  IV   10 ml





  BID MARIE   Administration


 


Sodium Chloride  10 ml  11/30/19 10:00 





  Sodium Chloride Flush Syringe 10 Ml  IV  





  PRN PRN  





  LINE FLUSH  














Nutrition/Malnutrition Assess





- Dietary Evaluation


Nutrition/Malnutrition Findings: 


                                        





Nutrition Notes                                            Start:  11/25/19 

11:28


Freq:                                                      Status: Active       

 


Protocol:                                                                       

 


 Document     11/27/19 09:36  LM  (Rec: 11/27/19 09:42  LM  -[id number])


 Nutrition Notes


     Initial or Follow up                        Reassessment


     Current Diagnosis                           COPD,Respiratory Failure,


                                                 Malnutrition


     Other Pertinent Diagnosis                   cocaine abuse, fall


     Current Diet                                Vital AF 1.2 at 60 ml/hr


     Labs/Tests                                  


     Pertinent Medications                       Solumedrol


     Height                                      5 ft 8 in


     Weight                                      59.3 kg


     Ideal Body Weight (kg)                      70.00


     BMI                                         19.8


     Subjective/Other Information                Vital AF running at 60 ml/hr.


                                                 Per RN pt is tolerating TF.


     Percent of energy/protein needs met:        99%/100%


     Burn                                        Absent


     Trauma                                      Absent


     Current % PO                                Negligible


     Minimum of two criteria                     Yes


     Body Fat Depletion                          Mild depletion (non-severe)


     Muscle Mass                                 Mild Depletion (non-severe)


     #1


      Nutrition Diagnosis                        Malnutrition


      Diagnosis Progress(for reassessment        Continues


       documentation)                            


     Is patient on ventilator?                   Yes


     Is Patient Ambulatory and/or Out of Bed     No


     REE-([facility]-confined to bed)      [id number]


     Calculation Used for Recommendations        [facility]


     Additional Notes                            Protein needs are 71-119g (1.2


                                                 -2g/kg)


                                                 Fluid needs are 1ml/kcal


 Nutrition Intervention


     Change Diet Order:                          TF


     Nutrition Support:                          Vital 1.2 at 60ml/hr


                                                 Water flush 100ml q4h


     Kcal                                        1,728


     Protein (gm)                                109


     Fluid (mL)                                  1,163


     Goal #1                                     Meet at least 80% of kcal and


                                                 protein needs via TF


     Anticipated Discharge Needs:                Unable to determine at this


                                                 time


     Follow-Up By:                               12/04/19


     Additional Comments                         F/U for TF tolerance

## 2019-12-02 NOTE — PROGRESS NOTE
Assessment and Plan





Stable pulm wise.  Will sign off.





Subjective


Date of service: 12/02/19


Principal diagnosis: Acute respiratory failure


Interval history: 





Stable pulm status.  Currently on Room air.





Objective


                               Vital Signs - 12hr











  12/02/19 12/02/19





  05:13 08:11


 


Temperature 98.4 F 


 


Pulse Rate 64 


 


Pulse Rate [  75





Anterior  





Bilateral  





Throughout]  


 


Respiratory 18 





Rate  


 


Respiratory  16





Rate [Anterior  





Bilateral  





Throughout]  


 


Blood Pressure 92/44 


 


O2 Sat by Pulse 93 96





Oximetry  











Constitutional: no acute distress, alert


Eyes: non-icteric


ENT: oropharynx moist


Neck: supple


Effort: normal


Ascultation: Bilateral: clear, wheezes (w/ prolonged expiratory phase)


Cardiovascular: regular rate and rhythm (no mrg)


Gastrointestinal: normoactive bowel sounds, soft, non-tender, non-distended


Integumentary: normal


Extremities: no cyanosis, no edema, pink and warm


Neurologic: other (Cannot obtain as intubated/sedated)


Psychiatric: other (Cannot obtain as intubated/sedated)


CBC and BMP: 


                                 12/02/19 06:54





                                 12/02/19 06:54


ABG, PT/INR, D-dimer: 


ABG











POC ABG pH  7.418  (7.35-7.45)   11/30/19  10:06    


 


POC ABG pCO2  41.4  (35-45)   11/30/19  10:06    


 


POC ABG pO2  208  ()  H  11/30/19  10:06    


 


POC ABG HCO3  26.7  (22-26 mml/L)   11/30/19  10:06    


 


POC ABG Total CO2  28  (23-27mmol/L)   11/30/19  10:06    


 


POC ABG O2 Sat  100   11/30/19  10:06    





PT/INR, D-dimer











PT  13.9 Sec. (12.2-14.9)   11/30/19  10:57    


 


INR  1.08  (0.87-1.13)   11/30/19  10:57    








Abnormal lab findings: 


                                  Abnormal Labs











  11/24/19 11/24/19 11/24/19





  11:10 11:10 11:10


 


WBC   


 


MCV  99 H  


 


MCH  34 H  


 


RDW  13.1 L  


 


Lymph % (Auto)   


 


Mono % (Auto)  12.1 H  


 


Baso % (Auto)  1.9 H  


 


Lymph #   


 


Mono #  1.2 H  


 


Baso #  0.2 H  


 


Seg Neutrophils %   


 


Seg Neuts % (Manual)   


 


Lymphocytes % (Manual)   


 


Seg Neutrophils #   


 


Seg Neutrophils # Man   


 


PT   30.5 H 


 


INR   3.00 H 


 


Heparin Anti-Xa Level   


 


POC ABG pH   


 


POC ABG pCO2   


 


POC ABG pO2   


 


Sodium   


 


Chloride   


 


Carbon Dioxide    18 L


 


BUN   


 


Glucose    103 H


 


POC Glucose   


 


Phosphorus   


 


AST    58 H


 


Lactate Dehydrogenase   


 


Total Creatine Kinase    215 H


 


CK-MB (CK-2)    4.5 H


 


Albumin    3.1 L


 


Amylase   


 


Lipase    8 L


 


Salicylates   


 


Acetaminophen   














  11/24/19 11/24/19 11/24/19





  11:10 11:10 11:33


 


WBC   


 


MCV   


 


MCH   


 


RDW   


 


Lymph % (Auto)   


 


Mono % (Auto)   


 


Baso % (Auto)   


 


Lymph #   


 


Mono #   


 


Baso #   


 


Seg Neutrophils %   


 


Seg Neuts % (Manual)   


 


Lymphocytes % (Manual)   


 


Seg Neutrophils #   


 


Seg Neutrophils # Man   


 


PT   


 


INR   


 


Heparin Anti-Xa Level   


 


POC ABG pH    7.291 L


 


POC ABG pCO2    49.7 H


 


POC ABG pO2    234 H


 


Sodium   


 


Chloride   


 


Carbon Dioxide   


 


BUN   


 


Glucose   


 


POC Glucose   


 


Phosphorus   


 


AST   


 


Lactate Dehydrogenase   


 


Total Creatine Kinase   


 


CK-MB (CK-2)   


 


Albumin   


 


Amylase   


 


Lipase   


 


Salicylates  < 0.3 L  


 


Acetaminophen   < 5.0 L 














  11/24/19 11/25/19 11/25/19





  19:30 04:18 04:32


 


WBC    4.0 L


 


MCV    100 H


 


MCH    34 H


 


RDW   


 


Lymph % (Auto)    9.6 L


 


Mono % (Auto)   


 


Baso % (Auto)   


 


Lymph #    0.4 L


 


Mono #   


 


Baso #   


 


Seg Neutrophils %    86.4 H


 


Seg Neuts % (Manual)   


 


Lymphocytes % (Manual)   


 


Seg Neutrophils #   


 


Seg Neutrophils # Man   


 


PT   


 


INR   


 


Heparin Anti-Xa Level   


 


POC ABG pH   7.348 L 


 


POC ABG pCO2   


 


POC ABG pO2   108 H 


 


Sodium   


 


Chloride   


 


Carbon Dioxide   


 


BUN   


 


Glucose   


 


POC Glucose   


 


Phosphorus   


 


AST   


 


Lactate Dehydrogenase   


 


Total Creatine Kinase   


 


CK-MB (CK-2)   


 


Albumin  3.2 L  


 


Amylase  438 H  


 


Lipase   


 


Salicylates   


 


Acetaminophen   














  11/25/19 11/30/19 11/30/19





  04:32 09:39 10:06


 


WBC    16.6 H


 


MCV    99 H


 


MCH    33 H


 


RDW    12.3 L


 


Lymph % (Auto)   


 


Mono % (Auto)    11.1 H


 


Baso % (Auto)   


 


Lymph #   


 


Mono #    1.8 H


 


Baso #   


 


Seg Neutrophils %   


 


Seg Neuts % (Manual)   


 


Lymphocytes % (Manual)   


 


Seg Neutrophils #    11.3 H


 


Seg Neutrophils # Man   


 


PT   


 


INR   


 


Heparin Anti-Xa Level   


 


POC ABG pH   


 


POC ABG pCO2   


 


POC ABG pO2   


 


Sodium   


 


Chloride  107.5 H  


 


Carbon Dioxide   


 


BUN   


 


Glucose  118 H  


 


POC Glucose   175 H 


 


Phosphorus   


 


AST  73 H  


 


Lactate Dehydrogenase   


 


Total Creatine Kinase   


 


CK-MB (CK-2)   


 


Albumin  3.2 L  


 


Amylase   


 


Lipase   


 


Salicylates   


 


Acetaminophen   














  11/30/19 11/30/19 11/30/19





  10:06 10:06 13:21


 


WBC   


 


MCV   


 


MCH   


 


RDW   


 


Lymph % (Auto)   


 


Mono % (Auto)   


 


Baso % (Auto)   


 


Lymph #   


 


Mono #   


 


Baso #   


 


Seg Neutrophils %   


 


Seg Neuts % (Manual)   


 


Lymphocytes % (Manual)   


 


Seg Neutrophils #   


 


Seg Neutrophils # Man   


 


PT   


 


INR   


 


Heparin Anti-Xa Level   


 


POC ABG pH   


 


POC ABG pCO2   


 


POC ABG pO2   208 H 


 


Sodium  136 L  


 


Chloride   


 


Carbon Dioxide   


 


BUN  22 H  


 


Glucose   


 


POC Glucose   


 


Phosphorus  1.80 L  


 


AST   


 


Lactate Dehydrogenase  207 H  


 


Total Creatine Kinase  302 H   268 H


 


CK-MB (CK-2)   


 


Albumin   


 


Amylase   


 


Lipase   


 


Salicylates   


 


Acetaminophen   














  11/30/19 12/01/19 12/02/19





  19:20 09:32 06:54


 


WBC   15.2 H  22.3 H


 


MCV   99 H  98 H


 


MCH   33 H  33 H


 


RDW   12.7 L  12.7 L


 


Lymph % (Auto)   


 


Mono % (Auto)   


 


Baso % (Auto)   


 


Lymph #   


 


Mono #   


 


Baso #   


 


Seg Neutrophils %   


 


Seg Neuts % (Manual)   86.0 H 


 


Lymphocytes % (Manual)   9.0 L 


 


Seg Neutrophils #   


 


Seg Neutrophils # Man   13.1 H 


 


PT   


 


INR   


 


Heparin Anti-Xa Level  0.17 L  


 


POC ABG pH   


 


POC ABG pCO2   


 


POC ABG pO2   


 


Sodium   


 


Chloride   


 


Carbon Dioxide   


 


BUN   


 


Glucose   


 


POC Glucose   


 


Phosphorus   


 


AST   


 


Lactate Dehydrogenase   


 


Total Creatine Kinase   


 


CK-MB (CK-2)   


 


Albumin   


 


Amylase   


 


Lipase   


 


Salicylates   


 


Acetaminophen   














  12/02/19





  06:54


 


WBC 


 


MCV 


 


MCH 


 


RDW 


 


Lymph % (Auto) 


 


Mono % (Auto) 


 


Baso % (Auto) 


 


Lymph # 


 


Mono # 


 


Baso # 


 


Seg Neutrophils % 


 


Seg Neuts % (Manual) 


 


Lymphocytes % (Manual) 


 


Seg Neutrophils # 


 


Seg Neutrophils # Man 


 


PT 


 


INR 


 


Heparin Anti-Xa Level 


 


POC ABG pH 


 


POC ABG pCO2 


 


POC ABG pO2 


 


Sodium 


 


Chloride 


 


Carbon Dioxide 


 


BUN 


 


Glucose  133 H


 


POC Glucose 


 


Phosphorus 


 


AST 


 


Lactate Dehydrogenase 


 


Total Creatine Kinase 


 


CK-MB (CK-2) 


 


Albumin 


 


Amylase 


 


Lipase 


 


Salicylates 


 


Acetaminophen

## 2019-12-03 LAB
BASOPHILS # (AUTO): 0 K/MM3 (ref 0–0.1)
BASOPHILS NFR BLD AUTO: 0 % (ref 0–1.8)
EOSINOPHIL # BLD AUTO: 0.1 K/MM3 (ref 0–0.4)
EOSINOPHIL NFR BLD AUTO: 0.4 % (ref 0–4.3)
HCT VFR BLD CALC: 44.8 % (ref 35.5–45.6)
HGB BLD-MCNC: 15.1 GM/DL (ref 11.8–15.2)
LYMPHOCYTES # BLD AUTO: 0.6 K/MM3 (ref 1.2–5.4)
LYMPHOCYTES NFR BLD AUTO: 4.5 % (ref 13.4–35)
MCHC RBC AUTO-ENTMCNC: 34 % (ref 32–34)
MCV RBC AUTO: 100 FL (ref 84–94)
MONOCYTES # (AUTO): 0.7 K/MM3 (ref 0–0.8)
MONOCYTES % (AUTO): 5.2 % (ref 0–7.3)
PLATELET # BLD: 371 K/MM3 (ref 140–440)
RBC # BLD AUTO: 4.5 M/MM3 (ref 3.65–5.03)

## 2019-12-03 RX ADMIN — SERTRALINE SCH MG: 50 TABLET, FILM COATED ORAL at 13:52

## 2019-12-03 RX ADMIN — NICOTINE SCH MG: 7 PATCH TRANSDERMAL at 09:49

## 2019-12-03 RX ADMIN — METHYLPREDNISOLONE SODIUM SUCCINATE SCH MG: 125 INJECTION, POWDER, FOR SOLUTION INTRAMUSCULAR; INTRAVENOUS at 21:44

## 2019-12-03 RX ADMIN — Medication SCH ML: at 21:45

## 2019-12-03 RX ADMIN — NITROGLYCERIN SCH: 20 OINTMENT TOPICAL at 09:50

## 2019-12-03 RX ADMIN — NITROGLYCERIN SCH: 20 OINTMENT TOPICAL at 18:22

## 2019-12-03 RX ADMIN — Medication SCH ML: at 09:52

## 2019-12-03 RX ADMIN — BUDESONIDE SCH: 0.25 INHALANT RESPIRATORY (INHALATION) at 21:20

## 2019-12-03 RX ADMIN — ARFORMOTEROL TARTRATE SCH MCG: 15 SOLUTION RESPIRATORY (INHALATION) at 10:50

## 2019-12-03 RX ADMIN — METHYLPREDNISOLONE SODIUM SUCCINATE SCH: 125 INJECTION, POWDER, FOR SOLUTION INTRAMUSCULAR; INTRAVENOUS at 09:51

## 2019-12-03 RX ADMIN — ARFORMOTEROL TARTRATE SCH: 15 SOLUTION RESPIRATORY (INHALATION) at 21:20

## 2019-12-03 RX ADMIN — FAMOTIDINE SCH MG: 20 TABLET ORAL at 09:50

## 2019-12-03 RX ADMIN — METHYLPREDNISOLONE SODIUM SUCCINATE SCH MG: 125 INJECTION, POWDER, FOR SOLUTION INTRAMUSCULAR; INTRAVENOUS at 17:37

## 2019-12-03 RX ADMIN — BUDESONIDE SCH MG: 0.5 INHALANT RESPIRATORY (INHALATION) at 10:54

## 2019-12-03 RX ADMIN — BUDESONIDE SCH: 0.5 INHALANT RESPIRATORY (INHALATION) at 21:20

## 2019-12-03 RX ADMIN — NITROGLYCERIN SCH: 20 OINTMENT TOPICAL at 13:55

## 2019-12-03 RX ADMIN — BUDESONIDE SCH: 0.25 INHALANT RESPIRATORY (INHALATION) at 10:55

## 2019-12-03 NOTE — PROGRESS NOTE
Assessment and Plan


Assessment and plan: 





Chest pain with resultant Syncope


-pt complained of chest pain then became unresponsive. no evidence of loss of 

pulse and he spontaneously woke up.


-CTA chest negative for PE


-Cardiology consulted- non-cardiac and no further w/u recommended





Acute severe COPD exacerbation


-Resolved





Acute Respiratory failure with hypoxia on mechanical ventilation for >48 hrs, 

now extubated


-Patient is saturating well on room air





?Pharyngeal swelling


-resolved per repeat imaging





Cocaine and tobacco abuse


-Patient counseled on cessation





Leukocytosis


-Likely reactive vs solumedrol induced. 


-Improved and patient is afebrile





Suicidal ideation by falling from a height


-Continue 1013


-Mental health team following





Acute metabolic Encephalopathy


-Resolved





Hypophosphatemia


-We will recheck level in am





Severe Protein Malnutrition


-On oral supplements


-Nutrition following











Disposition: Patient is medically cleared for discharge.  Discharge planning per

Mental health team.





History


Interval history: 





Pt has no new complaints.  He denied chest pain or sob





Hospitalist Physical





- Constitutional


Vitals: 


                                        











Temp Pulse Resp BP Pulse Ox


 


 98.1 F   79   16   93/49   94 


 


 12/03/19 12:30  12/03/19 12:30  12/03/19 12:30  12/03/19 12:30  12/03/19 12:30











General appearance: Present: no acute distress





- EENT


Eyes: Present: PERRL, EOM intact


ENT: hearing intact, clear oral mucosa





- Neck


Neck: Present: supple





- Respiratory


Respiratory effort: normal


Respiratory: bilateral: CTA





- Cardiovascular


Rhythm: regular


Heart Sounds: Present: S1 & S2





- Extremities


Extremities: No edema





- Abdominal


General gastrointestinal: soft, non-tender, non-distended, normal bowel sounds





- Integumentary


Integumentary: Present: warm, dry





- Psychiatric


Psychiatric: cooperative





- Neurologic


Neurologic: CNII-XII intact





Results





- Labs


CBC & Chem 7: 


                                 12/03/19 10:11





                                 12/02/19 06:54


Labs: 


                             Laboratory Last Values











WBC  13.6 K/mm3 (4.5-11.0)  H  12/03/19  10:11    


 


RBC  4.50 M/mm3 (3.65-5.03)   12/03/19  10:11    


 


Hgb  15.1 gm/dl (11.8-15.2)   12/03/19  10:11    


 


Hct  44.8 % (35.5-45.6)   12/03/19  10:11    


 


MCV  100 fl (84-94)  H  12/03/19  10:11    


 


MCH  34 pg (28-32)  H  12/03/19  10:11    


 


MCHC  34 % (32-34)   12/03/19  10:11    


 


RDW  13.2 % (13.2-15.2)   12/03/19  10:11    


 


Plt Count  371 K/mm3 (140-440)   12/03/19  10:11    


 


Lymph % (Auto)  4.5 % (13.4-35.0)  L  12/03/19  10:11    


 


Mono % (Auto)  5.2 % (0.0-7.3)   12/03/19  10:11    


 


Eos % (Auto)  0.4 % (0.0-4.3)   12/03/19  10:11    


 


Baso % (Auto)  0.0 % (0.0-1.8)   12/03/19  10:11    


 


Lymph #  0.6 K/mm3 (1.2-5.4)  L  12/03/19  10:11    


 


Mono #  0.7 K/mm3 (0.0-0.8)   12/03/19  10:11    


 


Eos #  0.1 K/mm3 (0.0-0.4)   12/03/19  10:11    


 


Baso #  0.0 K/mm3 (0.0-0.1)   12/03/19  10:11    


 


Add Manual Diff  Complete   12/01/19  09:32    


 


Total Counted  100   12/01/19  09:32    


 


Seg Neutrophils %  89.9 % (40.0-70.0)  H  12/03/19  10:11    


 


Seg Neuts % (Manual)  86.0 % (40.0-70.0)  H  12/01/19  09:32    


 


Band Neutrophils %  2.0 %  12/01/19  09:32    


 


Lymphocytes % (Manual)  9.0 % (13.4-35.0)  L  12/01/19  09:32    


 


Reactive Lymphs % (Man)  0 %  12/01/19  09:32    


 


Monocytes % (Manual)  3.0 % (0.0-7.3)   12/01/19  09:32    


 


Eosinophils % (Manual)  0 % (0.0-4.3)   12/01/19  09:32    


 


Basophils % (Manual)  0 % (0.0-1.8)   12/01/19  09:32    


 


Metamyelocytes %  0 %  12/01/19  09:32    


 


Myelocytes %  0 %  12/01/19  09:32    


 


Promyelocytes %  0 %  12/01/19  09:32    


 


Blast Cells %  0 %  12/01/19  09:32    


 


Nucleated RBC %  Not Reportable   12/01/19  09:32    


 


Seg Neutrophils #  12.3 K/mm3 (1.8-7.7)  H  12/03/19  10:11    


 


Seg Neutrophils # Man  13.1 K/mm3 (1.8-7.7)  H  12/01/19  09:32    


 


Band Neutrophils #  0.3 K/mm3  12/01/19  09:32    


 


Lymphocytes # (Manual)  1.4 K/mm3 (1.2-5.4)   12/01/19  09:32    


 


Abs React Lymphs (Man)  0.0 K/mm3  12/01/19  09:32    


 


Monocytes # (Manual)  0.5 K/mm3 (0.0-0.8)   12/01/19  09:32    


 


Eosinophils # (Manual)  0.0 K/mm3 (0.0-0.4)   12/01/19  09:32    


 


Basophils # (Manual)  0.0 K/mm3 (0.0-0.1)   12/01/19  09:32    


 


Metamyelocytes #  0.0 K/mm3  12/01/19  09:32    


 


Myelocytes #  0.0 K/mm3  12/01/19  09:32    


 


Promyelocytes #  0.0 K/mm3  12/01/19  09:32    


 


Blast Cells #  0.0 K/mm3  12/01/19  09:32    


 


WBC Morphology  Not Reportable   12/01/19  09:32    


 


Hypersegmented Neuts  Not Reportable   12/01/19  09:32    


 


Hyposegmented Neuts  Not Reportable   12/01/19  09:32    


 


Hypogranular Neuts  Not Reportable   12/01/19  09:32    


 


Smudge Cells  Not Reportable   12/01/19  09:32    


 


Toxic Granulation  Not Reportable   12/01/19  09:32    


 


Toxic Vacuolation  Not Reportable   12/01/19  09:32    


 


Dohle Bodies  Not Reportable   12/01/19  09:32    


 


Pelger-Huet Anomaly  Not Reportable   12/01/19  09:32    


 


Gina Rods  Not Reportable   12/01/19  09:32    


 


Platelet Estimate  Not Reportable   12/01/19  09:32    


 


Clumped Platelets  Not Reportable   12/01/19  09:32    


 


Plt Clumps, EDTA  Not Reportable   12/01/19  09:32    


 


Large Platelets  Not Reportable   12/01/19  09:32    


 


Giant Platelets  Not Reportable   12/01/19  09:32    


 


Platelet Satelliting  Not Reportable   12/01/19  09:32    


 


Plt Morphology Comment  Not Reportable   12/01/19  09:32    


 


RBC Morphology  Not Reportable   12/01/19  09:32    


 


Dimorphic RBCs  Not Reportable   12/01/19  09:32    


 


Polychromasia  Not Reportable   12/01/19  09:32    


 


Hypochromasia  Not Reportable   12/01/19  09:32    


 


Poikilocytosis  Not Reportable   12/01/19  09:32    


 


Anisocytosis  Few   12/01/19  09:32    


 


Microcytosis  Not Reportable   12/01/19  09:32    


 


Macrocytosis  Not Reportable   12/01/19  09:32    


 


Spherocytes  Not Reportable   12/01/19  09:32    


 


Pappenheimer Bodies  Not Reportable   12/01/19  09:32    


 


Sickle Cells  Not Reportable   12/01/19  09:32    


 


Target Cells  Few   12/01/19  09:32    


 


Tear Drop Cells  Not Reportable   12/01/19  09:32    


 


Ovalocytes  Not Reportable   12/01/19  09:32    


 


Helmet Cells  Not Reportable   12/01/19  09:32    


 


[place] Bodies  Not Reportable   12/01/19  09:32    


 


Cabot Rings  Not Reportable   12/01/19  09:32    


 


Kyrie Cells  Not Reportable   12/01/19  09:32    


 


Bite Cells  Not Reportable   12/01/19  09:32    


 


Crenated Cell  Not Reportable   12/01/19  09:32    


 


Elliptocytes  Not Reportable   12/01/19  09:32    


 


Acanthocytes (Spur)  Not Reportable   12/01/19  09:32    


 


Rouleaux  Not Reportable   12/01/19  09:32    


 


Hemoglobin C Crystals  Not Reportable   12/01/19  09:32    


 


Schistocytes  Not Reportable   12/01/19  09:32    


 


Malaria parasites  Not Reportable   12/01/19  09:32    


 


Alan Bodies  Not Reportable   12/01/19  09:32    


 


Hem Pathologist Commnt  No   12/01/19  09:32    


 


PT  13.9 Sec. (12.2-14.9)   11/30/19  10:57    


 


INR  1.08  (0.87-1.13)   11/30/19  10:57    


 


APTT  26.4 Sec. (24.2-36.6)   11/30/19  10:57    


 


Heparin Anti-Xa Level  0.17 U.I./ml (0.3-0.7)  L  11/30/19  19:20    


 


POC ABG pH  7.418  (7.35-7.45)   11/30/19  10:06    


 


POC ABG pCO2  41.4  (35-45)   11/30/19  10:06    


 


POC ABG pO2  208  ()  H  11/30/19  10:06    


 


POC ABG HCO3  26.7  (22-26 mml/L)   11/30/19  10:06    


 


POC ABG Total CO2  28  (23-27mmol/L)   11/30/19  10:06    


 


POC ABG O2 Sat  100   11/30/19  10:06    


 


POC ABG Base Excess  2  ((-2) - (+3)mmol/L)   11/30/19  10:06    


 


FiO2  50 %  11/30/19  10:06    


 


Sodium  140 mmol/L (137-145)   12/02/19  06:54    


 


Potassium  4.8 mmol/L (3.6-5.0)   12/02/19  06:54    


 


Chloride  102.7 mmol/L ()   12/02/19  06:54    


 


Carbon Dioxide  24 mmol/L (22-30)   12/02/19  06:54    


 


Anion Gap  18 mmol/L  12/02/19  06:54    


 


BUN  17 mg/dL (9-20)   12/02/19  06:54    


 


Creatinine  0.8 mg/dL (0.8-1.5)   12/02/19  06:54    


 


Estimated GFR  > 60 ml/min  12/02/19  06:54    


 


BUN/Creatinine Ratio  21 %  12/02/19  06:54    


 


Glucose  133 mg/dL ()  H  12/02/19  06:54    


 


POC Glucose  175  ()  H  11/30/19  09:39    


 


Hemoglobin A1c  5.3 % (4-6)   11/24/19  11:10    


 


Calcium  9.9 mg/dL (8.4-10.2)   12/02/19  06:54    


 


Phosphorus  1.80 mg/dL (2.5-4.5)  L  11/30/19  10:06    


 


Magnesium  2.20 mg/dL (1.7-2.3)   11/30/19  10:06    


 


Total Bilirubin  0.40 mg/dL (0.1-1.2)   11/25/19  04:32    


 


Direct Bilirubin  < 0.2 mg/dL (0-0.2)   11/24/19  11:10    


 


Indirect Bilirubin  0.2 mg/dL  11/24/19  11:10    


 


AST  73 units/L (5-40)  H  11/25/19  04:32    


 


ALT  56 units/L (7-56)   11/25/19  04:32    


 


Alkaline Phosphatase  82 units/L ()   11/25/19  04:32    


 


Ammonia  29.0 umol/L (25-60)   11/24/19  15:02    


 


Lactate Dehydrogenase  207 units/L ()  H  11/30/19  10:06    


 


Total Creatine Kinase  268 units/L ()  H  11/30/19  13:21    


 


CK-MB (CK-2)  1.4 ng/mL (0.0-4.0)   11/30/19  13:21    


 


CK-MB (CK-2) Rel Index  0.5  (0-4)   11/30/19  13:21    


 


Troponin T  < 0.010 ng/mL (0.00-0.029)   11/30/19  15:37    


 


NT-Pro-B Natriuret Pep  33.88 pg/mL (0-450)   11/24/19  11:10    


 


Total Protein  7.4 g/dL (6.3-8.2)   11/25/19  04:32    


 


Albumin  3.2 g/dL (3.9-5)  L  11/25/19  04:32    


 


Albumin/Globulin Ratio  0.8 %  11/25/19  04:32    


 


Amylase  438 units/L ()  H  11/24/19  19:30    


 


Lipase  8 units/L (13-60)  L  11/24/19  11:10    


 


Urine Color  Yellow  (Yellow)   11/24/19  11:59    


 


Urine Turbidity  Clear  (Clear)   11/24/19  11:59    


 


Urine pH  6.0  (5.0-7.0)   11/24/19  11:59    


 


Ur Specific Gravity  1.020  (1.003-1.030)   11/24/19  11:59    


 


Urine Protein  <15 mg/dl mg/dL (Negative)   11/24/19  11:59    


 


Urine Glucose (UA)  Neg mg/dL (Negative)   11/24/19  11:59    


 


Urine Ketones  Tr mg/dL (Negative)   11/24/19  11:59    


 


Urine Blood  Sm  (Negative)   11/24/19  11:59    


 


Urine Nitrite  Neg  (Negative)   11/24/19  11:59    


 


Urine Bilirubin  Neg  (Negative)   11/24/19  11:59    


 


Urine Urobilinogen  4.0 mg/dL (<2.0)   11/24/19  11:59    


 


Ur Leukocyte Esterase  Neg  (Negative)   11/24/19  11:59    


 


Urine WBC (Auto)  3.0 /HPF (0.0-6.0)   11/24/19  11:59    


 


Urine RBC (Auto)  25.0 /HPF (0.0-6.0)   11/24/19  11:59    


 


U Epithel Cells (Auto)  1.0 /HPF (0-13.0)   11/24/19  11:59    


 


Calcium Oxalate Crystal  Few   11/24/19  11:59    


 


Urine Mucus  Few /HPF  11/24/19  11:59    


 


Salicylates  < 0.3 mg/dL (2.8-20.0)  L  11/24/19  11:10    


 


Urine Opiates Screen  Presumptive negative   11/24/19  09:57    


 


Urine Methadone Screen  Presumptive negative   11/24/19  09:57    


 


Acetaminophen  < 5.0 ug/mL (10.0-30.0)  L  11/24/19  11:10    


 


Ur Barbiturates Screen  Presumptive negative   11/24/19  09:57    


 


Ur Phencyclidine Scrn  Presumptive negative   11/24/19  09:57    


 


Ur Amphetamines Screen  Presumptive negative   11/24/19  09:57    


 


U Benzodiazepines Scrn  Presumptive positive   11/24/19  09:57    


 


Urine Cocaine Screen  Presumptive positive   11/24/19  09:57    


 


U Marijuana (THC) Screen  Presumptive negative   11/24/19  09:57    


 


Drugs of Abuse Note  Disclamer   11/24/19  09:57    


 


Plasma/Serum Alcohol  < 0.01 % (0-0.07)   11/24/19  11:10    


 


Blood Type  O POSITIVE   11/24/19  10:05    


 


Antibody Screen  Negative   11/24/19  10:05    














Active Medications





- Current Medications


Current Medications: 














Generic Name Dose Route Start Last Admin





  Trade Name Freq  PRN Reason Stop Dose Admin


 


Acetaminophen  650 mg  11/24/19 19:14  11/28/19 13:02





  Tylenol  PO   650 mg





  Q4H PRN   Administration





  Pain MILD(1-3)/Fever >100.5/HA  


 


Albuterol  2.5 mg  11/24/19 19:53  11/30/19 10:09





  Proventil  IH   2.5 mg





  Q4HRT PRN   Administration





  Shortness Of Breath  


 


Arformoterol Tartrate  15 mcg  11/28/19 10:00  12/03/19 10:50





  Brovana Nebu  IH   15 mcg





  Q12HRT MARIE   Administration


 


Budesonide  0.5 mg  11/28/19 16:44  12/03/19 10:55





  Pulmicort  IH   Not Given





  Q12HRT MARIE  


 


Budesonide  0.5 mg  12/03/19 20:00  12/03/19 10:54





  Pulmicort  IH   0.5 mg





  Q12HRT MARIE   Administration


 


Clonazepam  0.5 mg  11/26/19 22:00  12/03/19 09:50





  Klonopin  PO   0.5 mg





  BID MARIE   Administration


 


Famotidine  20 mg  11/30/19 15:00  12/03/19 09:50





  Pepcid  PO   20 mg





  QDAY MARIE   Administration


 


Hydrophilic Ointment  1 applic  11/24/19 09:47 





  Vaseline Lip Therapy  TP  





  Q2HR PRN  





  Dry Lips  


 


Lorazepam  2 mg  11/24/19 19:30  11/29/19 14:55





  Ativan  IV   2 mg





  Q1H PRN   Administration





  CIWA-Ar 8-15  


 


Lorazepam  4 mg  11/24/19 19:30  11/28/19 07:06





  Ativan  IV   4 mg





  Q1H PRN   Administration





  CIWA-Ar 16-25  


 


Methylprednisolone Sodium Succinate  40 mg  11/24/19 22:00  12/03/19 09:51





  Solu-Medrol  IV   Not Given





  Q8HR MARIE  


 


Multi-Ingred Cream/Lotion/Oil/Oint  1 applic  11/24/19 09:47 





  Artificial Tears Ophth Oint  OU  





  Q4HR PRN  





  Dry Eye(s)  


 


Nicotine  7 mg  11/28/19 10:00  12/03/19 09:49





  Habitrol  TD   7 mg





  QDAY MARIE   Administration


 


Nitroglycerin  0.5 inch  11/30/19 12:00  12/03/19 13:55





  Nitro-Bid 2%  TP   Not Given





  [facility]  





  Protocol  


 


Ondansetron HCl  4 mg  11/24/19 19:14 





  Zofran  IV  





  Q8H PRN  





  Nausea And Vomiting  


 


Sertraline HCl  50 mg  12/03/19 11:00  12/03/19 13:52





  Zoloft  PO   50 mg





  QDAY MARIE   Administration


 


Sodium Chloride  10 ml  11/24/19 22:00  12/03/19 09:52





  Sodium Chloride Flush Syringe 10 Ml  IV   10 ml





  BID MARIE   Administration


 


Sodium Chloride  10 ml  11/30/19 10:00 





  Sodium Chloride Flush Syringe 10 Ml  IV  





  PRN PRN  





  LINE FLUSH  














Nutrition/Malnutrition Assess





- Dietary Evaluation


Nutrition/Malnutrition Findings: 


                                        





Nutrition Notes                                            Start:  11/25/19 

11:28


Freq:                                                      Status: Active       




Protocol:                                                                       




 Document     11/27/19 09:36  LM  (Rec: 11/27/19 09:42  LM  [id number])


 Nutrition Notes


     Initial or Follow up                        Reassessment


     Current Diagnosis                           COPD,Respiratory Failure,


                                                 Malnutrition


     Other Pertinent Diagnosis                   cocaine abuse, fall


     Current Diet                                Vital AF 1.2 at 60 ml/hr


     Labs/Tests                                  


     Pertinent Medications                       Solumedrol


     Height                                      5 ft 8 in


     Weight                                      59.3 kg


     Ideal Body Weight (kg)                      70.00


     BMI                                         19.8


     Subjective/Other Information                Vital AF running at 60 ml/hr.


                                                 Per RN pt is tolerating TF.


     Percent of energy/protein needs met:        99%/100%


     Burn                                        Absent


     Trauma                                      Absent


     Current % PO                                Negligible


     Minimum of two criteria                     Yes


     Body Fat Depletion                          Mild depletion (non-severe)


     Muscle Mass                                 Mild Depletion (non-severe)


     #1


      Nutrition Diagnosis                        Malnutrition


      Diagnosis Progress(for reassessment        Continues


       documentation)                            


     Is patient on ventilator?                   Yes


     Is Patient Ambulatory and/or Out of Bed     No


     REE-([facility]-confined to bed)      [phone]


     Calculation Used for Recommendations        [facility]


     Additional Notes                            Protein needs are 71-119g (1.2


                                                 -2g/kg)


                                                 Fluid needs are 1ml/kcal


 Nutrition Intervention


     Change Diet Order:                          TF


     Nutrition Support:                          Vital 1.2 at 60ml/hr


                                                 Water flush 100ml q4h


     Kcal                                        1,728


     Protein (gm)                                109


     Fluid (mL)                                  1,163


     Goal #1                                     Meet at least 80% of kcal and


                                                 protein needs via TF


     Anticipated Discharge Needs:                Unable to determine at this


                                                 time


     Follow-Up By:                               12/04/19


     Additional Comments                         F/U for TF tolerance

## 2019-12-03 NOTE — PROGRESS NOTE
Subjective





- Reason for Consult


Consult date: 19


Reason for consult: Psychiatry Follow-up





- Chief Complaint


Chief complaint: 


"I was high on drugs and upset"





48 y.o. AA male who presented to the ER for falling down a embankment. Per the 

record, the patient was attempting to jump from a bridge before he fell (suicide

attempt). Today the patient was emotional during the assessment. He stated that 

he have a lot of stressors at this time that haven't changed. He stated that he 

wanted to kill himself before he fell down the embankment prior to his arrival 

to the ER. He stated that he was dealing with issues with his girlfriend along 

with "fighting against using drugs." He stated that he has a 30 yr hx of 

substance abuse. He stated that he was notified yesterday that his uncle . 

He stated, "It's hard for me right now." He rate his depression 6/10, with 10 

being the worse. He denies any previous suicide attempt when asked. He denies 

SI/HI's and AVH's. He denies erratic sleep and a poor appetite. He denies 

alcohol consumption (etoh). 





Mental Status Exam





- Vital signs


                                Last Vital Signs











Temp  98.5 F   19 04:19


 


Pulse  91 H  19 09:50


 


Resp  14   19 04:19


 


BP  108/66   19 09:50


 


Pulse Ox  93   19 04:19














- Exam


Narrative exam: 


MSE:





 Appearance: in hospital attire 


 Behavior: regular eye contact    


 Speech: regular rate and tone 


 Mood: emotional


 Affect: congruent to mood 


 Thought Process: circumstantial       


 Thought Content: denies SI/HI's and AVH's  


 Motor Activity: sitting up in bed


 Cognition: A/O x3


 Insight: variable 


 Judgment: poor      












































Assessment and Plan


Impression: MDD. Substance Use DO (cocaine). Intentional suicide attempt. Today 

the patient was emotional during the assessment. No acute withdrawals noted 

(etoh).





DDx: R/O Bipolar DO, Substance Induced Mood DO





Recommendation/Plan: Extend 1013 and recommend collateral information from 

family before the 1013 is rescinded. Start Zoloft 50 mg PO daily for depression.

Discussed possible suicidality/medication induced daniel with the patient 

reference Zoloft, he verbalized understanding. CIWA?





Will staff with Dr KIET Henson.

## 2019-12-04 VITALS — SYSTOLIC BLOOD PRESSURE: 112 MMHG | DIASTOLIC BLOOD PRESSURE: 54 MMHG

## 2019-12-04 RX ADMIN — NITROGLYCERIN SCH: 20 OINTMENT TOPICAL at 12:00

## 2019-12-04 RX ADMIN — METHYLPREDNISOLONE SODIUM SUCCINATE SCH MG: 125 INJECTION, POWDER, FOR SOLUTION INTRAMUSCULAR; INTRAVENOUS at 15:15

## 2019-12-04 RX ADMIN — BUDESONIDE SCH: 0.25 INHALANT RESPIRATORY (INHALATION) at 08:41

## 2019-12-04 RX ADMIN — BUDESONIDE SCH: 0.5 INHALANT RESPIRATORY (INHALATION) at 08:40

## 2019-12-04 RX ADMIN — NICOTINE SCH MG: 7 PATCH TRANSDERMAL at 09:46

## 2019-12-04 RX ADMIN — FAMOTIDINE SCH MG: 20 TABLET ORAL at 09:46

## 2019-12-04 RX ADMIN — NITROGLYCERIN SCH: 20 OINTMENT TOPICAL at 04:59

## 2019-12-04 RX ADMIN — SERTRALINE SCH MG: 50 TABLET, FILM COATED ORAL at 09:46

## 2019-12-04 RX ADMIN — Medication SCH ML: at 09:47

## 2019-12-04 RX ADMIN — ARFORMOTEROL TARTRATE SCH: 15 SOLUTION RESPIRATORY (INHALATION) at 08:39

## 2019-12-04 RX ADMIN — METHYLPREDNISOLONE SODIUM SUCCINATE SCH MG: 125 INJECTION, POWDER, FOR SOLUTION INTRAMUSCULAR; INTRAVENOUS at 04:59

## 2019-12-04 NOTE — PROGRESS NOTE
Subjective





- Reason for Consult


Consult date: 12/04/19


Reason for consult: Psychiatry Follow-up





- Chief Complaint


Chief complaint: 


"I was high on drugs and upset"





The patient is 48 year old single unemployed  man who presented 

to the ER after falling down an embankment. Per medical record, the patient was 

attempting to jump from a bridge before he fell (suicide attempt). 





Today the patient Patient describes a good and stable mood, denies being 

depressed or excessively nervous. Patient eats and sleeps well. Patient denies 

panic attacks, recurrent nightmares or flashbacks. Patient denies symptoms 

suggestive of OCD or PTSD. Patient denies hallucinations, paranoia, thought 

interference and no features suggestive of hypomania or daniel. Patient 

completely denies suicidal or homicidal thoughts. He feels safe being discharged

home. 





Mental Status Exam





- Vital signs


                                Last Vital Signs











Temp  97.9 F   12/04/19 04:51


 


Pulse  65   12/04/19 04:51


 


Resp  16   12/04/19 04:51


 


BP  99/56   12/04/19 04:51


 


Pulse Ox  98   12/04/19 04:51














- Exam


Orientation: time, place, person


Affect: normal


Mood: appropriate, congruent with affect


Thought Process: Intact


Perceptions: none


Speech: normal rate and pattern


Concentration: focused


Motor activity: normal


Level of consciousness: alert


Memory: Intact


Sleep Symptoms: None


Interaction: cooperative





Assessment and Plan





- Patient Problems


(1) Cocaine abuse


Current Visit: Yes   Status: Acute   





(2) Cocaine use disorder, severe, dependence


Current Visit: Yes   Status: Acute   





(3) Psychoactive substance-induced mood disorder


Current Visit: Yes   Status: Acute   





(4) Alcohol use disorder, severe, dependence


Current Visit: Yes   Status: Acute   


Plan to address problem: 


Recommendation/Plan: 


Rescind 1013 as patient completely denies being suicidal/homicidal, not 

psychotic or intoxicated


He wants to be discharged home and is not interested in Substance abuse 

treatment. 


Continue Zoloft 50 mg PO daily for depression.


Provide outpatient resources please.

## 2019-12-04 NOTE — PROGRESS NOTE
Assessment and Plan


Assessment and plan: 





Chest pain with resultant Syncope


-pt complained of chest pain then became unresponsive. no evidence of loss of 

pulse and he spontaneously woke up.


-CTA chest negative for PE


-Cardiology consulted- non-cardiac chest pain and no further w/u recommended





Acute severe COPD exacerbation


-Resolved





Acute Respiratory failure with hypoxia on mechanical ventilation for >48 hrs, 

now extubated


-Patient is saturating well on room air





?Pharyngeal swelling


-resolved per repeat imaging





Cocaine and tobacco abuse


-Patient counseled on cessation





Leukocytosis


-Likely reactive vs solumedrol induced. 


-Improved and patient is afebrile





Suicidal ideation by falling from a height


-Continue 1013 and sitter


-Mental health team following





Acute metabolic Encephalopathy


-Resolved





Hypophosphatemia


-Resolved





Severe Protein Malnutrition


-On oral supplements


-Nutrition following











Disposition: Patient is medically cleared for discharge.  Discharge planning per

the Mental health team.





History


Interval history: 





Pt has no new complaints.  He denied chest pain or sob





Hospitalist Physical





- Constitutional


Vitals: 


                                        











Temp Pulse Resp BP Pulse Ox


 


 97.9 F   65   20   99/56   98 


 


 12/04/19 04:51  12/04/19 04:51  12/04/19 11:28  12/04/19 04:51  12/04/19 11:28











General appearance: Present: no acute distress





- EENT


Eyes: Present: PERRL, EOM intact


ENT: hearing intact, clear oral mucosa





- Neck


Neck: Present: supple





- Respiratory


Respiratory effort: normal


Respiratory: bilateral: CTA





- Cardiovascular


Rhythm: regular


Heart Sounds: Present: S1 & S2





- Extremities


Extremities: No edema





- Abdominal


General gastrointestinal: soft, non-tender, normal bowel sounds





- Integumentary


Integumentary: Present: warm, dry





- Psychiatric


Psychiatric: cooperative





- Neurologic


Neurologic: CNII-XII intact





Results





- Labs


CBC & Chem 7: 


                                 12/03/19 10:11





                                 12/02/19 06:54


Labs: 


                             Laboratory Last Values











WBC  13.6 K/mm3 (4.5-11.0)  H  12/03/19  10:11    


 


RBC  4.50 M/mm3 (3.65-5.03)   12/03/19  10:11    


 


Hgb  15.1 gm/dl (11.8-15.2)   12/03/19  10:11    


 


Hct  44.8 % (35.5-45.6)   12/03/19  10:11    


 


MCV  100 fl (84-94)  H  12/03/19  10:11    


 


MCH  34 pg (28-32)  H  12/03/19  10:11    


 


MCHC  34 % (32-34)   12/03/19  10:11    


 


RDW  13.2 % (13.2-15.2)   12/03/19  10:11    


 


Plt Count  371 K/mm3 (140-440)   12/03/19  10:11    


 


Lymph % (Auto)  4.5 % (13.4-35.0)  L  12/03/19  10:11    


 


Mono % (Auto)  5.2 % (0.0-7.3)   12/03/19  10:11    


 


Eos % (Auto)  0.4 % (0.0-4.3)   12/03/19  10:11    


 


Baso % (Auto)  0.0 % (0.0-1.8)   12/03/19  10:11    


 


Lymph #  0.6 K/mm3 (1.2-5.4)  L  12/03/19  10:11    


 


Mono #  0.7 K/mm3 (0.0-0.8)   12/03/19  10:11    


 


Eos #  0.1 K/mm3 (0.0-0.4)   12/03/19  10:11    


 


Baso #  0.0 K/mm3 (0.0-0.1)   12/03/19  10:11    


 


Add Manual Diff  Complete   12/01/19  09:32    


 


Total Counted  100   12/01/19  09:32    


 


Seg Neutrophils %  89.9 % (40.0-70.0)  H  12/03/19  10:11    


 


Seg Neuts % (Manual)  86.0 % (40.0-70.0)  H  12/01/19  09:32    


 


Band Neutrophils %  2.0 %  12/01/19  09:32    


 


Lymphocytes % (Manual)  9.0 % (13.4-35.0)  L  12/01/19  09:32    


 


Reactive Lymphs % (Man)  0 %  12/01/19  09:32    


 


Monocytes % (Manual)  3.0 % (0.0-7.3)   12/01/19  09:32    


 


Eosinophils % (Manual)  0 % (0.0-4.3)   12/01/19  09:32    


 


Basophils % (Manual)  0 % (0.0-1.8)   12/01/19  09:32    


 


Metamyelocytes %  0 %  12/01/19  09:32    


 


Myelocytes %  0 %  12/01/19  09:32    


 


Promyelocytes %  0 %  12/01/19  09:32    


 


Blast Cells %  0 %  12/01/19  09:32    


 


Nucleated RBC %  Not Reportable   12/01/19  09:32    


 


Seg Neutrophils #  12.3 K/mm3 (1.8-7.7)  H  12/03/19  10:11    


 


Seg Neutrophils # Man  13.1 K/mm3 (1.8-7.7)  H  12/01/19  09:32    


 


Band Neutrophils #  0.3 K/mm3  12/01/19  09:32    


 


Lymphocytes # (Manual)  1.4 K/mm3 (1.2-5.4)   12/01/19  09:32    


 


Abs React Lymphs (Man)  0.0 K/mm3  12/01/19  09:32    


 


Monocytes # (Manual)  0.5 K/mm3 (0.0-0.8)   12/01/19  09:32    


 


Eosinophils # (Manual)  0.0 K/mm3 (0.0-0.4)   12/01/19  09:32    


 


Basophils # (Manual)  0.0 K/mm3 (0.0-0.1)   12/01/19  09:32    


 


Metamyelocytes #  0.0 K/mm3  12/01/19  09:32    


 


Myelocytes #  0.0 K/mm3  12/01/19  09:32    


 


Promyelocytes #  0.0 K/mm3  12/01/19  09:32    


 


Blast Cells #  0.0 K/mm3  12/01/19  09:32    


 


WBC Morphology  Not Reportable   12/01/19  09:32    


 


Hypersegmented Neuts  Not Reportable   12/01/19  09:32    


 


Hyposegmented Neuts  Not Reportable   12/01/19  09:32    


 


Hypogranular Neuts  Not Reportable   12/01/19  09:32    


 


Smudge Cells  Not Reportable   12/01/19  09:32    


 


Toxic Granulation  Not Reportable   12/01/19  09:32    


 


Toxic Vacuolation  Not Reportable   12/01/19  09:32    


 


Dohle Bodies  Not Reportable   12/01/19  09:32    


 


Pelger-Huet Anomaly  Not Reportable   12/01/19  09:32    


 


Gina Rods  Not Reportable   12/01/19  09:32    


 


Platelet Estimate  Not Reportable   12/01/19  09:32    


 


Clumped Platelets  Not Reportable   12/01/19  09:32    


 


Plt Clumps, EDTA  Not Reportable   12/01/19  09:32    


 


Large Platelets  Not Reportable   12/01/19  09:32    


 


Giant Platelets  Not Reportable   12/01/19  09:32    


 


Platelet Satelliting  Not Reportable   12/01/19  09:32    


 


Plt Morphology Comment  Not Reportable   12/01/19  09:32    


 


RBC Morphology  Not Reportable   12/01/19  09:32    


 


Dimorphic RBCs  Not Reportable   12/01/19  09:32    


 


Polychromasia  Not Reportable   12/01/19  09:32    


 


Hypochromasia  Not Reportable   12/01/19  09:32    


 


Poikilocytosis  Not Reportable   12/01/19  09:32    


 


Anisocytosis  Few   12/01/19  09:32    


 


Microcytosis  Not Reportable   12/01/19  09:32    


 


Macrocytosis  Not Reportable   12/01/19  09:32    


 


Spherocytes  Not Reportable   12/01/19  09:32    


 


Pappenheimer Bodies  Not Reportable   12/01/19  09:32    


 


Sickle Cells  Not Reportable   12/01/19  09:32    


 


Target Cells  Few   12/01/19  09:32    


 


Tear Drop Cells  Not Reportable   12/01/19  09:32    


 


Ovalocytes  Not Reportable   12/01/19  09:32    


 


Helmet Cells  Not Reportable   12/01/19  09:32    


 


Sapp-[place] Bodies  Not Reportable   12/01/19  09:32    


 


Cabot Rings  Not Reportable   12/01/19  09:32    


 


Kyrie Cells  Not Reportable   12/01/19  09:32    


 


Bite Cells  Not Reportable   12/01/19  09:32    


 


Crenated Cell  Not Reportable   12/01/19  09:32    


 


Elliptocytes  Not Reportable   12/01/19  09:32    


 


Acanthocytes (Spur)  Not Reportable   12/01/19  09:32    


 


Rouleaux  Not Reportable   12/01/19  09:32    


 


Hemoglobin C Crystals  Not Reportable   12/01/19  09:32    


 


Schistocytes  Not Reportable   12/01/19  09:32    


 


Malaria parasites  Not Reportable   12/01/19  09:32    


 


Alan Bodies  Not Reportable   12/01/19  09:32    


 


Hem Pathologist Commnt  No   12/01/19  09:32    


 


PT  13.9 Sec. (12.2-14.9)   11/30/19  10:57    


 


INR  1.08  (0.87-1.13)   11/30/19  10:57    


 


APTT  26.4 Sec. (24.2-36.6)   11/30/19  10:57    


 


Heparin Anti-Xa Level  0.17 U.I./ml (0.3-0.7)  L  11/30/19  19:20    


 


POC ABG pH  7.418  (7.35-7.45)   11/30/19  10:06    


 


POC ABG pCO2  41.4  (35-45)   11/30/19  10:06    


 


POC ABG pO2  208  ()  H  11/30/19  10:06    


 


POC ABG HCO3  26.7  (22-26 mml/L)   11/30/19  10:06    


 


POC ABG Total CO2  28  (23-27mmol/L)   11/30/19  10:06    


 


POC ABG O2 Sat  100   11/30/19  10:06    


 


POC ABG Base Excess  2  ((-2) - (+3)mmol/L)   11/30/19  10:06    


 


FiO2  50 %  11/30/19  10:06    


 


Sodium  140 mmol/L (137-145)   12/02/19  06:54    


 


Potassium  4.8 mmol/L (3.6-5.0)   12/02/19  06:54    


 


Chloride  102.7 mmol/L ()   12/02/19  06:54    


 


Carbon Dioxide  24 mmol/L (22-30)   12/02/19  06:54    


 


Anion Gap  18 mmol/L  12/02/19  06:54    


 


BUN  17 mg/dL (9-20)   12/02/19  06:54    


 


Creatinine  0.8 mg/dL (0.8-1.5)   12/02/19  06:54    


 


Estimated GFR  > 60 ml/min  12/02/19  06:54    


 


BUN/Creatinine Ratio  21 %  12/02/19  06:54    


 


Glucose  133 mg/dL ()  H  12/02/19  06:54    


 


POC Glucose  175  ()  H  11/30/19  09:39    


 


Hemoglobin A1c  5.3 % (4-6)   11/24/19  11:10    


 


Calcium  9.9 mg/dL (8.4-10.2)   12/02/19  06:54    


 


Phosphorus  2.50 mg/dL (2.5-4.5)   12/04/19  04:40    


 


Magnesium  2.20 mg/dL (1.7-2.3)   11/30/19  10:06    


 


Total Bilirubin  0.40 mg/dL (0.1-1.2)   11/25/19  04:32    


 


Direct Bilirubin  < 0.2 mg/dL (0-0.2)   11/24/19  11:10    


 


Indirect Bilirubin  0.2 mg/dL  11/24/19  11:10    


 


AST  73 units/L (5-40)  H  11/25/19  04:32    


 


ALT  56 units/L (7-56)   11/25/19  04:32    


 


Alkaline Phosphatase  82 units/L ()   11/25/19  04:32    


 


Ammonia  29.0 umol/L (25-60)   11/24/19  15:02    


 


Lactate Dehydrogenase  207 units/L ()  H  11/30/19  10:06    


 


Total Creatine Kinase  268 units/L ()  H  11/30/19  13:21    


 


CK-MB (CK-2)  1.4 ng/mL (0.0-4.0)   11/30/19  13:21    


 


CK-MB (CK-2) Rel Index  0.5  (0-4)   11/30/19  13:21    


 


Troponin T  < 0.010 ng/mL (0.00-0.029)   11/30/19  15:37    


 


NT-Pro-B Natriuret Pep  33.88 pg/mL (0-450)   11/24/19  11:10    


 


Total Protein  7.4 g/dL (6.3-8.2)   11/25/19  04:32    


 


Albumin  3.2 g/dL (3.9-5)  L  11/25/19  04:32    


 


Albumin/Globulin Ratio  0.8 %  11/25/19  04:32    


 


Amylase  438 units/L ()  H  11/24/19  19:30    


 


Lipase  8 units/L (13-60)  L  11/24/19  11:10    


 


Urine Color  Yellow  (Yellow)   11/24/19  11:59    


 


Urine Turbidity  Clear  (Clear)   11/24/19  11:59    


 


Urine pH  6.0  (5.0-7.0)   11/24/19  11:59    


 


Ur Specific Gravity  1.020  (1.003-1.030)   11/24/19  11:59    


 


Urine Protein  <15 mg/dl mg/dL (Negative)   11/24/19  11:59    


 


Urine Glucose (UA)  Neg mg/dL (Negative)   11/24/19  11:59    


 


Urine Ketones  Tr mg/dL (Negative)   11/24/19  11:59    


 


Urine Blood  Sm  (Negative)   11/24/19  11:59    


 


Urine Nitrite  Neg  (Negative)   11/24/19  11:59    


 


Urine Bilirubin  Neg  (Negative)   11/24/19  11:59    


 


Urine Urobilinogen  4.0 mg/dL (<2.0)   11/24/19  11:59    


 


Ur Leukocyte Esterase  Neg  (Negative)   11/24/19  11:59    


 


Urine WBC (Auto)  3.0 /HPF (0.0-6.0)   11/24/19  11:59    


 


Urine RBC (Auto)  25.0 /HPF (0.0-6.0)   11/24/19  11:59    


 


U Epithel Cells (Auto)  1.0 /HPF (0-13.0)   11/24/19  11:59    


 


Calcium Oxalate Crystal  Few   11/24/19  11:59    


 


Urine Mucus  Few /HPF  11/24/19  11:59    


 


Salicylates  < 0.3 mg/dL (2.8-20.0)  L  11/24/19  11:10    


 


Urine Opiates Screen  Presumptive negative   11/24/19  09:57    


 


Urine Methadone Screen  Presumptive negative   11/24/19  09:57    


 


Acetaminophen  < 5.0 ug/mL (10.0-30.0)  L  11/24/19  11:10    


 


Ur Barbiturates Screen  Presumptive negative   11/24/19  09:57    


 


Ur Phencyclidine Scrn  Presumptive negative   11/24/19  09:57    


 


Ur Amphetamines Screen  Presumptive negative   11/24/19  09:57    


 


U Benzodiazepines Scrn  Presumptive positive   11/24/19  09:57    


 


Urine Cocaine Screen  Presumptive positive   11/24/19  09:57    


 


U Marijuana (THC) Screen  Presumptive negative   11/24/19  09:57    


 


Drugs of Abuse Note  Disclamer   11/24/19  09:57    


 


Plasma/Serum Alcohol  < 0.01 % (0-0.07)   11/24/19  11:10    


 


Blood Type  O POSITIVE   11/24/19  10:05    


 


Antibody Screen  Negative   11/24/19  10:05    














Active Medications





- Current Medications


Current Medications: 














Generic Name Dose Route Start Last Admin





  Trade Name Freq  PRN Reason Stop Dose Admin


 


Acetaminophen  650 mg  11/24/19 19:14  11/28/19 13:02





  Tylenol  PO   650 mg





  Q4H PRN   Administration





  Pain MILD(1-3)/Fever >100.5/HA  


 


Albuterol  2.5 mg  11/24/19 19:53  11/30/19 10:09





  Proventil  IH   2.5 mg





  Q4HRT PRN   Administration





  Shortness Of Breath  


 


Arformoterol Tartrate  15 mcg  11/28/19 10:00  12/03/19 21:20





  Brovana Nebu  IH   Not Given





  [facility]12[facility]RT [facility]  


 


Budesonide  0.5 mg  12/03/19 20:00  12/03/19 21:20





  Pulmicort  IH   Not Given





  Q12HRT MARIE  


 


Clonazepam  0.5 mg  11/26/19 22:00  12/04/19 09:46





  Klonopin  PO   0.5 mg





  BID MARIE   Administration


 


Famotidine  20 mg  11/30/19 15:00  12/04/19 09:46





  Pepcid  PO   20 mg





  QDAY [facility]   Administration


 


Hydrophilic Ointment  1 applic  11/24/19 09:47 





  Vaseline Lip Therapy  TP  





  Q2HR PRN  





  Dry Lips  


 


Lorazepam  2 mg  11/24/19 19:30  11/29/19 14:55





  Ativan  IV   2 mg





  Q1H PRN   Administration





  CIWA-Ar 8-15  


 


Lorazepam  4 mg  11/24/19 19:30  11/28/19 07:06





  Ativan  IV   4 mg





  Q1H PRN   Administration





  CIWA-Ar 16-25  


 


Methylprednisolone Sodium Succinate  40 mg  11/24/19 22:00  12/04/19 04:59





  Solu-Medrol  IV   40 mg





  Q8HR [facility]   Administration


 


Multi-Ingred Cream/Lotion/Oil/Oint  1 applic  11/24/19 09:47 





  Artificial Tears Ophth Oint  OU  





  Q4HR PRN  





  Dry Eye(s)  


 


Nicotine  7 mg  11/28/19 10:00  12/04/19 09:46





  Habitrol  TD   7 mg





  QDAY [facility]   Administration


 


Nitroglycerin  0.5 inch  11/30/19 12:00  12/04/19 04:59





  Nitro-Bid 2%  TP   Not Given





  [facility]  





  Protocol  


 


Ondansetron HCl  4 mg  11/24/19 19:14 





  Zofran  IV  





  Q8H PRN  





  Nausea And Vomiting  


 


Sertraline HCl  50 mg  12/03/19 11:00  12/04/19 09:46





  Zoloft  PO   50 mg





  QDAY MARIE   Administration


 


Sodium Chloride  10 ml  11/24/19 22:00  12/04/19 09:47





  Sodium Chloride Flush Syringe 10 Ml  IV   10 ml





  BID MARIE   Administration


 


Sodium Chloride  10 ml  11/30/19 10:00 





  Sodium Chloride Flush Syringe 10 Ml  IV  





  PRN PRN  





  LINE FLUSH  














Nutrition/Malnutrition Assess





- Dietary Evaluation


Nutrition/Malnutrition Findings: 


                                        





Nutrition Notes                                            Start:  11/25/19 

11:28


Freq:                                                      Status: Active       




Protocol:                                                                       




 Document     12/04/19 12:32  CT  (Rec: 12/04/19 12:36  CT  [id number])


 Co-Sign      12/04/19 12:32  LP


 Nutrition Notes


     Initial or Follow up                        Reassessment


     Current Diagnosis                           COPD,Respiratory Failure,


                                                 Malnutrition


     Other Pertinent Diagnosis                   cocaine abuse, fall,


                                                 leukocytosis, suicidal


     Current Diet                                Regular


     Labs/Tests                                  Glu 133


     Pertinent Medications                       Solumedrol


     Height                                      5 ft 8 in


     Weight                                      54.7 kg


     Ideal Body Weight (kg)                      70.00


     BMI                                         18.3


     Subjective/Other Information                Pt diet advanced to regular


                                                 diet. Pt stated that he is


                                                 eating all of his meals. Noted


                                                 snacks on his table.  Pt


                                                 states he has a good appetite


                                                 and does not have any issues


                                                 chewing or swallowing.  Noted


                                                 slight temporal/orbital


                                                 wasting.


     Percent of energy/protein needs met:        100% energy / 100% protein


     Burn                                        Absent


     Trauma                                      Absent


     GI Symptoms                                 None


     Current % PO                                Good (%)


     Minimum of two criteria                     Yes


     Body Fat Depletion                          Mild depletion (non-severe)


     Muscle Mass                                 Mild Depletion (non-severe)


     #1


      Nutrition Diagnosis                        Malnutrition


      Diagnosis Progress(for reassessment        Continues


       documentation)                            


     Is patient on ventilator?                   Yes


     Is Patient Ambulatory and/or Out of Bed     No


     REE-([place] Jeor-confined to bed)      4501.348


     Calculation Used for Recommendations        [place] or


     Additional Notes                            Protein needs are 71-119g (1.2


                                                 -2g/kg)


                                                 Fluid needs are 1ml/kcal


 Nutrition Intervention


     Change Diet Order:                          Continue Regular diet


     Goal #1                                     Meet at least 80% of kcal and


                                                 protein needs via TF


     Anticipated Discharge Needs:                Regular Diet


     Revisit per MD consult or patient           Sign Off


      request:

## 2019-12-05 NOTE — DISCHARGE SUMMARY
Providers





- Providers


Date of Admission: 


11/24/19 12:42





Date of discharge: 12/04/19


Attending physician: 


CHRISTIAN JAVIER





                                        





11/24/19


Consult to Case Management [CONS] Routine 


   Services Needed at Discharge: 


   Notified:: Beronica Parsons


   Phone number called:: in person


   Was contact made?: Yes


   If yes, spoke with:: Beronica Parsons


   Time called:: 10:15





11/24/19 09:47


Consult to Dietitian/Nutrition [CONS] Routine 


   Physician Instructions: 


   Reason For Exam: 


   Reason for Consult: Evaluate nutritional intake





11/24/19 19:08


Consult to Dietitian/Nutrition [CONS] Routine 


   Physician Instructions: Tube feeding


   Reason For Exam: Intubated


   Reason for Consult: Pt needs oral supplement





11/24/19 19:28


Consult to Dietitian/Nutrition [CONS] Routine 


   Physician Instructions: Assess nutrtn needs, initiate, modify, manage TF


   Reason For Exam: 


   Reason for Consult: Write/Manage Tube Feeding


   Reason for Consult: Write/Manage Tube Feeding





11/26/19 08:50


Consult to PICC Line RN [CONS] Routine 


   Reason For Exam: POOR ACCESS


   Type Line:: Midline





11/30/19


Consult to Cardiac Rehabilitation [CONS] Routine 


   Reason For Exam: Phase I





12/02/19 08:30


Consult to Mental Health [CONS] Routine 


   Reason For Exam: mental health


   Place consult to:: suicidal ideation


   Notified:: Moe Lema


   Phone number called:: [phone]


   Was contact made?: Yes


   If yes, spoke with:: Moe Lema


   Time called:: 10:20





12/02/19 14:56


Consult to Mental Health [CONS] Routine 


   Reason For Exam: suicidal ideation


   Place consult to:: mental health


   Notified:: Yeni


   Phone number called:: [phone]


   Was contact made?: Yes


   If yes, spoke with:: Yeni





12/04/19 09:11


Consult Geriatric-Psych [CONS] Routine 


   Consulting Provider: 


   Reason For Exam: eval/pt. is a 1013











Primary care physician: 


PRIMARY CARE MD








Hospitalization


Reason for admission: Acute Respiratory failure with hypoxia on mechanical 

ventilation


Condition: Stable


Pertinent studies: 





Cervical spine CT:


Soft tissue fullness in the prevertebral and retropharyngeal region


Procedures: 





Endotracheal intubation


Hospital course: 





Final discharge diagnosis:


Acute Chest pain with resultant Syncope. Exact cause unknown


Acute severe COPD exacerbation


Acute Respiratory failure with hypoxia on mechanical ventilation for >48 hrs, 

now extubated


?Pharyngeal swelling


Cocaine and tobacco abuse


Leukocytosis, likely reactive vs solumedrol induced. 


Suicidal ideation by falling from a height


Acute metabolic Encephalopathy


Hypophosphatemia


Severe Protein calorie Malnutrition





Hospital course:


Patient was admitted to the ICU and continued on mechanical support.  In 

addition he was placed on COPD protocol.  He was also placed on 1013. 

Subsequently, his respiratory status improved and he was successfully extubated 

and later transferred to the acute medicine floor for continued management.  

While on the floor, he had an episode of chest pain with resultant syncope.  

Cardiology was consulted and no further investigative testing was recommended.  

Thereafter he was medically cleared.  Disposition was then determined by the 

psychiatry team.


Disposition: DC-01 TO HOME OR SELFCARE


Time spent for discharge: 38 mins





Core Measure Documentation





- Palliative Care


Palliative Care/ Comfort Measures: Not Applicable





- Core Measures


Any of the following diagnoses?: none





Exam





- Constitutional


Vitals: 


                                        











Temp Pulse Resp BP Pulse Ox


 


 99.0 F   98 H  20   112/54   97 


 


 12/04/19 13:37  12/04/19 13:37  12/04/19 13:37  12/04/19 13:37  12/04/19 13:37











General appearance: Present: no acute distress





- EENT


Eyes: Present: PERRL, EOM intact


ENT: hearing intact, clear oral mucosa





- Neck


Neck: Present: supple, normal ROM





- Respiratory


Respiratory effort: normal


Respiratory: bilateral: CTA





- Cardiovascular


Rhythm: regular


Heart Sounds: Present: S1 & S2.  Absent: rub, click





- Extremities


Extremities: No edema





- Abdominal


General gastrointestinal: Present: soft, non-tender, non-distended, normal bowel

 sounds





- Integumentary


Integumentary: Present: clear, warm, dry





- Musculoskeletal


Musculoskeletal: gait normal, strength equal bilaterally





- Psychiatric


Psychiatric: appropriate mood/affect, intact judgment & insight





- Neurologic


Neurologic: CNII-XII intact, moves all extremities





Plan


[facility]: 


[facility]


BRUNO PASTOR MD [Staff Physician] - 7 Days


PRIMARY CARE,MD [Primary Care Provider] - 3-5 Days


Prescriptions: 


Simvastatin 40 mg PO QHS #30 tablet


Aspirin [Adult Aspirin] 81 mg PO DAILY #30 tablet.dr


Prednisone [predniSONE 10 mg (6-Day Pack, 21 Tabs)] 10 mg PO .TAPER #1 tab.ds.pk

## 2022-07-16 ENCOUNTER — HOSPITAL ENCOUNTER (EMERGENCY)
Dept: HOSPITAL 5 - ED | Age: 52
Discharge: HOME | End: 2022-07-16
Payer: MEDICAID

## 2022-07-16 VITALS — DIASTOLIC BLOOD PRESSURE: 61 MMHG | SYSTOLIC BLOOD PRESSURE: 109 MMHG

## 2022-07-16 DIAGNOSIS — J45.909: ICD-10-CM
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
## 2022-07-16 DIAGNOSIS — T50.901A: Primary | ICD-10-CM

## 2022-07-16 DIAGNOSIS — F19.10: ICD-10-CM

## 2022-07-16 LAB
BASOPHILS # (AUTO): 0 K/MM3 (ref 0–0.1)
BASOPHILS NFR BLD AUTO: 0.8 % (ref 0–1.8)
BUN SERPL-MCNC: 11 MG/DL (ref 9–20)
BUN/CREAT SERPL: 11 %
CALCIUM SERPL-MCNC: 10.1 MG/DL (ref 8.4–10.2)
EOSINOPHIL # BLD AUTO: 0.4 K/MM3 (ref 0–0.4)
EOSINOPHIL NFR BLD AUTO: 7.2 % (ref 0–4.3)
HCT VFR BLD CALC: 41.5 % (ref 35.5–45.6)
HEMOLYSIS INDEX: 21
HGB BLD-MCNC: 13.8 GM/DL (ref 11.8–15.2)
LYMPHOCYTES # BLD AUTO: 2.5 K/MM3 (ref 1.2–5.4)
LYMPHOCYTES NFR BLD AUTO: 40.3 % (ref 13.4–35)
MCHC RBC AUTO-ENTMCNC: 33 % (ref 32–34)
MCV RBC AUTO: 97 FL (ref 84–94)
MONOCYTES # (AUTO): 0.7 K/MM3 (ref 0–0.8)
MONOCYTES % (AUTO): 11.9 % (ref 0–7.3)
PLATELET # BLD: 301 K/MM3 (ref 140–440)
RBC # BLD AUTO: 4.28 M/MM3 (ref 3.65–5.03)

## 2022-07-16 PROCEDURE — 96375 TX/PRO/DX INJ NEW DRUG ADDON: CPT

## 2022-07-16 PROCEDURE — G0480 DRUG TEST DEF 1-7 CLASSES: HCPCS

## 2022-07-16 PROCEDURE — 82553 CREATINE MB FRACTION: CPT

## 2022-07-16 PROCEDURE — 84484 ASSAY OF TROPONIN QUANT: CPT

## 2022-07-16 PROCEDURE — 96374 THER/PROPH/DIAG INJ IV PUSH: CPT

## 2022-07-16 PROCEDURE — 80048 BASIC METABOLIC PNL TOTAL CA: CPT

## 2022-07-16 PROCEDURE — 36415 COLL VENOUS BLD VENIPUNCTURE: CPT

## 2022-07-16 PROCEDURE — 82550 ASSAY OF CK (CPK): CPT

## 2022-07-16 PROCEDURE — 99284 EMERGENCY DEPT VISIT MOD MDM: CPT

## 2022-07-16 PROCEDURE — 85025 COMPLETE CBC W/AUTO DIFF WBC: CPT

## 2022-07-16 PROCEDURE — 80320 DRUG SCREEN QUANTALCOHOLS: CPT

## 2022-07-16 PROCEDURE — 70450 CT HEAD/BRAIN W/O DYE: CPT
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
## 2022-07-16 NOTE — CAT SCAN REPORT
CT HEAD WITHOUT CONTRAST



INDICATION / CLINICAL INFORMATION: Altered mental status.



TECHNIQUE: CT head was performed without administration of intravenous contrast. All CT scans at this
 location are performed using CT dose reduction for ALARA by means of automated exposure control. 



COMPARISON: CT head 11/24/2019



FINDINGS:

CEREBRAL HEMISPHERES: There is no evidence of large territorial infarction or significant abnormality
 of gray-white matter differentiation. Ventricles within normal limits. No midline shift. Basal ciste
rns patent.

HEMORRHAGE: None.

CEREBELLUM / BRAINSTEM: No significant abnormality.

ORBITS: No significant abnormality.

SOFT TISSUES: No significant abnormality.

SKULL: No significant abnormality.

PARANASAL SINUSES / MASTOID AIR CELLS: Mild mucoperiosteal thickening of maxillary, sphenoid, ethmoid
, and frontal sinuses is demonstrated.

ADDITIONAL FINDINGS: None.





IMPRESSION:

1. No acute intracranial abnormality.



Signer Name: Jimi Stoddard II, MD 

Signed: 7/16/2022 5:01 AM

Workstation Name: [id number]